# Patient Record
Sex: MALE | Race: BLACK OR AFRICAN AMERICAN | Employment: OTHER | URBAN - METROPOLITAN AREA
[De-identification: names, ages, dates, MRNs, and addresses within clinical notes are randomized per-mention and may not be internally consistent; named-entity substitution may affect disease eponyms.]

---

## 2020-08-05 ENCOUNTER — APPOINTMENT (OUTPATIENT)
Dept: GENERAL RADIOLOGY | Age: 65
End: 2020-08-05
Payer: OTHER GOVERNMENT

## 2020-08-05 ENCOUNTER — HOSPITAL ENCOUNTER (EMERGENCY)
Age: 65
Discharge: HOME | End: 2020-08-05
Payer: OTHER GOVERNMENT

## 2020-08-05 VITALS
WEIGHT: 146 LBS | SYSTOLIC BLOOD PRESSURE: 140 MMHG | DIASTOLIC BLOOD PRESSURE: 107 MMHG | RESPIRATION RATE: 20 BRPM | OXYGEN SATURATION: 95 % | BODY MASS INDEX: 21.62 KG/M2 | HEIGHT: 69 IN | HEART RATE: 95 BPM | TEMPERATURE: 98.1 F

## 2020-08-05 PROCEDURE — 73552 X-RAY EXAM OF FEMUR 2/>: CPT

## 2020-08-05 PROCEDURE — 99283 EMERGENCY DEPT VISIT LOW MDM: CPT

## 2020-08-05 PROCEDURE — 73502 X-RAY EXAM HIP UNI 2-3 VIEWS: CPT

## 2020-08-05 PROCEDURE — 73590 X-RAY EXAM OF LOWER LEG: CPT

## 2020-08-05 PROCEDURE — 6370000000 HC RX 637 (ALT 250 FOR IP): Performed by: NURSE PRACTITIONER

## 2020-08-05 PROCEDURE — 99284 EMERGENCY DEPT VISIT MOD MDM: CPT

## 2020-08-05 RX ORDER — IBUPROFEN 800 MG/1
800 TABLET ORAL EVERY 8 HOURS PRN
Qty: 30 TABLET | Refills: 0 | Status: SHIPPED | OUTPATIENT
Start: 2020-08-05 | End: 2022-07-25

## 2020-08-05 RX ORDER — HYDROCODONE BITARTRATE AND ACETAMINOPHEN 5; 325 MG/1; MG/1
1 TABLET ORAL ONCE
Status: COMPLETED | OUTPATIENT
Start: 2020-08-05 | End: 2020-08-05

## 2020-08-05 RX ADMIN — HYDROCODONE BITARTRATE AND ACETAMINOPHEN 1 TABLET: 5; 325 TABLET ORAL at 17:31

## 2020-08-05 ASSESSMENT — PAIN DESCRIPTION - PAIN TYPE: TYPE: ACUTE PAIN

## 2020-08-05 ASSESSMENT — PAIN DESCRIPTION - ORIENTATION: ORIENTATION: LEFT

## 2020-08-05 ASSESSMENT — PAIN SCALES - GENERAL: PAINLEVEL_OUTOF10: 9

## 2020-08-05 ASSESSMENT — PAIN DESCRIPTION - LOCATION: LOCATION: LEG;HIP
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
## 2020-09-09 ENCOUNTER — APPOINTMENT (OUTPATIENT)
Dept: CT IMAGING | Age: 65
End: 2020-09-09
Payer: OTHER GOVERNMENT

## 2020-09-09 ENCOUNTER — APPOINTMENT (OUTPATIENT)
Dept: GENERAL RADIOLOGY | Age: 65
End: 2020-09-09
Payer: OTHER GOVERNMENT

## 2020-09-09 ENCOUNTER — HOSPITAL ENCOUNTER (EMERGENCY)
Age: 65
Discharge: HOME | End: 2020-09-09
Attending: EMERGENCY MEDICINE
Payer: OTHER GOVERNMENT

## 2020-09-09 VITALS
HEART RATE: 79 BPM | DIASTOLIC BLOOD PRESSURE: 91 MMHG | BODY MASS INDEX: 20.59 KG/M2 | OXYGEN SATURATION: 98 % | TEMPERATURE: 97.5 F | SYSTOLIC BLOOD PRESSURE: 148 MMHG | HEIGHT: 69 IN | WEIGHT: 139 LBS | RESPIRATION RATE: 18 BRPM

## 2020-09-09 LAB
ALBUMIN SERPL-MCNC: 4 G/DL (ref 3.5–5.2)
ALP BLD-CCNC: 69 U/L (ref 40–129)
ALT SERPL-CCNC: 10 U/L (ref 0–40)
ANION GAP SERPL CALCULATED.3IONS-SCNC: 12 MMOL/L (ref 7–16)
APTT: 30 SEC (ref 24.5–35.1)
AST SERPL-CCNC: 12 U/L (ref 0–39)
BACTERIA: NORMAL /HPF
BASOPHILS ABSOLUTE: 0.02 E9/L (ref 0–0.2)
BASOPHILS RELATIVE PERCENT: 0.4 % (ref 0–2)
BILIRUB SERPL-MCNC: 0.4 MG/DL (ref 0–1.2)
BILIRUBIN URINE: NEGATIVE
BLOOD, URINE: NEGATIVE
BUN BLDV-MCNC: 13 MG/DL (ref 8–23)
CALCIUM SERPL-MCNC: 9 MG/DL (ref 8.6–10.2)
CHLORIDE BLD-SCNC: 102 MMOL/L (ref 98–107)
CLARITY: CLEAR
CO2: 24 MMOL/L (ref 22–29)
COLOR: YELLOW
CREAT SERPL-MCNC: 1 MG/DL (ref 0.7–1.2)
EOSINOPHILS ABSOLUTE: 0.12 E9/L (ref 0.05–0.5)
EOSINOPHILS RELATIVE PERCENT: 2.4 % (ref 0–6)
GFR AFRICAN AMERICAN: >60
GFR NON-AFRICAN AMERICAN: >60 ML/MIN/1.73
GLUCOSE BLD-MCNC: 80 MG/DL (ref 74–99)
GLUCOSE URINE: NEGATIVE MG/DL
HCT VFR BLD CALC: 43 % (ref 37–54)
HEMOGLOBIN: 14.1 G/DL (ref 12.5–16.5)
IMMATURE GRANULOCYTES #: 0.02 E9/L
IMMATURE GRANULOCYTES %: 0.4 % (ref 0–5)
INR BLD: 0.9
KETONES, URINE: NEGATIVE MG/DL
LEUKOCYTE ESTERASE, URINE: ABNORMAL
LYMPHOCYTES ABSOLUTE: 1.85 E9/L (ref 1.5–4)
LYMPHOCYTES RELATIVE PERCENT: 36.6 % (ref 20–42)
MAGNESIUM: 2.3 MG/DL (ref 1.6–2.6)
MCH RBC QN AUTO: 27.6 PG (ref 26–35)
MCHC RBC AUTO-ENTMCNC: 32.8 % (ref 32–34.5)
MCV RBC AUTO: 84.1 FL (ref 80–99.9)
MONOCYTES ABSOLUTE: 0.54 E9/L (ref 0.1–0.95)
MONOCYTES RELATIVE PERCENT: 10.7 % (ref 2–12)
NEUTROPHILS ABSOLUTE: 2.5 E9/L (ref 1.8–7.3)
NEUTROPHILS RELATIVE PERCENT: 49.5 % (ref 43–80)
NITRITE, URINE: NEGATIVE
PDW BLD-RTO: 15.5 FL (ref 11.5–15)
PH UA: 6 (ref 5–9)
PLATELET # BLD: 335 E9/L (ref 130–450)
PMV BLD AUTO: 9.1 FL (ref 7–12)
POTASSIUM SERPL-SCNC: 4.2 MMOL/L (ref 3.5–5)
PROTEIN UA: NEGATIVE MG/DL
PROTHROMBIN TIME: 10.2 SEC (ref 9.3–12.4)
RBC # BLD: 5.11 E12/L (ref 3.8–5.8)
RBC UA: NORMAL /HPF (ref 0–2)
SODIUM BLD-SCNC: 138 MMOL/L (ref 132–146)
SPECIFIC GRAVITY UA: 1.01 (ref 1–1.03)
TOTAL PROTEIN: 6.7 G/DL (ref 6.4–8.3)
TROPONIN: <0.01 NG/ML (ref 0–0.03)
UROBILINOGEN, URINE: 0.2 E.U./DL
WBC # BLD: 5.1 E9/L (ref 4.5–11.5)
WBC UA: NORMAL /HPF (ref 0–5)

## 2020-09-09 PROCEDURE — 85610 PROTHROMBIN TIME: CPT

## 2020-09-09 PROCEDURE — 99283 EMERGENCY DEPT VISIT LOW MDM: CPT

## 2020-09-09 PROCEDURE — 83735 ASSAY OF MAGNESIUM: CPT

## 2020-09-09 PROCEDURE — 70450 CT HEAD/BRAIN W/O DYE: CPT

## 2020-09-09 PROCEDURE — 71045 X-RAY EXAM CHEST 1 VIEW: CPT

## 2020-09-09 PROCEDURE — 6360000002 HC RX W HCPCS: Performed by: EMERGENCY MEDICINE

## 2020-09-09 PROCEDURE — 85730 THROMBOPLASTIN TIME PARTIAL: CPT

## 2020-09-09 PROCEDURE — 85025 COMPLETE CBC W/AUTO DIFF WBC: CPT

## 2020-09-09 PROCEDURE — 84484 ASSAY OF TROPONIN QUANT: CPT

## 2020-09-09 PROCEDURE — 81001 URINALYSIS AUTO W/SCOPE: CPT

## 2020-09-09 PROCEDURE — 72125 CT NECK SPINE W/O DYE: CPT

## 2020-09-09 PROCEDURE — 36415 COLL VENOUS BLD VENIPUNCTURE: CPT

## 2020-09-09 PROCEDURE — 80053 COMPREHEN METABOLIC PANEL: CPT

## 2020-09-09 PROCEDURE — 99284 EMERGENCY DEPT VISIT MOD MDM: CPT

## 2020-09-09 PROCEDURE — 96374 THER/PROPH/DIAG INJ IV PUSH: CPT

## 2020-09-09 PROCEDURE — 93005 ELECTROCARDIOGRAM TRACING: CPT | Performed by: EMERGENCY MEDICINE

## 2020-09-09 RX ORDER — DEXAMETHASONE SODIUM PHOSPHATE 10 MG/ML
10 INJECTION, SOLUTION INTRAMUSCULAR; INTRAVENOUS ONCE
Status: COMPLETED | OUTPATIENT
Start: 2020-09-09 | End: 2020-09-09

## 2020-09-09 RX ORDER — NAPROXEN 500 MG/1
500 TABLET ORAL 2 TIMES DAILY PRN
Qty: 30 TABLET | Refills: 0 | Status: SHIPPED | OUTPATIENT
Start: 2020-09-09 | End: 2022-05-10

## 2020-09-09 RX ADMIN — DEXAMETHASONE SODIUM PHOSPHATE 10 MG: 10 INJECTION, SOLUTION INTRAMUSCULAR; INTRAVENOUS at 15:55

## 2020-09-09 NOTE — ED PROVIDER NOTES
HPI:  9/9/20,   Time: 10:20 AM EDT       Wilber Koyanagi is a 76 y.o. male presenting to the ED for fall/vanessa arm numbness, beginning 4 day ago. The complaint has been persistent, moderate in severity, and worsened by nothing. [facility], [facility] for concern for cva. Fell 4 days ago, persistant ha, no thinners. No weakness, no visual changes, no hx same. No incontinence, no saddle anesthesia, no fever    Review of Systems:   Pertinent positives and negatives are stated within HPI, all other systems reviewed and are negative.          --------------------------------------------- PAST HISTORY ---------------------------------------------  Past Medical History:  has a past medical history of Cancer ([facility] [address] Past Surgical History:  has a past surgical history that includes Skin graft and Hemorrhoid surgery. Social History:  reports that he has been smoking. He has never used smokeless tobacco. He reports that he does not drink alcohol or use drugs. Family History: family history is not on file. The patients home medications have been reviewed. Allergies: Patient has no known allergies. ---------------------------------------------------PHYSICAL EXAM--------------------------------------    Constitutional/General: Alert and oriented x3, well appearing, non toxic in NAD  Head: Normocephalic and atraumatic  Eyes: PERRL, EOMI, conjunctive normal, sclera non icteric  Mouth: Oropharynx clear, handling secretions, no trismus, no asymmetry of the posterior oropharynx or uvular edema  Neck: Supple, full ROM, non tender to palpation in the midline, no stridor, no crepitus, no meningeal signs  Respiratory: Lungs clear to auscultation bilaterally, no wheezes, rales, or rhonchi. Not in respiratory distress  Cardiovascular:  Regular rate. Regular rhythm. No murmurs, gallops, or rubs. 2+ distal pulses  Chest: No chest wall tenderness  GI:  Abdomen Soft, Non tender, Non distended. +BS.  No organomegaly, no palpable masses,  No rebound, guarding, or rigidity. Musculoskeletal: Moves all extremities x 4. Warm and well perfused, no clubbing, cyanosis, or edema. Capillary refill <3 seconds  Integument: skin warm and dry. No rashes. Lymphatic: no lymphadenopathy noted  Neurologic: GCS 15, no focal deficits, symmetric strength 5/5 in the upper and lower extremities bilaterally  Psychiatric: Normal Affect    -------------------------------------------------- RESULTS -------------------------------------------------  I have personally reviewed all laboratory and imaging results for this patient. Results are listed below.      LABS:  Results for orders placed or performed during the hospital encounter of 09/09/20   CBC Auto Differential   Result Value Ref Range    WBC 5.1 4.5 - 11.5 E9/L    RBC 5.11 3.80 - 5.80 E12/L    Hemoglobin 14.1 12.5 - 16.5 g/dL    Hematocrit 43.0 37.0 - 54.0 %    MCV 84.1 80.0 - 99.9 fL    MCH 27.6 26.0 - 35.0 pg    MCHC 32.8 32.0 - 34.5 %    RDW 15.5 (H) 11.5 - 15.0 fL    Platelets 679 114 - 950 E9/L    MPV 9.1 7.0 - 12.0 fL    Neutrophils % 49.5 43.0 - 80.0 %    Immature Granulocytes % 0.4 0.0 - 5.0 %    Lymphocytes % 36.6 20.0 - 42.0 %    Monocytes % 10.7 2.0 - 12.0 %    Eosinophils % 2.4 0.0 - 6.0 %    Basophils % 0.4 0.0 - 2.0 %    Neutrophils Absolute 2.50 1.80 - 7.30 E9/L    Immature Granulocytes # 0.02 E9/L    Lymphocytes Absolute 1.85 1.50 - 4.00 E9/L    Monocytes Absolute 0.54 0.10 - 0.95 E9/L    Eosinophils Absolute 0.12 0.05 - 0.50 E9/L    Basophils Absolute 0.02 0.00 - 0.20 E9/L   Troponin   Result Value Ref Range    Troponin <0.01 0.00 - 0.03 ng/mL   Protime-INR   Result Value Ref Range    Protime 10.2 9.3 - 12.4 sec    INR 0.9    APTT   Result Value Ref Range    aPTT 30.0 24.5 - 35.1 sec   Magnesium   Result Value Ref Range    Magnesium 2.3 1.6 - 2.6 mg/dL   COMPREHENSIVE METABOLIC PANEL   Result Value Ref Range    Sodium 138 132 - 146 mmol/L    Potassium 4.2 3.5 - 5.0 mmol/L    Chloride 102 98 - 107 mmol/L    CO2 24 22 - 29 mmol/L    Anion Gap 12 7 - 16 mmol/L    Glucose 80 74 - 99 mg/dL    BUN 13 8 - 23 mg/dL    CREATININE 1.0 0.7 - 1.2 mg/dL    GFR Non-African American >60 >=60 mL/min/1.73    GFR African American >60     Calcium 9.0 8.6 - 10.2 mg/dL    Total Protein 6.7 6.4 - 8.3 g/dL    Alb 4.0 3.5 - 5.2 g/dL    Total Bilirubin 0.4 0.0 - 1.2 mg/dL    Alkaline Phosphatase 69 40 - 129 U/L    ALT 10 0 - 40 U/L    AST 12 0 - 39 U/L   Urinalysis, reflex to microscopic   Result Value Ref Range    Color, UA Yellow Straw/Yellow    Clarity, UA Clear Clear    Glucose, Ur Negative Negative mg/dL    Bilirubin Urine Negative Negative    Ketones, Urine Negative Negative mg/dL    Specific Gravity, UA 1.015 1.005 - 1.030    Blood, Urine Negative Negative    pH, UA 6.0 5.0 - 9.0    Protein, UA Negative Negative mg/dL    Urobilinogen, Urine 0.2 <2.0 E.U./dL    Nitrite, Urine Negative Negative    Leukocyte Esterase, Urine TRACE (A) Negative   Microscopic Urinalysis   Result Value Ref Range    WBC, UA NONE 0 - 5 /HPF    RBC, UA NONE 0 - 2 /HPF    Bacteria, UA NONE SEEN None Seen /HPF   EKG 12 Lead   Result Value Ref Range    Ventricular Rate 56 BPM    Atrial Rate 56 BPM    P-R Interval 140 ms    QRS Duration 88 ms    Q-T Interval 432 ms    QTc Calculation (Bazett) 416 ms    P Axis 60 degrees    R Axis 94 degrees    T Axis 8 degrees       RADIOLOGY:  Interpreted by Radiologist.  CT Head WO Contrast   Final Result      1. CT brain: Extensive white matter density changes suggesting chronic   microvascular ischemic disease. Likely old lacune or infarct on the   right. 2. CT cervical spine: No acute fracture or dislocation. Degenerative   spondylotic changes. C4-5: Large broad-based disc protrusion crossing the midline   asymmetrically extending to the left associated with very severe canal   encroachment. C3-4: Diffuse disc bulging with severe canal encroachment.    Moderate canal encroachment at C5-C6 and C7-T1. See above. CT Cervical Spine WO Contrast   Final Result      1. CT brain: Extensive white matter density changes suggesting chronic   microvascular ischemic disease. Likely old lacune or infarct on the   right. 2. CT cervical spine: No acute fracture or dislocation. Degenerative   spondylotic changes. C4-5: Large broad-based disc protrusion crossing the midline   asymmetrically extending to the left associated with very severe canal   encroachment. C3-4: Diffuse disc bulging with severe canal encroachment. Moderate canal encroachment at C5-C6 and C7-T1. See above. XR CHEST PORTABLE   Final Result   tortuous ectatic aorta   Airspace disease compatible with atelectasis, at the left lung base                         EKG:  This EKG is signed and interpreted by the EP. Time: 1049  Rate: 55  Rhythm: Sinus  Interpretation: sinus bradycardia  Comparison: None      ------------------------- NURSING NOTES AND VITALS REVIEWED ---------------------------   The nursing notes within the ED encounter and vital signs as below have been reviewed by myself. BP (!) 148/91   Pulse 79   Temp 97.5 °F (36.4 °C) (Oral)   Resp 18   Ht 5' 9\" (1.753 m)   Wt 139 lb (63 kg)   SpO2 98%   BMI 20.53 kg/m²   Oxygen Saturation Interpretation: Normal    The patients available past medical records and past encounters were reviewed. ------------------------------ ED COURSE/MEDICAL DECISION MAKING----------------------  Medications   dexamethasone (PF) (DECADRON) injection 10 mg (10 mg Intravenous Given 9/9/20 1555)         ED COURSE:       Medical Decision Making:    Pt neuro intact, results noted, stenosis on ct, that is cause of vanessa paresthesias likely. Sx vanessa, so not cva, feel can dc with pcp fu.   Pt agreeable with poc      This patient's ED course included: a personal history and physicial examination    This patient has remained hemodynamically stable during their ED

## 2020-09-10 LAB
EKG ATRIAL RATE: 56 BPM
EKG P AXIS: 60 DEGREES
EKG P-R INTERVAL: 140 MS
EKG Q-T INTERVAL: 432 MS
EKG QRS DURATION: 88 MS
EKG QTC CALCULATION (BAZETT): 416 MS
EKG R AXIS: 94 DEGREES
EKG T AXIS: 8 DEGREES
EKG VENTRICULAR RATE: 56 BPM

## 2020-09-10 PROCEDURE — 93010 ELECTROCARDIOGRAM REPORT: CPT | Performed by: INTERNAL MEDICINE

## 2021-11-10 LAB
ALBUMIN SERPL-MCNC: 4.1 G/DL (ref 3.5–5.2)
ALP BLD-CCNC: 71 U/L (ref 40–129)
ALT SERPL-CCNC: 10 U/L (ref 0–40)
ANION GAP SERPL CALCULATED.3IONS-SCNC: 11 MMOL/L (ref 7–16)
AST SERPL-CCNC: 14 U/L (ref 0–39)
BACTERIA: NORMAL /HPF
BASOPHILS ABSOLUTE: 0.02 E9/L (ref 0–0.2)
BASOPHILS RELATIVE PERCENT: 0.4 % (ref 0–2)
BILIRUB SERPL-MCNC: 0.6 MG/DL (ref 0–1.2)
BILIRUBIN URINE: NEGATIVE
BLOOD, URINE: NEGATIVE
BUN BLDV-MCNC: 21 MG/DL (ref 6–23)
CALCIUM SERPL-MCNC: 9.3 MG/DL (ref 8.6–10.2)
CHLORIDE BLD-SCNC: 106 MMOL/L (ref 98–107)
CLARITY: CLEAR
CO2: 22 MMOL/L (ref 22–29)
COLOR: YELLOW
CREAT SERPL-MCNC: 1.1 MG/DL (ref 0.7–1.2)
EOSINOPHILS ABSOLUTE: 0.1 E9/L (ref 0.05–0.5)
EOSINOPHILS RELATIVE PERCENT: 2 % (ref 0–6)
GFR AFRICAN AMERICAN: >60
GFR NON-AFRICAN AMERICAN: >60 ML/MIN/1.73
GLUCOSE BLD-MCNC: 94 MG/DL (ref 74–99)
GLUCOSE URINE: NEGATIVE MG/DL
HCT VFR BLD CALC: 42.5 % (ref 37–54)
HEMOGLOBIN: 14.1 G/DL (ref 12.5–16.5)
IMMATURE GRANULOCYTES #: 0.02 E9/L
IMMATURE GRANULOCYTES %: 0.4 % (ref 0–5)
KETONES, URINE: NEGATIVE MG/DL
LACTIC ACID, SEPSIS: 0.7 MMOL/L (ref 0.5–1.9)
LEUKOCYTE ESTERASE, URINE: NEGATIVE
LYMPHOCYTES ABSOLUTE: 1.48 E9/L (ref 1.5–4)
LYMPHOCYTES RELATIVE PERCENT: 29.6 % (ref 20–42)
MCH RBC QN AUTO: 27.5 PG (ref 26–35)
MCHC RBC AUTO-ENTMCNC: 33.2 % (ref 32–34.5)
MCV RBC AUTO: 83 FL (ref 80–99.9)
MONOCYTES ABSOLUTE: 0.49 E9/L (ref 0.1–0.95)
MONOCYTES RELATIVE PERCENT: 9.8 % (ref 2–12)
NEUTROPHILS ABSOLUTE: 2.89 E9/L (ref 1.8–7.3)
NEUTROPHILS RELATIVE PERCENT: 57.8 % (ref 43–80)
NITRITE, URINE: NEGATIVE
PDW BLD-RTO: 16 FL (ref 11.5–15)
PH UA: 7 (ref 5–9)
PLATELET # BLD: 297 E9/L (ref 130–450)
PMV BLD AUTO: 9.1 FL (ref 7–12)
POTASSIUM SERPL-SCNC: 4.6 MMOL/L (ref 3.5–5)
PROTEIN UA: NEGATIVE MG/DL
RBC # BLD: 5.12 E12/L (ref 3.8–5.8)
RBC UA: NORMAL /HPF (ref 0–2)
SODIUM BLD-SCNC: 139 MMOL/L (ref 132–146)
SPECIFIC GRAVITY UA: 1.02 (ref 1–1.03)
TOTAL PROTEIN: 6.7 G/DL (ref 6.4–8.3)
UROBILINOGEN, URINE: 1 E.U./DL
WBC # BLD: 5 E9/L (ref 4.5–11.5)
WBC UA: NORMAL /HPF (ref 0–5)

## 2021-11-10 PROCEDURE — 85025 COMPLETE CBC W/AUTO DIFF WBC: CPT

## 2021-11-10 PROCEDURE — 99283 EMERGENCY DEPT VISIT LOW MDM: CPT

## 2021-11-10 PROCEDURE — 83605 ASSAY OF LACTIC ACID: CPT

## 2021-11-10 PROCEDURE — 84484 ASSAY OF TROPONIN QUANT: CPT

## 2021-11-10 PROCEDURE — 83880 ASSAY OF NATRIURETIC PEPTIDE: CPT

## 2021-11-10 PROCEDURE — 81001 URINALYSIS AUTO W/SCOPE: CPT

## 2021-11-10 PROCEDURE — 36415 COLL VENOUS BLD VENIPUNCTURE: CPT

## 2021-11-10 PROCEDURE — 80053 COMPREHEN METABOLIC PANEL: CPT

## 2021-11-10 ASSESSMENT — PAIN SCALES - GENERAL: PAINLEVEL_OUTOF10: 7

## 2021-11-10 NOTE — ED TRIAGE NOTES
FIRST PROVIDER CONTACT ASSESSMENT NOTE           Department of Emergency Medicine                 First Provider Note            11/10/21  6:09 PM EST    Date of Encounter: No admission date for patient encounter. Patient Name: Jenn Avendaño  : 1955  MRN: [id number]    Chief Complaint: Abdominal Pain (feels like something swollen in lower middle abdomen. \"feels like an alien inside of him\"  making it hard to walk)      History of Present Illness:   Jenn Avendaño is a 78 y.o. male who presents to the ED for llq pain , feels swollen    Focused Physical Exam:  VS:    ED Triage Vitals   BP Temp Temp Source Pulse Resp SpO2 Height Weight   11/10/21 1411 11/10/21 1401 11/10/21 1401 11/10/21 1401 -- 11/10/21 1401 -- 11/10/21 1411   (!) 145/111 98.7 °F (37.1 °C) Oral 83  95 %  130 lb (59 kg)        Physical Ex: Constitutional: Alert and non-toxic. Medical History:  has a past medical history of Cancer ([place] [address] Surgical History:  has a past surgical history that includes Skin graft and Hemorrhoid surgery. Social History:  reports that he has been smoking. He has never used smokeless tobacco. He reports that he does not drink alcohol and does not use drugs. Family History: family history is not on file. Allergies: Patient has no known allergies.      Initial Plan of Care: Initiate Treatment-Testing, Proceed toTreatment Area When Bed Available for ED Attending/MLP to Continue Care      ---END OF FIRST PROVIDER CONTACT ASSESSMENT NOTE---  Electronically signed by REBECCA Yu CNP   DD: 11/10/21

## 2021-11-11 ENCOUNTER — HOSPITAL ENCOUNTER (EMERGENCY)
Age: 66
Discharge: HOME | End: 2021-11-11
Attending: EMERGENCY MEDICINE

## 2021-11-11 ENCOUNTER — APPOINTMENT (OUTPATIENT)
Dept: GENERAL RADIOLOGY | Age: 66
End: 2021-11-11

## 2021-11-11 ENCOUNTER — APPOINTMENT (OUTPATIENT)
Dept: CT IMAGING | Age: 66
End: 2021-11-11

## 2021-11-11 VITALS
TEMPERATURE: 98.1 F | RESPIRATION RATE: 20 BRPM | HEART RATE: 75 BPM | SYSTOLIC BLOOD PRESSURE: 138 MMHG | BODY MASS INDEX: 19.2 KG/M2 | DIASTOLIC BLOOD PRESSURE: 80 MMHG | WEIGHT: 130 LBS | OXYGEN SATURATION: 98 %

## 2021-11-11 DIAGNOSIS — K59.00 CONSTIPATION, UNSPECIFIED CONSTIPATION TYPE: ICD-10-CM

## 2021-11-11 DIAGNOSIS — N40.0 ENLARGED PROSTATE: ICD-10-CM

## 2021-11-11 DIAGNOSIS — R10.9 ABDOMINAL PAIN, UNSPECIFIED ABDOMINAL LOCATION: Primary | ICD-10-CM

## 2021-11-11 LAB
EKG ATRIAL RATE: 61 BPM
EKG P AXIS: 63 DEGREES
EKG P-R INTERVAL: 142 MS
EKG Q-T INTERVAL: 432 MS
EKG QRS DURATION: 76 MS
EKG QTC CALCULATION (BAZETT): 434 MS
EKG R AXIS: 69 DEGREES
EKG T AXIS: 54 DEGREES
EKG VENTRICULAR RATE: 61 BPM
PRO-BNP: 118 PG/ML (ref 0–125)
TROPONIN, HIGH SENSITIVITY: 10 NG/L (ref 0–11)

## 2021-11-11 PROCEDURE — 74177 CT ABD & PELVIS W/CONTRAST: CPT

## 2021-11-11 PROCEDURE — 71045 X-RAY EXAM CHEST 1 VIEW: CPT

## 2021-11-11 PROCEDURE — 6360000004 HC RX CONTRAST MEDICATION: Performed by: RADIOLOGY

## 2021-11-11 PROCEDURE — 2580000003 HC RX 258: Performed by: EMERGENCY MEDICINE

## 2021-11-11 PROCEDURE — 93010 ELECTROCARDIOGRAM REPORT: CPT | Performed by: INTERNAL MEDICINE

## 2021-11-11 PROCEDURE — 93005 ELECTROCARDIOGRAM TRACING: CPT | Performed by: EMERGENCY MEDICINE

## 2021-11-11 PROCEDURE — 6370000000 HC RX 637 (ALT 250 FOR IP): Performed by: EMERGENCY MEDICINE

## 2021-11-11 RX ORDER — SODIUM CHLORIDE 9 MG/ML
INJECTION, SOLUTION INTRAVENOUS CONTINUOUS
Status: DISCONTINUED | OUTPATIENT
Start: 2021-11-11 | End: 2021-11-11 | Stop reason: HOSPADM

## 2021-11-11 RX ORDER — DOCUSATE SODIUM 100 MG/1
100 CAPSULE, LIQUID FILLED ORAL 2 TIMES DAILY PRN
Qty: 10 CAPSULE | Refills: 0 | Status: SHIPPED | OUTPATIENT
Start: 2021-11-11 | End: 2021-11-16

## 2021-11-11 RX ADMIN — MAGNESIUM CITRATE 296 ML: 1.75 LIQUID ORAL at 05:30

## 2021-11-11 RX ADMIN — IOPAMIDOL 90 ML: 755 INJECTION, SOLUTION INTRAVENOUS at 02:45

## 2021-11-11 RX ADMIN — SODIUM CHLORIDE: 9 INJECTION, SOLUTION INTRAVENOUS at 00:00

## 2021-11-11 NOTE — ED PROVIDER NOTES
HPI:  11/11/21, Time: 12:26 AM KATHLEEN Dunham is a 78 y.o. male presenting to the ED for abdominal pain, beginning months ago. The complaint has been persistent, moderate in severity, and worsened by nothing. Patient reporting that abdomen has been feeling swollen for last several months. Patient reporting no chest pain he does report some mild shortness of breath with exertion. Patient reporting no black or tarry stools. Patient reporting no fever chills or productive cough. Patient reporting feelings swelling in his lower abdomen. Patient does report difficulty urinating at times. Patient reporting no black or tarry stools. He reports no headache. ROS:   Pertinent positives and negatives are stated within HPI, all other systems reviewed and are negative.  --------------------------------------------- PAST HISTORY ---------------------------------------------  Past Medical History:  has a past medical history of Cancer ([facility] [address] Past Surgical History:  has a past surgical history that includes Skin graft and Hemorrhoid surgery. Social History:  reports that he has been smoking. He has never used smokeless tobacco. He reports that he does not drink alcohol and does not use drugs. Family History: family history is not on file. The patients home medications have been reviewed. Allergies: Patient has no known allergies. ---------------------------------------------------PHYSICAL EXAM--------------------------------------    Constitutional/General: Alert and oriented x3, well appearing, non toxic in NAD  Head: Normocephalic and atraumatic  Eyes: PERRL, EOMI  Mouth: Oropharynx clear, handling secretions, no trismus  Neck: Supple, full ROM, non tender to palpation in the midline, no stridor, no crepitus, no meningeal signs  Pulmonary: Lungs clear to auscultation bilaterally, no wheezes, rales, or rhonchi. Not in respiratory distress  Cardiovascular:  Regular rate.  Regular rhythm. No murmurs, gallops, or rubs. 2+ distal pulses  Chest: no chest wall tenderness  Abdomen: Soft. Tender left lower abdomen and suprapubic. Non distended. +BS. No rebound, guarding, or rigidity. No pulsatile masses appreciated. Musculoskeletal: Moves all extremities x 4. Warm and well perfused, no clubbing, cyanosis, or edema. Capillary refill <3 seconds  Skin: warm and dry. No rashes. Neurologic: GCS 15, CN 2-12 grossly intact, no focal deficits, symmetric strength 5/5 in the upper and lower extremities bilaterally  Psych: Normal Affect    -------------------------------------------------- RESULTS -------------------------------------------------  I have personally reviewed all laboratory and imaging results for this patient. Results are listed below.      LABS:  Results for orders placed or performed during the hospital encounter of 11/11/21   CBC auto differential   Result Value Ref Range    WBC 5.0 4.5 - 11.5 E9/L    RBC 5.12 3.80 - 5.80 E12/L    Hemoglobin 14.1 12.5 - 16.5 g/dL    Hematocrit 42.5 37.0 - 54.0 %    MCV 83.0 80.0 - 99.9 fL    MCH 27.5 26.0 - 35.0 pg    MCHC 33.2 32.0 - 34.5 %    RDW 16.0 (H) 11.5 - 15.0 fL    Platelets 873 746 - 713 E9/L    MPV 9.1 7.0 - 12.0 fL    Neutrophils % 57.8 43.0 - 80.0 %    Immature Granulocytes % 0.4 0.0 - 5.0 %    Lymphocytes % 29.6 20.0 - 42.0 %    Monocytes % 9.8 2.0 - 12.0 %    Eosinophils % 2.0 0.0 - 6.0 %    Basophils % 0.4 0.0 - 2.0 %    Neutrophils Absolute 2.89 1.80 - 7.30 E9/L    Immature Granulocytes # 0.02 E9/L    Lymphocytes Absolute 1.48 (L) 1.50 - 4.00 E9/L    Monocytes Absolute 0.49 0.10 - 0.95 E9/L    Eosinophils Absolute 0.10 0.05 - 0.50 E9/L    Basophils Absolute 0.02 0.00 - 0.20 E9/L   Comprehensive Metabolic Panel   Result Value Ref Range    Sodium 139 132 - 146 mmol/L    Potassium 4.6 3.5 - 5.0 mmol/L    Chloride 106 98 - 107 mmol/L    CO2 22 22 - 29 mmol/L    Anion Gap 11 7 - 16 mmol/L    Glucose 94 74 - 99 mg/dL    BUN 21 6 - 23 mg/dL CREATININE 1.1 0.7 - 1.2 mg/dL    GFR Non-African American >60 >=60 mL/min/1.73    GFR African American >60     Calcium 9.3 8.6 - 10.2 mg/dL    Total Protein 6.7 6.4 - 8.3 g/dL    Albumin 4.1 3.5 - 5.2 g/dL    Total Bilirubin 0.6 0.0 - 1.2 mg/dL    Alkaline Phosphatase 71 40 - 129 U/L    ALT 10 0 - 40 U/L    AST 14 0 - 39 U/L   Lactate, Sepsis   Result Value Ref Range    Lactic Acid, Sepsis 0.7 0.5 - 1.9 mmol/L   Urinalysis with Microscopic   Result Value Ref Range    Color, UA Yellow Straw/Yellow    Clarity, UA Clear Clear    Glucose, Ur Negative Negative mg/dL    Bilirubin Urine Negative Negative    Ketones, Urine Negative Negative mg/dL    Specific Gravity, UA 1.020 1.005 - 1.030    Blood, Urine Negative Negative    pH, UA 7.0 5.0 - 9.0    Protein, UA Negative Negative mg/dL    Urobilinogen, Urine 1.0 <2.0 E.U./dL    Nitrite, Urine Negative Negative    Leukocyte Esterase, Urine Negative Negative    WBC, UA NONE 0 - 5 /HPF    RBC, UA NONE 0 - 2 /HPF    Bacteria, UA NONE SEEN None Seen /HPF   Troponin   Result Value Ref Range    Troponin, High Sensitivity 10 0 - 11 ng/L   Brain Natriuretic Peptide   Result Value Ref Range    Pro- 0 - 125 pg/mL       RADIOLOGY:  Interpreted by Radiologist.  CT ABDOMEN PELVIS W IV CONTRAST Additional Contrast? None   Final Result   Moderate stool burden. Mild fluid distention of a few small bowel loops which may be incidental or   due to mild enteritis. Prostatomegaly. Evaluation is somewhat limited by lack of intra-abdominal fat and patient   motion but allowing for this, no other acute process identified. Short-term   follow-up recommended if symptoms persist.         XR CHEST PORTABLE   Final Result   Chronic appearing findings. PA and lateral views would be useful for further   assessment, if symptoms persist.               EKG:  This EKG is signed and interpreted by me.     Rate: 61  Rhythm: Sinus  Interpretation: non-specific EKG  Comparison: stable as patient and discussed todays results, in addition to providing specific details for the plan of care and counseling regarding the diagnosis and prognosis. Questions are answered at this time and they are agreeable with the plan.       --------------------------------- IMPRESSION AND DISPOSITION ---------------------------------    IMPRESSION  1. Abdominal pain, unspecified abdominal location    2. Enlarged prostate    3. Constipation, unspecified constipation type        DISPOSITION  Disposition: Discharge home  Patient condition is stable        NOTE: This report was transcribed using voice recognition software.  Every effort was made to ensure accuracy; however, inadvertent computerized transcription errors may be present          Geovani Rod MD  11/11/21 2067       Geovani Rod MD  11/11/21 5780

## 2022-03-24 ENCOUNTER — OFFICE VISIT (OUTPATIENT)
Dept: NEUROLOGY | Age: 67
End: 2022-03-24
Payer: OTHER GOVERNMENT

## 2022-03-24 VITALS
TEMPERATURE: 98.1 F | DIASTOLIC BLOOD PRESSURE: 98 MMHG | HEIGHT: 69 IN | SYSTOLIC BLOOD PRESSURE: 158 MMHG | OXYGEN SATURATION: 96 % | WEIGHT: 136.5 LBS | BODY MASS INDEX: 20.22 KG/M2 | RESPIRATION RATE: 16 BRPM | HEART RATE: 82 BPM

## 2022-03-24 DIAGNOSIS — G95.20 SPINAL CORD COMPRESSION (HCC): Primary | ICD-10-CM

## 2022-03-24 PROCEDURE — 99205 OFFICE O/P NEW HI 60 MIN: CPT | Performed by: CLINICAL NURSE SPECIALIST
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
## 2022-03-29 ENCOUNTER — OFFICE VISIT (OUTPATIENT)
Dept: NEUROSURGERY | Age: 67
End: 2022-03-29
Payer: OTHER GOVERNMENT

## 2022-03-29 VITALS
SYSTOLIC BLOOD PRESSURE: 148 MMHG | TEMPERATURE: 98.3 F | DIASTOLIC BLOOD PRESSURE: 108 MMHG | OXYGEN SATURATION: 98 % | BODY MASS INDEX: 20.14 KG/M2 | WEIGHT: 136 LBS | HEIGHT: 69 IN | HEART RATE: 107 BPM | RESPIRATION RATE: 20 BRPM

## 2022-03-29 DIAGNOSIS — M47.12 OSTEOARTHRITIS OF CERVICAL SPINE WITH MYELOPATHY: Primary | ICD-10-CM

## 2022-03-29 PROCEDURE — 99203 OFFICE O/P NEW LOW 30 MIN: CPT | Performed by: PHYSICIAN ASSISTANT

## 2022-03-29 RX ORDER — TAMSULOSIN HYDROCHLORIDE 0.4 MG/1
CAPSULE ORAL
COMMUNITY
Start: 2021-09-30

## 2022-03-29 RX ORDER — PSEUDOEPHEDRINE HCL 30 MG
TABLET ORAL
COMMUNITY
Start: 2021-11-17

## 2022-03-29 RX ORDER — SILDENAFIL 100 MG/1
TABLET, FILM COATED ORAL
COMMUNITY
Start: 2021-06-25

## 2022-03-29 ASSESSMENT — ENCOUNTER SYMPTOMS
RESPIRATORY NEGATIVE: 1
ALLERGIC/IMMUNOLOGIC NEGATIVE: 1
GASTROINTESTINAL NEGATIVE: 1
EYES NEGATIVE: 1

## 2022-03-29 NOTE — PROGRESS NOTES
Patellar reflexes are 3+ on the right side and 3+ on the left side. Comments: +HOFFMANS    Decreased sensation to LT in both hands in glove dist    Bilateral hand intrinsics 2/5  Left tricep and deltoid 2/5   Psychiatric:         Mood and Affect: Mood normal.         Assessment:      78year old male with progressive neck pain, arm weakness/numbness, gait dysfunction consistent with myelopathy. Plan:      Cervical MRI ordered, await results.         RUBEN Nagy

## 2022-04-20 ENCOUNTER — HOSPITAL ENCOUNTER (OUTPATIENT)
Dept: MRI IMAGING | Age: 67
Discharge: HOME | End: 2022-04-22
Payer: OTHER GOVERNMENT

## 2022-04-20 DIAGNOSIS — G95.20 SPINAL CORD COMPRESSION (HCC): ICD-10-CM

## 2022-04-20 PROCEDURE — 72141 MRI NECK SPINE W/O DYE: CPT

## 2022-04-27 ENCOUNTER — TELEPHONE (OUTPATIENT)
Dept: NEUROSURGERY | Age: 67
End: 2022-04-27

## 2022-05-04 ENCOUNTER — OFFICE VISIT (OUTPATIENT)
Dept: NEUROSURGERY | Age: 67
End: 2022-05-04
Payer: OTHER GOVERNMENT

## 2022-05-04 VITALS
OXYGEN SATURATION: 99 % | BODY MASS INDEX: 19.26 KG/M2 | HEIGHT: 69 IN | TEMPERATURE: 98 F | SYSTOLIC BLOOD PRESSURE: 138 MMHG | WEIGHT: 130 LBS | HEART RATE: 100 BPM | RESPIRATION RATE: 18 BRPM | DIASTOLIC BLOOD PRESSURE: 88 MMHG

## 2022-05-04 DIAGNOSIS — M54.2 NECK PAIN: Primary | ICD-10-CM

## 2022-05-04 PROCEDURE — 99215 OFFICE O/P EST HI 40 MIN: CPT | Performed by: NEUROLOGICAL SURGERY

## 2022-05-04 ASSESSMENT — ENCOUNTER SYMPTOMS
EYES NEGATIVE: 1
RESPIRATORY NEGATIVE: 1
GASTROINTESTINAL NEGATIVE: 1
ALLERGIC/IMMUNOLOGIC NEGATIVE: 1

## 2022-05-04 NOTE — PROGRESS NOTES
Priscila Lucas (:  1955) is a 78 y.o. male,Established patient, here for evaluation of the following chief complaint(s):  Follow-up (review MRI - Pt states he is having bladder incontinence. )         ASSESSMENT/PLAN:  1. Neck pain  78year old male who presents with neck pain and myelopathy with hyperreflexia, gait instability and loss of dexterity. His MRI shows large herniated disk at C3-C4, C4-C5 and C5-C6 with stenosis from C3-C7. Due to his myelopathy, I am recommending a 2 staged procedure consisting of anterior cervical diskectomy and fusdion at C3-C4, C4-C5 and C5-C6 with posterior C3-C7 laminectomy and C3-T1 fusion    No follow-ups on file. Subjective   SUBJECTIVE/OBJECTIVE:  HPI  78year old male who presents with neck pain. Gait instability, loss of dexterity and right arm weakness. This has been going on for over 1 year but has gotten progressively worse over the last 3 months. He also complains of numbness, tingling and weakness in his legs bilaterally. He has tried NSAIDs and physical therapy. The neck pain is described as sharp and achy. It is rated as a 7/10    Review of Systems   Constitutional: Negative. HENT: Negative. Eyes: Negative. Respiratory: Negative. Cardiovascular: Negative. Gastrointestinal: Negative. Endocrine: Negative. Genitourinary: Negative. Musculoskeletal: Positive for arthralgias, neck pain and neck stiffness. Skin: Negative. Allergic/Immunologic: Negative. Neurological: Positive for weakness and numbness. Hematological: Negative. Psychiatric/Behavioral: Negative. Objective   Physical Exam  Vitals reviewed. Constitutional:       General: He is not in acute distress. Appearance: Normal appearance. He is normal weight. He is not ill-appearing, toxic-appearing or diaphoretic. HENT:      Head: Normocephalic and atraumatic. Nose: Nose normal.   Eyes:      General: No scleral icterus.         Right eye: No discharge. Left eye: No discharge. Extraocular Movements: Extraocular movements intact. Conjunctiva/sclera: Conjunctivae normal.      Pupils: Pupils are equal, round, and reactive to light. Abdominal:      General: Abdomen is flat. Musculoskeletal:         General: No swelling, tenderness, deformity or signs of injury. Normal range of motion. Right lower leg: No edema. Left lower leg: No edema. Skin:     General: Skin is warm and dry. Capillary Refill: Capillary refill takes less than 2 seconds. Coloration: Skin is not jaundiced or pale. Findings: No bruising, erythema, lesion or rash. Neurological:      Mental Status: He is alert and oriented to person, place, and time. Mental status is at baseline. GCS: GCS eye subscore is 4. GCS verbal subscore is 5. GCS motor subscore is 6. Cranial Nerves: Cranial nerves are intact. No cranial nerve deficit. Sensory: No sensory deficit. Motor: Weakness present. No tremor, atrophy, abnormal muscle tone, seizure activity or pronator drift. Coordination: Coordination is intact. Romberg sign negative. Coordination normal. Finger-Nose-Finger Test and Heel to [facility] Test normal.      Gait: Gait abnormal.      Deep Tendon Reflexes: Reflexes abnormal. Babinski sign absent on the right side. Babinski sign absent on the left side. Reflex Scores:       Tricep reflexes are 3+ on the right side and 3+ on the left side. Bicep reflexes are 3+ on the right side and 3+ on the left side. Brachioradialis reflexes are 3+ on the right side and 3+ on the left side. Patellar reflexes are 3+ on the right side and 3+ on the left side. Achilles reflexes are 3+ on the right side and 3+ on the left side. Comments: 4/5 in RUE  Positive Elizabeth's sign bilaterally   Psychiatric:         Mood and Affect: Mood normal.         Behavior: Behavior normal.         Thought Content:  Thought content normal.         Judgment: Judgment normal.            On this date 5/4/2022 I have spent 45 minutes reviewing previous notes, test results and face to face with the patient discussing the diagnosis and importance of compliance with the treatment plan as well as documenting on the day of the visit. An electronic signature was used to authenticate this note.     --Soy Lima MD

## 2022-05-06 ENCOUNTER — PREP FOR PROCEDURE (OUTPATIENT)
Dept: NEUROSURGERY | Age: 67
End: 2022-05-06

## 2022-05-06 DIAGNOSIS — Z01.818 PRE-OP TESTING: Primary | ICD-10-CM

## 2022-05-06 RX ORDER — SODIUM CHLORIDE 9 MG/ML
INJECTION, SOLUTION INTRAVENOUS PRN
Status: CANCELLED | OUTPATIENT
Start: 2022-05-06

## 2022-05-06 RX ORDER — SODIUM CHLORIDE 0.9 % (FLUSH) 0.9 %
5-40 SYRINGE (ML) INJECTION EVERY 12 HOURS SCHEDULED
Status: CANCELLED | OUTPATIENT
Start: 2022-05-06

## 2022-05-06 RX ORDER — SODIUM CHLORIDE 0.9 % (FLUSH) 0.9 %
5-40 SYRINGE (ML) INJECTION PRN
Status: CANCELLED | OUTPATIENT
Start: 2022-05-06

## 2022-05-06 RX ORDER — SODIUM CHLORIDE 9 MG/ML
INJECTION, SOLUTION INTRAVENOUS CONTINUOUS
Status: CANCELLED | OUTPATIENT
Start: 2022-05-06

## 2022-05-10 RX ORDER — MULTIVIT-MIN/IRON/FOLIC ACID/K 18-600-40
CAPSULE ORAL
COMMUNITY

## 2022-05-10 RX ORDER — AMLODIPINE BESYLATE 10 MG/1
10 TABLET ORAL DAILY
COMMUNITY

## 2022-05-10 NOTE — PROGRESS NOTES
[address]   PRE-ADMISSION TESTING GENERAL INSTRUCTIONS  PAT Phone Number: [phone]      GENERAL INSTRUCTIONS:    [x] Antibacterial Soap Shower Night before Surgery  [x] [place] ([facility]) wipe instruction sheet and wipes given. [x] Do not wear makeup, lotions, powders, deodorant. [x] Nothing by mouth after midnight, including gum, candy, mints, or water. [x] You may brush your teeth, gargle, but do NOT swallow water. [x] No tobacco products, illegal drugs, or alcohol within 24 hours of your surgery. [x] Jewelry or valuables should not be brought to the hospital. All body and/or tongue piercing's must be removed prior to arriving to hospital. ALL hair pins must be removed. [x] Bring insurance card and photo ID. [x] Bring copy of living will or healthcare power of  papers to be placed in your electronic record. [x] Transfusion Bracelet: Please bring with you to hospital, day of surgery     PARKING INSTRUCTIONS:     [x] ARRIVAL TIME: 5/23/22 @ 1100  · [x] Enter into the [employer]. Two people may accompany you. Masks are required. · [x] Parking Lot \"I\" is where you will park. It is located on the corner of [address] and [address]. The entrance is on [address]. · To enter, press the button and the gate will lift. A free token will be provided to exit the lot. EDUCATION INSTRUCTIONS:           [x] [address] placed in chart. [x] Pre-admission Testing educational folder given  [x] Incentive Spirometry,coughing & deep breathing exercises reviewed. [x] Medication information sheet(s)   [x] Fluoroscopy-Xray used in surgery reviewed with patient. Educational pamphlet placed in chart. [x] Pain: Post-op pain is normal and to be expected. You will be asked to rate your pain from 0-10. [x] Ask your nurse for your pain medication. [x] Spine Navigator to see in PAT.     MEDICATION INSTRUCTIONS:    [x] Bring a complete list of your medications, please write the last time you took the medicine, give this list to the nurse. [x] Take the following medications the morning of surgery with 1-2 ounces of water: Amlodipine  [x] Stop herbal supplements, NSAIDS (Ibuprofen) and vitamins 5 days before your surgery. [x] Follow physician instructions regarding any blood thinners you may be taking. WHAT TO EXPECT:    [x] The day of surgery you will be greeted and checked in by the [employer].  In addition, you will be registered in the [facility] by a Patient Access Representative. Please bring your photo ID and insurance card. A nurse will greet you in accordance to the time you are needed in the pre-op area to prepare you for surgery. Please do not be discouraged if you are not greeted in the order you arrive as there are many variables that are involved in patient preparation. Your patience is greatly appreciated as you wait for your nurse. Please bring in items such as: books, magazines, newspapers, electronics, or any other items  to occupy your time in the waiting area. [x]  Delays may occur with surgery and staff will make a sincere effort to keep you informed of delays. If any delays occur with your procedure, we apologize ahead of time for your inconvenience as we recognize the value of your time.

## 2022-05-16 ENCOUNTER — TELEPHONE (OUTPATIENT)
Dept: NEUROSURGERY | Age: 67
End: 2022-05-16

## 2022-05-16 NOTE — TELEPHONE ENCOUNTER
Patient called stating he had to cancel PAT and surgery on 5/23/22 with Dr Gela Larsen. [facility] told him he had an appointment in [facility] with spine center for a second opinion and if he proceeded he may be responsible for the bill.

## 2022-05-17 ENCOUNTER — HOSPITAL ENCOUNTER (OUTPATIENT)
Dept: PREADMISSION TESTING | Age: 67
Discharge: HOME | End: 2022-05-17

## 2022-05-17 DIAGNOSIS — Z01.812 PRE-OPERATIVE LABORATORY EXAMINATION: Primary | ICD-10-CM

## 2022-06-13 ENCOUNTER — TELEPHONE (OUTPATIENT)
Dept: NEUROSURGERY | Age: 67
End: 2022-06-13

## 2022-06-13 NOTE — TELEPHONE ENCOUNTER
Patient says his surgery cancelled for May 25. He says he had surgical clearance and wants to reschedule after July 4th.   [phone]

## 2022-06-20 ENCOUNTER — PREP FOR PROCEDURE (OUTPATIENT)
Dept: NEUROSURGERY | Age: 67
End: 2022-06-20

## 2022-06-20 DIAGNOSIS — Z01.818 PRE-OP TESTING: Primary | ICD-10-CM

## 2022-06-20 RX ORDER — SODIUM CHLORIDE 0.9 % (FLUSH) 0.9 %
5-40 SYRINGE (ML) INJECTION EVERY 12 HOURS SCHEDULED
Status: CANCELLED | OUTPATIENT
Start: 2022-06-20

## 2022-06-20 RX ORDER — SODIUM CHLORIDE 9 MG/ML
INJECTION, SOLUTION INTRAVENOUS CONTINUOUS
Status: CANCELLED | OUTPATIENT
Start: 2022-06-20

## 2022-06-20 RX ORDER — SODIUM CHLORIDE 9 MG/ML
INJECTION, SOLUTION INTRAVENOUS PRN
Status: CANCELLED | OUTPATIENT
Start: 2022-06-20

## 2022-06-20 RX ORDER — SODIUM CHLORIDE 0.9 % (FLUSH) 0.9 %
5-40 SYRINGE (ML) INJECTION PRN
Status: CANCELLED | OUTPATIENT
Start: 2022-06-20

## 2022-06-22 NOTE — TELEPHONE ENCOUNTER
Pt called to update Deana that he has been wrking on his preop clearance. Has stress test  Scheduled for 7/6.

## 2022-07-06 ENCOUNTER — TELEPHONE (OUTPATIENT)
Dept: NEUROSURGERY | Age: 67
End: 2022-07-06

## 2022-07-06 NOTE — TELEPHONE ENCOUNTER
Patient wants to talk to [facility], He had a stress test for his heart. [phone]  He has July 18 surgery.

## 2022-07-12 NOTE — PROGRESS NOTES
[address] PRE-ADMISSION TESTING GENERAL INSTRUCTIONS- [facility]-phone number:[phone]    GENERAL INSTRUCTIONS  [x] Antibacterial Soap shower Night before  Surgery  [x] Ready Prep CHG wipe instruction sheet and wipes given. [x] Nothing by mouth after midnight, including gum, candy, mints, or water. [x] You may brush your teeth, gargle, but do NOT swallow water. [x]No smoking, chewing tobacco, illegal drugs, marijuana or alcohol within 24 hours of your surgery. [x] Jewelry, valuables or body piercing's should not be brought to the hospital. All body and/or tongue piercing's must be removed prior to arriving to hospital.  ALL hair pins must be removed. [x] Do not wear makeup, lotions, powders, deodorant. Nail polish as directed by the nurse. [x] Arrange transportation with a responsible adult  to and from the hospital. If you do not have a responsible adult  to transport you, you will need to make arrangements with a medical transportation company (i.e. [employer]. A Uber/taxi/bus is not appropriate unless you are accompanied by a responsible adult ). Arrange for someone to be with you for the remainder of the day and for 24 hours after your procedure due to having had anesthesia. Who will be your  for transportation? Josr Reeves    [x] Bring insurance card and photo ID. [x] Transfusion Bracelet: Please bring with you to hospital, day of surgery    [x] Bring copy of living will or healthcare power of  papers to be placed in your electronic record. PARKING INSTRUCTIONS:   [x] Arrival Time: 5 am,    Two people may accompany you. Everyone will need to wear a mask. · [x] Parking lot '\"I\"  is located on [address] (the corner of [facility]). To enter, press the button and the gate will lift. A free token will be provided to exit the lot. One car per patient is allowed to park in this lot.  All other cars are to park on [address] either in the parking garage or the handicap lot. Walk up the front walk to the [facility], the door will be locked an employee will greet you and let you in. EDUCATION INSTRUCTIONS:           [x] [address] placed in chart. [x] Pre-admission Testing educational folder given  [x] Incentive Spirometry,coughing & deep breathing exercises reviewed. [x]Medication information sheet(s)   [x]Fluoroscopy-Xray used in surgery reviewed with patient. Educational pamphlet placed in chart. [x]Pain: Post-op pain is normal and to be expected. You will be asked to rate your pain from 0-10 (a zero is not acceptable-education is needed). Your post-op pain goal is:   [x] Ask your nurse for your pain medication. MEDICATION INSTRUCTIONS:  [x]Bring a complete list of your medications, please write the last time you took the medicine, give this list to the nurse. [x] Take the following medications the morning of surgery with 1-2 ounces of water: amlodipine      [x] Stop herbal supplements and vitamins 5 days before your surgery. Stop ibuprofen (Nsaids) 7 days before your surgery. [x] Follow physician instructions regarding any blood thinners you may be taking. ibuprofen    WHAT TO EXPECT:  [x] The day of surgery you will be greeted and checked in by the [employer]. Please bring your photo ID and insurance card. A nurse will greet you in accordance to the time you are needed in the pre-op area to prepare you for surgery. Please do not be discouraged if you are not greeted in the order you arrive as there are many variables that are involved in patient preparation. Your patience is greatly appreciated as you wait for your nurse. Please bring in items such as: books, magazines, newspapers, electronics, or any other items  to occupy your time in the waiting area.     [x]  Delays may occur with surgery and staff will make a sincere effort to keep you informed of delays. If any delays occur with your procedure, we apologize ahead of time for your inconvenience as we recognize the value of your time.

## 2022-07-12 NOTE — PROGRESS NOTES
Patient scheduled for surgery on 7/18, anterior cervical three to cervical four, cervical four to cervical five, and cervical five to cervical six discectomy and fusion; posterior cervical three to cervical seven laminectomy; posterior cervical three to thoracic one fusion. Patient states he is smoking, states smokes 1-2 cigarettes a day. Spoke with KAILEY MIRANDA informed her of the above. Would like patient to have nicotine level drawn day of PAT.

## 2022-07-13 ENCOUNTER — HOSPITAL ENCOUNTER (OUTPATIENT)
Dept: PREADMISSION TESTING | Age: 67
Discharge: HOME | End: 2022-07-13
Payer: OTHER GOVERNMENT

## 2022-07-13 ENCOUNTER — HOSPITAL ENCOUNTER (OUTPATIENT)
Dept: GENERAL RADIOLOGY | Age: 67
Discharge: HOME | End: 2022-07-15
Payer: OTHER GOVERNMENT

## 2022-07-13 VITALS
DIASTOLIC BLOOD PRESSURE: 77 MMHG | OXYGEN SATURATION: 97 % | WEIGHT: 127 LBS | RESPIRATION RATE: 18 BRPM | SYSTOLIC BLOOD PRESSURE: 115 MMHG | BODY MASS INDEX: 18.75 KG/M2 | TEMPERATURE: 97.8 F | HEART RATE: 79 BPM

## 2022-07-13 DIAGNOSIS — Z01.812 PRE-OPERATIVE LABORATORY EXAMINATION: Primary | ICD-10-CM

## 2022-07-13 DIAGNOSIS — Z01.818 PRE-OP TESTING: ICD-10-CM

## 2022-07-13 LAB
ABO/RH: NORMAL
ANION GAP SERPL CALCULATED.3IONS-SCNC: 11 MMOL/L (ref 7–16)
ANTIBODY SCREEN: NORMAL
BASOPHILS ABSOLUTE: 0.03 E9/L (ref 0–0.2)
BASOPHILS RELATIVE PERCENT: 0.7 % (ref 0–2)
BUN BLDV-MCNC: 20 MG/DL (ref 6–23)
CALCIUM SERPL-MCNC: 9 MG/DL (ref 8.6–10.2)
CHLORIDE BLD-SCNC: 106 MMOL/L (ref 98–107)
CO2: 23 MMOL/L (ref 22–29)
CREAT SERPL-MCNC: 1 MG/DL (ref 0.7–1.2)
EOSINOPHILS ABSOLUTE: 0.19 E9/L (ref 0.05–0.5)
EOSINOPHILS RELATIVE PERCENT: 4.4 % (ref 0–6)
GFR AFRICAN AMERICAN: >60
GFR NON-AFRICAN AMERICAN: >60 ML/MIN/1.73
GLUCOSE BLD-MCNC: 109 MG/DL (ref 74–99)
HCT VFR BLD CALC: 41.4 % (ref 37–54)
HEMOGLOBIN: 13.7 G/DL (ref 12.5–16.5)
IMMATURE GRANULOCYTES #: 0.02 E9/L
IMMATURE GRANULOCYTES %: 0.5 % (ref 0–5)
INR BLD: 1.1
LYMPHOCYTES ABSOLUTE: 1.23 E9/L (ref 1.5–4)
LYMPHOCYTES RELATIVE PERCENT: 28.2 % (ref 20–42)
MCH RBC QN AUTO: 27.3 PG (ref 26–35)
MCHC RBC AUTO-ENTMCNC: 33.1 % (ref 32–34.5)
MCV RBC AUTO: 82.6 FL (ref 80–99.9)
MONOCYTES ABSOLUTE: 0.45 E9/L (ref 0.1–0.95)
MONOCYTES RELATIVE PERCENT: 10.3 % (ref 2–12)
NEUTROPHILS ABSOLUTE: 2.44 E9/L (ref 1.8–7.3)
NEUTROPHILS RELATIVE PERCENT: 55.9 % (ref 43–80)
PDW BLD-RTO: 16.1 FL (ref 11.5–15)
PLATELET # BLD: 300 E9/L (ref 130–450)
PMV BLD AUTO: 9 FL (ref 7–12)
POTASSIUM REFLEX MAGNESIUM: 4 MMOL/L (ref 3.5–5)
PROTHROMBIN TIME: 11.7 SEC (ref 9.3–12.4)
RBC # BLD: 5.01 E12/L (ref 3.8–5.8)
SODIUM BLD-SCNC: 140 MMOL/L (ref 132–146)
WBC # BLD: 4.4 E9/L (ref 4.5–11.5)

## 2022-07-13 PROCEDURE — 86850 RBC ANTIBODY SCREEN: CPT

## 2022-07-13 PROCEDURE — 71046 X-RAY EXAM CHEST 2 VIEWS: CPT

## 2022-07-13 PROCEDURE — 85025 COMPLETE CBC W/AUTO DIFF WBC: CPT

## 2022-07-13 PROCEDURE — 86900 BLOOD TYPING SEROLOGIC ABO: CPT

## 2022-07-13 PROCEDURE — 93005 ELECTROCARDIOGRAM TRACING: CPT

## 2022-07-13 PROCEDURE — 86901 BLOOD TYPING SEROLOGIC RH(D): CPT

## 2022-07-13 PROCEDURE — G0480 DRUG TEST DEF 1-7 CLASSES: HCPCS

## 2022-07-13 PROCEDURE — 80048 BASIC METABOLIC PNL TOTAL CA: CPT

## 2022-07-13 PROCEDURE — 85610 PROTHROMBIN TIME: CPT

## 2022-07-13 PROCEDURE — 87081 CULTURE SCREEN ONLY: CPT

## 2022-07-13 PROCEDURE — 36415 COLL VENOUS BLD VENIPUNCTURE: CPT

## 2022-07-13 RX ORDER — LISINOPRIL 2.5 MG/1
2.5 TABLET ORAL DAILY
COMMUNITY

## 2022-07-13 NOTE — PROGRESS NOTES
Spoke with Sonia Del Valle regarding patient being unable to urinate during PAT visit d/t urgency with incontinence. Order received from Zbigniew Carrasco to obtain urinalysis and urine culture and sensitivity the day of surgery.

## 2022-07-13 NOTE — PROGRESS NOTES
Spoke with Annie Faulkner, Dr. [address] nurse from the [address] [place] [address]  regarding stress test results and medical clearance. Annie Faulkner stated that she would send the results of the stress test as well as the medical clearance once the request was faxed.   I faxed the request to [phone] per her request.

## 2022-07-13 NOTE — PROGRESS NOTES
Saw pt in [facility], reviewed:    Surgical Procedure-reviewed procedure and post-op events:pre-op/PACU, Unit admission, PT/OT evaluation, Discharge [address] Stay expectations-reviewed importance of early mobilization. Instructions of Spine Precautions given-PT to review on evaluation. Surgical Pathway Booklet-reviewed and given  Post-op/Discharge Instructions-reviewed activity allowances/restrictions  Use of Incentive Spirometer-instructed on importance of use post-op and continued use @ home to prevent complications. Instructions on use given  Setting realistic pain goals-reaching goal before discharge. **Reviewed Cervical Fusion Discharge Instructions    Hx smoker, given pamphlet re: Smoking Cessation and Spine Health. Spoke to pt regarding benefits of smoking cessation and achieving better outcomes continuing to not smoke prior to/post-op. Pt states he has \"quit\" smoking, nicotine level drawn today as pt admitted to continued smoking yesterday(?) to [facility] staff. ORTHOTICS:as ordered    Discharge Plans- home with self/family care. Lives w/sister(?) in [facility] home, bath up/down, not on main floor. Receptive to HHC/Rehab if recommended. Verbalized understanding of all instructions and questions answered. Contact number given.     Lizz Mariano RN, Spine Navigator  [phone] Office  [phone] Cell

## 2022-07-14 ENCOUNTER — ANESTHESIA EVENT (OUTPATIENT)
Dept: OPERATING ROOM | Age: 67
DRG: 453 | End: 2022-07-14
Payer: OTHER GOVERNMENT

## 2022-07-14 LAB
EKG ATRIAL RATE: 67 BPM
EKG P AXIS: 66 DEGREES
EKG P-R INTERVAL: 138 MS
EKG Q-T INTERVAL: 412 MS
EKG QRS DURATION: 80 MS
EKG QTC CALCULATION (BAZETT): 435 MS
EKG R AXIS: 47 DEGREES
EKG T AXIS: 65 DEGREES
EKG VENTRICULAR RATE: 67 BPM
MRSA CULTURE ONLY: NORMAL

## 2022-07-15 NOTE — H&P
Maryjane Ellis (:  1955) is a 78 y.o. male,Established patient, here for evaluation of the following chief complaint(s):  Follow-up (review MRI - Pt states he is having bladder incontinence. )        ASSESSMENT/PLAN:  1. Neck pain  78year old male who presents with neck pain and myelopathy with hyperreflexia, gait instability and loss of dexterity. His MRI shows large herniated disk at C3-C4, C4-C5 and C5-C6 with stenosis from C3-C7. Due to his myelopathy, I am recommending a 2 staged procedure consisting of anterior cervical diskectomy and fusdion at C3-C4, C4-C5 and C5-C6 with posterior C3-C7 laminectomy and C3-T1 fusion R/B/A of surgery have been discussed and he wishes to proceed     No follow-ups on file. Subjective   SUBJECTIVE/OBJECTIVE:  HPI  78year old male who presents with neck pain. Gait instability, loss of dexterity and right arm weakness. This has been going on for over 1 year but has gotten progressively worse over the last 3 months. He also complains of numbness, tingling and weakness in his legs bilaterally. He has tried NSAIDs and physical therapy. The neck pain is described as sharp and achy.   It is rated as a 7/10    Past Medical History:   Diagnosis Date    Cancer ([facility] [address] 2013    Prostate     Surg  Social History     Socioeconomic History    Marital status:      Spouse name: Not on file    Number of children: Not on file    Years of education: Not on file    Highest education level: Not on file   Occupational History    Not on file   Tobacco Use    Smoking status: Every Day     Types: Cigarettes    Smokeless tobacco: Never    Tobacco comments:     1-2 cigarettes a day   Vaping Use    Vaping Use: Never used   Substance and Sexual Activity    Alcohol use: No    Drug use: No    Sexual activity: Not on file   Other Topics Concern    Not on file   Social History Narrative    Not on file     Social Determinants of Health     Financial Resource Strain: Not on file Food Insecurity: Not on file   Transportation Needs: Not on file   Physical Activity: Not on file   Stress: Not on file   Social Connections: Not on file   Intimate Partner Violence: Not on file   Housing Stability: Not on file     History reviewed. No pertinent family history. Scheduled Meds:  Continuous Infusions:  PRN Meds:. No Known Allergies       Review of Systems  Constitutional: Negative. HENT: Negative. Eyes: Negative. Respiratory: Negative. Cardiovascular: Negative. Gastrointestinal: Negative. Endocrine: Negative. Genitourinary: Negative. Musculoskeletal: Positive for arthralgias, neck pain and neck stiffness. Skin: Negative. Allergic/Immunologic: Negative. Neurological: Positive for weakness and numbness. Hematological: Negative. Psychiatric/Behavioral: Negative. Objective   Physical Exam  Vitals reviewed. Constitutional:       General: He is not in acute distress. Appearance: Normal appearance. He is normal weight. He is not ill-appearing, toxic-appearing or diaphoretic. HENT:     Head: Normocephalic and atraumatic. Nose: Nose normal.   Eyes:     General: No scleral icterus. Right eye: No discharge. Left eye: No discharge. Extraocular Movements: Extraocular movements intact. Conjunctiva/sclera: Conjunctivae normal.      Pupils: Pupils are equal, round, and reactive to light. Abdominal:      General: Abdomen is flat. Musculoskeletal:         General: No swelling, tenderness, deformity or signs of injury. Normal range of motion. Right lower leg: No edema. Left lower leg: No edema. Skin:     General: Skin is warm and dry. Capillary Refill: Capillary refill takes less than 2 seconds. Coloration: Skin is not jaundiced or pale. Findings: No bruising, erythema, lesion or rash. Neurological:     Mental Status: He is alert and oriented to person, place, and time. Mental status is at baseline. GCS: GCS eye subscore is 4. GCS verbal subscore is 5. GCS motor subscore is 6. Cranial Nerves: Cranial nerves are intact. No cranial nerve deficit. Sensory: No sensory deficit. Motor: Weakness present. No tremor, atrophy, abnormal muscle tone, seizure activity or pronator drift. Coordination: Coordination is intact. Romberg sign negative. Coordination normal. Finger-Nose-Finger Test and Heel to [facility] Test normal.      Gait: Gait abnormal.      Deep Tendon Reflexes: Reflexes abnormal. Babinski sign absent on the right side. Babinski sign absent on the left side. Reflex Scores:       Tricep reflexes are 3+ on the right side and 3+ on the left side. Bicep reflexes are 3+ on the right side and 3+ on the left side. Brachioradialis reflexes are 3+ on the right side and 3+ on the left side. Patellar reflexes are 3+ on the right side and 3+ on the left side. Achilles reflexes are 3+ on the right side and 3+ on the left side. Comments: 4/5 in RUE  Positive Elizabeth's sign bilaterally   Psychiatric:         Mood and Affect: Mood normal.         Behavior: Behavior normal.         Thought Content:  Thought content normal.

## 2022-07-18 ENCOUNTER — ANESTHESIA (OUTPATIENT)
Dept: OPERATING ROOM | Age: 67
DRG: 453 | End: 2022-07-18
Payer: OTHER GOVERNMENT

## 2022-07-18 ENCOUNTER — APPOINTMENT (OUTPATIENT)
Dept: GENERAL RADIOLOGY | Age: 67
DRG: 453 | End: 2022-07-18
Attending: NEUROLOGICAL SURGERY
Payer: OTHER GOVERNMENT

## 2022-07-18 ENCOUNTER — HOSPITAL ENCOUNTER (INPATIENT)
Age: 67
LOS: 7 days | Discharge: NURSING, REHAB OR LONG-TERM CARE | DRG: 453 | End: 2022-07-25
Attending: NEUROLOGICAL SURGERY | Admitting: NEUROLOGICAL SURGERY
Payer: OTHER GOVERNMENT

## 2022-07-18 DIAGNOSIS — Z01.812 PRE-OPERATIVE LABORATORY EXAMINATION: Primary | ICD-10-CM

## 2022-07-18 DIAGNOSIS — M50.20 HERNIATED DISC, CERVICAL: ICD-10-CM

## 2022-07-18 DIAGNOSIS — M48.02 CERVICAL STENOSIS OF SPINE: ICD-10-CM

## 2022-07-18 DIAGNOSIS — Z01.818 PRE-OP TESTING: ICD-10-CM

## 2022-07-18 PROBLEM — G95.9 CERVICAL MYELOPATHY (HCC): Status: ACTIVE | Noted: 2022-07-18

## 2022-07-18 LAB
BILIRUBIN URINE: NEGATIVE
BLOOD, URINE: NEGATIVE
CLARITY: CLEAR
COLOR: YELLOW
GLUCOSE URINE: NEGATIVE MG/DL
KETONES, URINE: NEGATIVE MG/DL
LEUKOCYTE ESTERASE, URINE: NEGATIVE
NITRITE, URINE: NEGATIVE
PH UA: 6 (ref 5–9)
PROTEIN UA: NEGATIVE MG/DL
SPECIFIC GRAVITY UA: 1.02 (ref 1–1.03)
UROBILINOGEN, URINE: 1 E.U./DL

## 2022-07-18 PROCEDURE — 2580000003 HC RX 258: Performed by: NEUROLOGICAL SURGERY

## 2022-07-18 PROCEDURE — 2500000003 HC RX 250 WO HCPCS

## 2022-07-18 PROCEDURE — 2500000003 HC RX 250 WO HCPCS: Performed by: NEUROLOGICAL SURGERY

## 2022-07-18 PROCEDURE — 6360000002 HC RX W HCPCS: Performed by: NEUROLOGICAL SURGERY

## 2022-07-18 PROCEDURE — 22551 ARTHRD ANT NTRBDY CERVICAL: CPT | Performed by: NEUROLOGICAL SURGERY

## 2022-07-18 PROCEDURE — L0174 CERV SR 2PC THOR EXT PRE OTS: HCPCS | Performed by: NEUROLOGICAL SURGERY

## 2022-07-18 PROCEDURE — C1729 CATH, DRAINAGE: HCPCS | Performed by: NEUROLOGICAL SURGERY

## 2022-07-18 PROCEDURE — 0RB30ZZ EXCISION OF CERVICAL VERTEBRAL DISC, OPEN APPROACH: ICD-10-PCS | Performed by: NEUROLOGICAL SURGERY

## 2022-07-18 PROCEDURE — 3700000000 HC ANESTHESIA ATTENDED CARE: Performed by: NEUROLOGICAL SURGERY

## 2022-07-18 PROCEDURE — 22842 INSERT SPINE FIXATION DEVICE: CPT | Performed by: NEUROLOGICAL SURGERY

## 2022-07-18 PROCEDURE — 7100000000 HC PACU RECOVERY - FIRST 15 MIN: Performed by: NEUROLOGICAL SURGERY

## 2022-07-18 PROCEDURE — 6360000002 HC RX W HCPCS

## 2022-07-18 PROCEDURE — A4217 STERILE WATER/SALINE, 500 ML: HCPCS | Performed by: NEUROLOGICAL SURGERY

## 2022-07-18 PROCEDURE — 0CQM0ZZ REPAIR PHARYNX, OPEN APPROACH: ICD-10-PCS | Performed by: OTOLARYNGOLOGY

## 2022-07-18 PROCEDURE — C1713 ANCHOR/SCREW BN/BN,TIS/BN: HCPCS | Performed by: NEUROLOGICAL SURGERY

## 2022-07-18 PROCEDURE — 22614 ARTHRD PST TQ 1NTRSPC EA ADD: CPT | Performed by: NEUROLOGICAL SURGERY

## 2022-07-18 PROCEDURE — 2580000003 HC RX 258

## 2022-07-18 PROCEDURE — 3600000004 HC SURGERY LEVEL 4 BASE: Performed by: NEUROLOGICAL SURGERY

## 2022-07-18 PROCEDURE — 3700000001 HC ADD 15 MINUTES (ANESTHESIA): Performed by: NEUROLOGICAL SURGERY

## 2022-07-18 PROCEDURE — 0RG20A0 FUSION OF 2 OR MORE CERVICAL VERTEBRAL JOINTS WITH INTERBODY FUSION DEVICE, ANTERIOR APPROACH, ANTERIOR COLUMN, OPEN APPROACH: ICD-10-PCS | Performed by: NEUROLOGICAL SURGERY

## 2022-07-18 PROCEDURE — 7100000001 HC PACU RECOVERY - ADDTL 15 MIN: Performed by: NEUROLOGICAL SURGERY

## 2022-07-18 PROCEDURE — 3209999900 FLUORO FOR SURGICAL PROCEDURES

## 2022-07-18 PROCEDURE — 81003 URINALYSIS AUTO W/O SCOPE: CPT

## 2022-07-18 PROCEDURE — 0RG4071 FUSION OF CERVICOTHORACIC VERTEBRAL JOINT WITH AUTOLOGOUS TISSUE SUBSTITUTE, POSTERIOR APPROACH, POSTERIOR COLUMN, OPEN APPROACH: ICD-10-PCS | Performed by: NEUROLOGICAL SURGERY

## 2022-07-18 PROCEDURE — A4216 STERILE WATER/SALINE, 10 ML: HCPCS | Performed by: NEUROLOGICAL SURGERY

## 2022-07-18 PROCEDURE — C1889 IMPLANT/INSERT DEVICE, NOC: HCPCS | Performed by: NEUROLOGICAL SURGERY

## 2022-07-18 PROCEDURE — 2720000010 HC SURG SUPPLY STERILE: Performed by: NEUROLOGICAL SURGERY

## 2022-07-18 PROCEDURE — 2580000003 HC RX 258: Performed by: PHYSICIAN ASSISTANT

## 2022-07-18 PROCEDURE — 3600000014 HC SURGERY LEVEL 4 ADDTL 15MIN: Performed by: NEUROLOGICAL SURGERY

## 2022-07-18 PROCEDURE — C9359 IMPLNT,BON VOID FILLER-PUTTY: HCPCS | Performed by: NEUROLOGICAL SURGERY

## 2022-07-18 PROCEDURE — 2780000010 HC IMPLANT OTHER: Performed by: NEUROLOGICAL SURGERY

## 2022-07-18 PROCEDURE — 88304 TISSUE EXAM BY PATHOLOGIST: CPT

## 2022-07-18 PROCEDURE — 87088 URINE BACTERIA CULTURE: CPT

## 2022-07-18 PROCEDURE — 2709999900 HC NON-CHARGEABLE SUPPLY: Performed by: NEUROLOGICAL SURGERY

## 2022-07-18 PROCEDURE — 6360000002 HC RX W HCPCS: Performed by: PHYSICIAN ASSISTANT

## 2022-07-18 PROCEDURE — 63015 REMOVE SPINE LAMINA >2 CRVCL: CPT | Performed by: NEUROLOGICAL SURGERY

## 2022-07-18 PROCEDURE — 22552 ARTHRD ANT NTRBD CERVICAL EA: CPT | Performed by: NEUROLOGICAL SURGERY

## 2022-07-18 PROCEDURE — 22600 ARTHRD PST TQ 1NTRSPC CRV: CPT | Performed by: NEUROLOGICAL SURGERY

## 2022-07-18 PROCEDURE — 22845 INSERT SPINE FIXATION DEVICE: CPT | Performed by: NEUROLOGICAL SURGERY

## 2022-07-18 PROCEDURE — 6370000000 HC RX 637 (ALT 250 FOR IP): Performed by: NEUROLOGICAL SURGERY

## 2022-07-18 PROCEDURE — 6360000002 HC RX W HCPCS: Performed by: ANESTHESIOLOGY

## 2022-07-18 PROCEDURE — 1200000000 HC SEMI PRIVATE

## 2022-07-18 PROCEDURE — 0RG2071 FUSION OF 2 OR MORE CERVICAL VERTEBRAL JOINTS WITH AUTOLOGOUS TISSUE SUBSTITUTE, POSTERIOR APPROACH, POSTERIOR COLUMN, OPEN APPROACH: ICD-10-PCS | Performed by: NEUROLOGICAL SURGERY

## 2022-07-18 DEVICE — 3.5MM CAPITOL CURVED ROD, 80MM
Type: IMPLANTABLE DEVICE | Site: SPINE CERVICAL | Status: FUNCTIONAL
Brand: CAPITOL

## 2022-07-18 DEVICE — XTEND 4.2MM VARIABLE ANGLE SCREW, SELF-DRILLING, 14MM
Type: IMPLANTABLE DEVICE | Site: SPINE CERVICAL | Status: FUNCTIONAL
Brand: XTEND

## 2022-07-18 DEVICE — XEMPLIFI DBM PLUS, 10CC
Type: IMPLANTABLE DEVICE | Site: SPINE CERVICAL | Status: FUNCTIONAL
Brand: XEMPLIFI

## 2022-07-18 DEVICE — GRAFT BNE SUB W12XH7XL14MM CANC CORT ANT CERV INTBDY FUS: Type: IMPLANTABLE DEVICE | Site: SPINE CERVICAL | Status: FUNCTIONAL

## 2022-07-18 DEVICE — VIP 4.6MM VARIABLE ANGLE SCREW, SELF-DRILLING, 16MM
Type: IMPLANTABLE DEVICE | Site: SPINE CERVICAL | Status: FUNCTIONAL
Brand: VIP

## 2022-07-18 DEVICE — XTEND ANTERIOR CERVICAL PLATE, 3-LEVEL, 51MM
Type: IMPLANTABLE DEVICE | Site: SPINE CERVICAL | Status: FUNCTIONAL
Brand: XTEND

## 2022-07-18 DEVICE — QUARTEX 3.5MM POLYAXIAL SCREW, 14MM
Type: IMPLANTABLE DEVICE | Site: SPINE CERVICAL | Status: FUNCTIONAL
Brand: QUARTEX

## 2022-07-18 DEVICE — QUARTEX 5.5MM POLYAXIAL SCREW, 26MM
Type: IMPLANTABLE DEVICE | Site: SPINE CERVICAL | Status: FUNCTIONAL
Brand: QUARTEX

## 2022-07-18 DEVICE — QUARTEX THREADED LOCKING CAP
Type: IMPLANTABLE DEVICE | Site: SPINE CERVICAL | Status: FUNCTIONAL
Brand: QUARTEX

## 2022-07-18 DEVICE — XTEND 4.2MM VARIABLE ANGLE SCREW, SELF-DRILLING, 16MM
Type: IMPLANTABLE DEVICE | Site: SPINE CERVICAL | Status: FUNCTIONAL
Brand: XTEND

## 2022-07-18 DEVICE — 3.5MM CAPITOL CURVED ROD, 90MM
Type: IMPLANTABLE DEVICE | Site: SPINE CERVICAL | Status: FUNCTIONAL
Brand: CAPITOL

## 2022-07-18 RX ORDER — ONDANSETRON 2 MG/ML
INJECTION INTRAMUSCULAR; INTRAVENOUS PRN
Status: DISCONTINUED | OUTPATIENT
Start: 2022-07-18 | End: 2022-07-18 | Stop reason: SDUPTHER

## 2022-07-18 RX ORDER — SODIUM CHLORIDE 0.9 % (FLUSH) 0.9 %
5-40 SYRINGE (ML) INJECTION PRN
Status: DISCONTINUED | OUTPATIENT
Start: 2022-07-18 | End: 2022-07-25 | Stop reason: HOSPADM

## 2022-07-18 RX ORDER — LISINOPRIL 2.5 MG/1
2.5 TABLET ORAL DAILY
Status: CANCELLED | OUTPATIENT
Start: 2022-07-18

## 2022-07-18 RX ORDER — TAMSULOSIN HYDROCHLORIDE 0.4 MG/1
0.4 CAPSULE ORAL DAILY
Status: CANCELLED | OUTPATIENT
Start: 2022-07-18

## 2022-07-18 RX ORDER — AMLODIPINE BESYLATE 5 MG/1
10 TABLET ORAL DAILY
Status: CANCELLED | OUTPATIENT
Start: 2022-07-18

## 2022-07-18 RX ORDER — SODIUM CHLORIDE 0.9 % (FLUSH) 0.9 %
5-40 SYRINGE (ML) INJECTION PRN
Status: DISCONTINUED | OUTPATIENT
Start: 2022-07-18 | End: 2022-07-18 | Stop reason: HOSPADM

## 2022-07-18 RX ORDER — DIAPER,BRIEF,INFANT-TODD,DISP
EACH MISCELLANEOUS PRN
Status: DISCONTINUED | OUTPATIENT
Start: 2022-07-18 | End: 2022-07-18 | Stop reason: ALTCHOICE

## 2022-07-18 RX ORDER — MORPHINE SULFATE 4 MG/ML
4 INJECTION, SOLUTION INTRAMUSCULAR; INTRAVENOUS
Status: DISCONTINUED | OUTPATIENT
Start: 2022-07-18 | End: 2022-07-25 | Stop reason: HOSPADM

## 2022-07-18 RX ORDER — SODIUM CHLORIDE, SODIUM LACTATE, POTASSIUM CHLORIDE, CALCIUM CHLORIDE 600; 310; 30; 20 MG/100ML; MG/100ML; MG/100ML; MG/100ML
INJECTION, SOLUTION INTRAVENOUS CONTINUOUS PRN
Status: DISCONTINUED | OUTPATIENT
Start: 2022-07-18 | End: 2022-07-18 | Stop reason: SDUPTHER

## 2022-07-18 RX ORDER — SODIUM CHLORIDE 9 MG/ML
INJECTION, SOLUTION INTRAVENOUS CONTINUOUS
Status: DISCONTINUED | OUTPATIENT
Start: 2022-07-18 | End: 2022-07-20

## 2022-07-18 RX ORDER — SODIUM CHLORIDE 0.9 % (FLUSH) 0.9 %
5-40 SYRINGE (ML) INJECTION EVERY 12 HOURS SCHEDULED
Status: DISCONTINUED | OUTPATIENT
Start: 2022-07-18 | End: 2022-07-18 | Stop reason: HOSPADM

## 2022-07-18 RX ORDER — SODIUM CHLORIDE 0.9 % (FLUSH) 0.9 %
5-40 SYRINGE (ML) INJECTION EVERY 12 HOURS SCHEDULED
Status: DISCONTINUED | OUTPATIENT
Start: 2022-07-18 | End: 2022-07-25 | Stop reason: HOSPADM

## 2022-07-18 RX ORDER — PROPOFOL 10 MG/ML
INJECTION, EMULSION INTRAVENOUS PRN
Status: DISCONTINUED | OUTPATIENT
Start: 2022-07-18 | End: 2022-07-18 | Stop reason: SDUPTHER

## 2022-07-18 RX ORDER — FAMOTIDINE 10 MG/ML
INJECTION, SOLUTION INTRAVENOUS
Status: DISPENSED
Start: 2022-07-18 | End: 2022-07-19

## 2022-07-18 RX ORDER — MIDAZOLAM HYDROCHLORIDE 2 MG/2ML
2 INJECTION, SOLUTION INTRAMUSCULAR; INTRAVENOUS
Status: DISCONTINUED | OUTPATIENT
Start: 2022-07-18 | End: 2022-07-18 | Stop reason: HOSPADM

## 2022-07-18 RX ORDER — DIPHENHYDRAMINE HYDROCHLORIDE 50 MG/ML
25 INJECTION INTRAMUSCULAR; INTRAVENOUS EVERY 6 HOURS PRN
Status: DISCONTINUED | OUTPATIENT
Start: 2022-07-18 | End: 2022-07-25 | Stop reason: HOSPADM

## 2022-07-18 RX ORDER — VANCOMYCIN HYDROCHLORIDE 500 MG/10ML
INJECTION, POWDER, LYOPHILIZED, FOR SOLUTION INTRAVENOUS PRN
Status: DISCONTINUED | OUTPATIENT
Start: 2022-07-18 | End: 2022-07-18 | Stop reason: ALTCHOICE

## 2022-07-18 RX ORDER — DIPHENHYDRAMINE HYDROCHLORIDE 50 MG/ML
12.5 INJECTION INTRAMUSCULAR; INTRAVENOUS
Status: DISCONTINUED | OUTPATIENT
Start: 2022-07-18 | End: 2022-07-18 | Stop reason: HOSPADM

## 2022-07-18 RX ORDER — SENNA AND DOCUSATE SODIUM 50; 8.6 MG/1; MG/1
1 TABLET, FILM COATED ORAL 2 TIMES DAILY
Status: CANCELLED | OUTPATIENT
Start: 2022-07-18

## 2022-07-18 RX ORDER — CYCLOBENZAPRINE HCL 10 MG
10 TABLET ORAL 3 TIMES DAILY PRN
Status: CANCELLED | OUTPATIENT
Start: 2022-07-18

## 2022-07-18 RX ORDER — DROPERIDOL 2.5 MG/ML
0.62 INJECTION, SOLUTION INTRAMUSCULAR; INTRAVENOUS
Status: DISCONTINUED | OUTPATIENT
Start: 2022-07-18 | End: 2022-07-18 | Stop reason: HOSPADM

## 2022-07-18 RX ORDER — ONDANSETRON 2 MG/ML
4 INJECTION INTRAMUSCULAR; INTRAVENOUS EVERY 6 HOURS PRN
Status: DISCONTINUED | OUTPATIENT
Start: 2022-07-18 | End: 2022-07-25 | Stop reason: HOSPADM

## 2022-07-18 RX ORDER — TRAMADOL HYDROCHLORIDE 50 MG/1
50 TABLET ORAL
Status: DISCONTINUED | OUTPATIENT
Start: 2022-07-18 | End: 2022-07-18 | Stop reason: HOSPADM

## 2022-07-18 RX ORDER — GLYCOPYRROLATE 0.2 MG/ML
INJECTION INTRAMUSCULAR; INTRAVENOUS PRN
Status: DISCONTINUED | OUTPATIENT
Start: 2022-07-18 | End: 2022-07-18 | Stop reason: SDUPTHER

## 2022-07-18 RX ORDER — IPRATROPIUM BROMIDE AND ALBUTEROL SULFATE 2.5; .5 MG/3ML; MG/3ML
1 SOLUTION RESPIRATORY (INHALATION)
Status: DISCONTINUED | OUTPATIENT
Start: 2022-07-18 | End: 2022-07-18 | Stop reason: HOSPADM

## 2022-07-18 RX ORDER — HYDRALAZINE HYDROCHLORIDE 20 MG/ML
5 INJECTION INTRAMUSCULAR; INTRAVENOUS
Status: DISCONTINUED | OUTPATIENT
Start: 2022-07-18 | End: 2022-07-18 | Stop reason: HOSPADM

## 2022-07-18 RX ORDER — ONDANSETRON 4 MG/1
4 TABLET, ORALLY DISINTEGRATING ORAL EVERY 8 HOURS PRN
Status: DISCONTINUED | OUTPATIENT
Start: 2022-07-18 | End: 2022-07-25 | Stop reason: HOSPADM

## 2022-07-18 RX ORDER — FENTANYL CITRATE 50 UG/ML
INJECTION, SOLUTION INTRAMUSCULAR; INTRAVENOUS PRN
Status: DISCONTINUED | OUTPATIENT
Start: 2022-07-18 | End: 2022-07-18 | Stop reason: SDUPTHER

## 2022-07-18 RX ORDER — MIDAZOLAM HYDROCHLORIDE 1 MG/ML
INJECTION INTRAMUSCULAR; INTRAVENOUS PRN
Status: DISCONTINUED | OUTPATIENT
Start: 2022-07-18 | End: 2022-07-18 | Stop reason: SDUPTHER

## 2022-07-18 RX ORDER — SODIUM CHLORIDE 9 MG/ML
25 INJECTION, SOLUTION INTRAVENOUS PRN
Status: DISCONTINUED | OUTPATIENT
Start: 2022-07-18 | End: 2022-07-18 | Stop reason: HOSPADM

## 2022-07-18 RX ORDER — SODIUM PHOSPHATE, DIBASIC AND SODIUM PHOSPHATE, MONOBASIC 7; 19 G/133ML; G/133ML
1 ENEMA RECTAL DAILY PRN
Status: DISCONTINUED | OUTPATIENT
Start: 2022-07-18 | End: 2022-07-25 | Stop reason: HOSPADM

## 2022-07-18 RX ORDER — DIPHENHYDRAMINE HCL 25 MG
25 TABLET ORAL EVERY 6 HOURS PRN
Status: DISCONTINUED | OUTPATIENT
Start: 2022-07-18 | End: 2022-07-25 | Stop reason: HOSPADM

## 2022-07-18 RX ORDER — FAMOTIDINE 20 MG/1
20 TABLET, FILM COATED ORAL 2 TIMES DAILY
Status: DISCONTINUED | OUTPATIENT
Start: 2022-07-18 | End: 2022-07-25 | Stop reason: HOSPADM

## 2022-07-18 RX ORDER — SODIUM CHLORIDE 9 MG/ML
INJECTION, SOLUTION INTRAVENOUS PRN
Status: DISCONTINUED | OUTPATIENT
Start: 2022-07-18 | End: 2022-07-25 | Stop reason: HOSPADM

## 2022-07-18 RX ORDER — LIDOCAINE HYDROCHLORIDE 20 MG/ML
INJECTION, SOLUTION INTRAVENOUS PRN
Status: DISCONTINUED | OUTPATIENT
Start: 2022-07-18 | End: 2022-07-18 | Stop reason: SDUPTHER

## 2022-07-18 RX ORDER — MORPHINE SULFATE 2 MG/ML
2 INJECTION, SOLUTION INTRAMUSCULAR; INTRAVENOUS
Status: DISCONTINUED | OUTPATIENT
Start: 2022-07-18 | End: 2022-07-25 | Stop reason: HOSPADM

## 2022-07-18 RX ORDER — BUPIVACAINE HYDROCHLORIDE 2.5 MG/ML
INJECTION, SOLUTION EPIDURAL; INFILTRATION; INTRACAUDAL PRN
Status: DISCONTINUED | OUTPATIENT
Start: 2022-07-18 | End: 2022-07-18 | Stop reason: ALTCHOICE

## 2022-07-18 RX ORDER — BISACODYL 10 MG
10 SUPPOSITORY, RECTAL RECTAL DAILY PRN
Status: DISCONTINUED | OUTPATIENT
Start: 2022-07-18 | End: 2022-07-25 | Stop reason: HOSPADM

## 2022-07-18 RX ORDER — ACETAMINOPHEN 325 MG/1
650 TABLET ORAL
Status: DISCONTINUED | OUTPATIENT
Start: 2022-07-18 | End: 2022-07-18 | Stop reason: HOSPADM

## 2022-07-18 RX ORDER — POLYETHYLENE GLYCOL 3350 17 G/17G
17 POWDER, FOR SOLUTION ORAL DAILY
Status: CANCELLED | OUTPATIENT
Start: 2022-07-18

## 2022-07-18 RX ORDER — DEXAMETHASONE SODIUM PHOSPHATE 10 MG/ML
INJECTION INTRAMUSCULAR; INTRAVENOUS PRN
Status: DISCONTINUED | OUTPATIENT
Start: 2022-07-18 | End: 2022-07-18 | Stop reason: SDUPTHER

## 2022-07-18 RX ORDER — ACETAMINOPHEN 325 MG/1
650 TABLET ORAL EVERY 6 HOURS
Status: DISCONTINUED | OUTPATIENT
Start: 2022-07-18 | End: 2022-07-20 | Stop reason: SDUPTHER

## 2022-07-18 RX ORDER — LIDOCAINE HYDROCHLORIDE AND EPINEPHRINE 5; 5 MG/ML; UG/ML
INJECTION, SOLUTION INFILTRATION; PERINEURAL PRN
Status: DISCONTINUED | OUTPATIENT
Start: 2022-07-18 | End: 2022-07-18 | Stop reason: ALTCHOICE

## 2022-07-18 RX ORDER — ONDANSETRON 2 MG/ML
4 INJECTION INTRAMUSCULAR; INTRAVENOUS
Status: DISCONTINUED | OUTPATIENT
Start: 2022-07-18 | End: 2022-07-18 | Stop reason: HOSPADM

## 2022-07-18 RX ORDER — LABETALOL HYDROCHLORIDE 5 MG/ML
5 INJECTION, SOLUTION INTRAVENOUS
Status: DISCONTINUED | OUTPATIENT
Start: 2022-07-18 | End: 2022-07-18 | Stop reason: HOSPADM

## 2022-07-18 RX ORDER — ROCURONIUM BROMIDE 10 MG/ML
INJECTION, SOLUTION INTRAVENOUS PRN
Status: DISCONTINUED | OUTPATIENT
Start: 2022-07-18 | End: 2022-07-18 | Stop reason: SDUPTHER

## 2022-07-18 RX ORDER — OXYCODONE HYDROCHLORIDE 5 MG/1
5 TABLET ORAL EVERY 4 HOURS PRN
Status: CANCELLED | OUTPATIENT
Start: 2022-07-18

## 2022-07-18 RX ORDER — OXYCODONE HYDROCHLORIDE 5 MG/1
10 TABLET ORAL EVERY 4 HOURS PRN
Status: CANCELLED | OUTPATIENT
Start: 2022-07-18

## 2022-07-18 RX ADMIN — ROCURONIUM BROMIDE 50 MG: 10 INJECTION, SOLUTION INTRAVENOUS at 06:44

## 2022-07-18 RX ADMIN — HYDROMORPHONE HYDROCHLORIDE 0.5 MG: 1 INJECTION, SOLUTION INTRAMUSCULAR; INTRAVENOUS; SUBCUTANEOUS at 14:48

## 2022-07-18 RX ADMIN — SODIUM CHLORIDE: 9 INJECTION, SOLUTION INTRAVENOUS at 06:20

## 2022-07-18 RX ADMIN — FAMOTIDINE 20 MG: 10 INJECTION, SOLUTION INTRAVENOUS at 22:17

## 2022-07-18 RX ADMIN — ROCURONIUM BROMIDE 10 MG: 10 INJECTION, SOLUTION INTRAVENOUS at 12:05

## 2022-07-18 RX ADMIN — MIDAZOLAM 2 MG: 1 INJECTION INTRAMUSCULAR; INTRAVENOUS at 06:37

## 2022-07-18 RX ADMIN — SODIUM CHLORIDE: 9 INJECTION, SOLUTION INTRAVENOUS at 09:55

## 2022-07-18 RX ADMIN — CEFAZOLIN 2000 MG: 2 INJECTION, POWDER, FOR SOLUTION INTRAMUSCULAR; INTRAVENOUS at 06:52

## 2022-07-18 RX ADMIN — FENTANYL CITRATE 50 MCG: 50 INJECTION, SOLUTION INTRAMUSCULAR; INTRAVENOUS at 10:42

## 2022-07-18 RX ADMIN — LIDOCAINE HYDROCHLORIDE 100 MG: 20 INJECTION, SOLUTION INTRAVENOUS at 06:44

## 2022-07-18 RX ADMIN — SUGAMMADEX 232 MG: 100 INJECTION, SOLUTION INTRAVENOUS at 13:01

## 2022-07-18 RX ADMIN — AMPICILLIN SODIUM AND SULBACTAM SODIUM 3000 MG: 2; 1 INJECTION, POWDER, FOR SOLUTION INTRAMUSCULAR; INTRAVENOUS at 18:52

## 2022-07-18 RX ADMIN — SODIUM CHLORIDE, POTASSIUM CHLORIDE, SODIUM LACTATE AND CALCIUM CHLORIDE: 600; 310; 30; 20 INJECTION, SOLUTION INTRAVENOUS at 06:49

## 2022-07-18 RX ADMIN — CEFAZOLIN 2000 MG: 2 INJECTION, POWDER, FOR SOLUTION INTRAMUSCULAR; INTRAVENOUS at 10:42

## 2022-07-18 RX ADMIN — FENTANYL CITRATE 50 MCG: 50 INJECTION, SOLUTION INTRAMUSCULAR; INTRAVENOUS at 09:01

## 2022-07-18 RX ADMIN — ROCURONIUM BROMIDE 30 MG: 10 INJECTION, SOLUTION INTRAVENOUS at 07:50

## 2022-07-18 RX ADMIN — FENTANYL CITRATE 50 MCG: 50 INJECTION, SOLUTION INTRAMUSCULAR; INTRAVENOUS at 09:58

## 2022-07-18 RX ADMIN — MORPHINE SULFATE 2 MG: 2 INJECTION, SOLUTION INTRAMUSCULAR; INTRAVENOUS at 16:46

## 2022-07-18 RX ADMIN — PROPOFOL 150 MG: 10 INJECTION, EMULSION INTRAVENOUS at 06:44

## 2022-07-18 RX ADMIN — MORPHINE SULFATE 4 MG: 4 INJECTION, SOLUTION INTRAMUSCULAR; INTRAVENOUS at 23:40

## 2022-07-18 RX ADMIN — HYDROMORPHONE HYDROCHLORIDE 0.5 MG: 1 INJECTION, SOLUTION INTRAMUSCULAR; INTRAVENOUS; SUBCUTANEOUS at 14:53

## 2022-07-18 RX ADMIN — ROCURONIUM BROMIDE 20 MG: 10 INJECTION, SOLUTION INTRAVENOUS at 09:01

## 2022-07-18 RX ADMIN — DEXAMETHASONE SODIUM PHOSPHATE 10 MG: 10 INJECTION INTRAMUSCULAR; INTRAVENOUS at 06:44

## 2022-07-18 RX ADMIN — GLYCOPYRROLATE 0.2 MG: 0.2 INJECTION, SOLUTION INTRAMUSCULAR; INTRAVENOUS at 07:34

## 2022-07-18 RX ADMIN — PHENYLEPHRINE HYDROCHLORIDE 25 MCG/MIN: 10 INJECTION, SOLUTION INTRAMUSCULAR; INTRAVENOUS; SUBCUTANEOUS at 08:30

## 2022-07-18 RX ADMIN — PHENYLEPHRINE HYDROCHLORIDE 100 MCG: 10 INJECTION INTRAVENOUS at 09:08

## 2022-07-18 RX ADMIN — PHENYLEPHRINE HYDROCHLORIDE 100 MCG: 10 INJECTION INTRAVENOUS at 10:52

## 2022-07-18 RX ADMIN — AMPICILLIN SODIUM AND SULBACTAM SODIUM 3000 MG: 2; 1 INJECTION, POWDER, FOR SOLUTION INTRAMUSCULAR; INTRAVENOUS at 22:18

## 2022-07-18 RX ADMIN — Medication 10 ML: at 22:17

## 2022-07-18 RX ADMIN — FENTANYL CITRATE 50 MCG: 50 INJECTION, SOLUTION INTRAMUSCULAR; INTRAVENOUS at 06:44

## 2022-07-18 RX ADMIN — FENTANYL CITRATE 50 MCG: 50 INJECTION, SOLUTION INTRAMUSCULAR; INTRAVENOUS at 09:15

## 2022-07-18 RX ADMIN — FAMOTIDINE 20 MG: 10 INJECTION, SOLUTION INTRAVENOUS at 14:06

## 2022-07-18 RX ADMIN — SODIUM CHLORIDE: 9 INJECTION, SOLUTION INTRAVENOUS at 18:49

## 2022-07-18 RX ADMIN — ONDANSETRON HYDROCHLORIDE 4 MG: 2 SOLUTION INTRAMUSCULAR; INTRAVENOUS at 12:35

## 2022-07-18 RX ADMIN — PHENYLEPHRINE HYDROCHLORIDE 100 MCG: 10 INJECTION INTRAVENOUS at 10:48

## 2022-07-18 ASSESSMENT — PAIN SCALES - GENERAL
PAINLEVEL_OUTOF10: 7
PAINLEVEL_OUTOF10: 7
PAINLEVEL_OUTOF10: 10
PAINLEVEL_OUTOF10: 6
PAINLEVEL_OUTOF10: 0
PAINLEVEL_OUTOF10: 0
PAINLEVEL_OUTOF10: 4
PAINLEVEL_OUTOF10: 0
PAINLEVEL_OUTOF10: 0

## 2022-07-18 ASSESSMENT — PAIN DESCRIPTION - ONSET
ONSET: ON-GOING

## 2022-07-18 ASSESSMENT — PAIN DESCRIPTION - PAIN TYPE
TYPE: SURGICAL PAIN

## 2022-07-18 ASSESSMENT — PAIN DESCRIPTION - LOCATION
LOCATION: ARM;NECK
LOCATION: NECK
LOCATION: NECK;ARM
LOCATION: ARM;NECK
LOCATION: ARM;NECK

## 2022-07-18 ASSESSMENT — ENCOUNTER SYMPTOMS: DYSPNEA ACTIVITY LEVEL: AFTER AMBULATING 1 FLIGHT OF STAIRS

## 2022-07-18 ASSESSMENT — PAIN DESCRIPTION - FREQUENCY
FREQUENCY: CONTINUOUS
FREQUENCY: CONTINUOUS
FREQUENCY: INTERMITTENT

## 2022-07-18 ASSESSMENT — PAIN DESCRIPTION - DESCRIPTORS
DESCRIPTORS: ACHING;BURNING;CRAMPING
DESCRIPTORS: ACHING;SHARP;SORE
DESCRIPTORS: SHARP;DISCOMFORT;SORE

## 2022-07-18 ASSESSMENT — PAIN DESCRIPTION - ORIENTATION
ORIENTATION: RIGHT
ORIENTATION: ANTERIOR;POSTERIOR
ORIENTATION: RIGHT

## 2022-07-18 ASSESSMENT — LIFESTYLE VARIABLES: SMOKING_STATUS: 0

## 2022-07-18 ASSESSMENT — PAIN - FUNCTIONAL ASSESSMENT
PAIN_FUNCTIONAL_ASSESSMENT: PREVENTS OR INTERFERES SOME ACTIVE ACTIVITIES AND ADLS
PAIN_FUNCTIONAL_ASSESSMENT: 0-10

## 2022-07-18 NOTE — CONSULTS
Hospital Medicine  Consult History & Physical        Reason for consult:  medic management    Date of Service: Pt seen/examined in consultation on 7/18/2022    History Of Present Illness:    Mr. Stella Cherry, a 75y.o. year old male  with pmhx of henriated disks C#-C4 C4-C5  And cervic spine stenosis C3 T1 fusion     Presents for elective procedure 07/18/2022  ANTERIOR C3-C4, C4-C5,  AND C5-C6 CERVICAL DISCECTOMY AND FUSION   POSTERIOR C3-C7 LAMINECTOMY, POSTERIOR C3-T1 FUSION   PHARYNGEAL TEAR REPAIR       Past Medical History:        Diagnosis Date    Cancer ([facility] [address] 2013    Prostate       Past Surgical History:        Procedure Laterality Date    HEMORRHOID SURGERY      SKIN GRAFT         Medications Prior to Admission:    Prior to Admission medications    Medication Sig Start Date End Date Taking? Authorizing Provider   lisinopril (PRINIVIL;ZESTRIL) 2.5 MG tablet Take 2.5 mg by mouth daily    Historical Provider, MD   amLODIPine (NORVASC) 10 MG tablet Take 10 mg by mouth daily    Historical Provider, MD   Cholecalciferol (VITAMIN D) 50 MCG (2000 UT) CAPS capsule Take by mouth Takes at noon    Historical Provider, MD   docusate (COLACE, DULCOLAX) 100 MG CAPS TAKE ONE CAPSULE BY MOUTH TWICE A DAY 11/17/21   Historical Provider, MD   sildenafil (VIAGRA) 100 MG tablet TAKE ONE TABLET BY MOUTH AN HOUR_BEFORE SEX. ONE HOUR BEFORE SEXUAL ACTIVITY (NO MORE THAN 1 DOSE PER 24 HOURS) NO NITRATES 6/25/21   Historical Provider, MD   tamsulosin (FLOMAX) 0.4 MG capsule TAKE ONE CAPSULE BY MOUTH AT BEDTIME 9/30/21   Historical Provider, MD   ibuprofen (IBU) 800 MG tablet Take 1 tablet by mouth every 8 hours as needed for Pain Take with food. 8/5/20   REBECCA Delgadillo NP       Allergies:  Patient has no known allergies. Social History:      TOBACCO:   reports that he has been smoking cigarettes. He has never used smokeless tobacco.  ETOH:   reports no history of alcohol use.       Family History: Reviewed in detail and negative for DM, CAD, Cancer, CVA. Positive as follows:    History reviewed. No pertinent family history. REVIEW OF SYSTEMS:     Gen= den fever  Heent= den epistaxis    Den tinnit  Pulmn= den sob  Cv= den cp  Gi= den abd pain  Gu= den hemat  Msklt= r hand weakness  Derm= den pruritus  Endoc= den change in appetite   Infect= den loss of energy  Hemat= den easy bruising or unusual bleediing  PHYSICAL EXAM:  /82   Pulse 73   Temp 97.8 °F (36.6 °C) (Temporal)   Resp 16   Ht 5' 9\" (1.753 m)   Wt 128 lb (58.1 kg)   SpO2 96%   BMI 18.90 kg/m²   General appearance: elderly not diaphoretic  HEENT: no obv swelling of lips/tongue  Neck: c collar  Respiratory:  cta   Cardiovascular: s1s2  rate,  rhythm with normal S1/S2 , with murmurs  Abdomen: no rigidity  Musculoskeletal: R hand atrophy of intra articul mm  Skin: no hives  Neurologic:  awake and alert  Psychiatric: calm   Labs:           ASSESSMENT:  ANTERIOR C3-C4, C4-C5,  AND C5-C6 CERVICAL DISCECTOMY AND FUSION   POSTERIOR C3-C7 LAMINECTOMY, POSTERIOR C3-T1 FUSION   PHARYNGEAL TEAR REPAIR   Appears medically stable  Hfx of prost ca  Hx of htn well conrolled  on lsinop and amlodip at home    PLAN:  Once able to tolerate oral safely   Resume amlodipine and lisinopril  Sound physic services will follow    Diet: Thank you for allowing us to participate in this patient's care.    +++++++++++++++++++++++++++++++++++++++++++++++++  Casey Urena DO  [address]  +++++++++++++++++++++++++++++++++++++++++++++++++  NOTE: This report was transcribed using voice recognition software. Every effort was made to ensure accuracy; however, inadvertent computerized transcription errors may be present.

## 2022-07-18 NOTE — PROGRESS NOTES
Pt arrived to unit with [place] collar on. Spoke with Dr Marcelo Falk regarding this. Orders for custom collar placed. Also spoke with Dr Marcelo Falk regarding the pt status of NPO. Dr Marcelo Falk stated that the pt needs to be NPO for 2 days per ENT doctor. Also questioned if pt should be monitored or if the pt can stay on this unit. Per Dr Marcelo Falk pt can stay on this unit.

## 2022-07-18 NOTE — ANESTHESIA PRE PROCEDURE
Department of Anesthesiology  Preprocedure Note       Name:  María Elena Pacheco   Age:  75 y.o.  :  1955                                          MRN:  [id number]         Date:  2022      Surgeon: Boris Query):  Kelsey Champion MD    Procedure: Procedure(s):  ANTERIOR C3-C4, C4-C5 AND C5-C6 CERVICAL DISCECTOMY AND FUSION, POSTERIOR C3 AND LAMINECTOMY, POSTERIOR C3-T1-NEEDS O-ARM, C-ARM, REGULAR BED WITH HORSESHOE, BRIGITTE TABLE, CELL SAVER, PLATELET GEL, POSTERIOR AND ANTERIOR  INSTRUMENTATION, GLOBUS  CERVICAL LAMINECTOMY FUSION    Medications prior to admission:   Prior to Admission medications    Medication Sig Start Date End Date Taking? Authorizing Provider   lisinopril (PRINIVIL;ZESTRIL) 2.5 MG tablet Take 2.5 mg by mouth daily    Historical Provider, MD   amLODIPine (NORVASC) 10 MG tablet Take 10 mg by mouth daily    Historical Provider, MD   Cholecalciferol (VITAMIN D) 50 MCG ( UT) CAPS capsule Take by mouth Takes at noon    Historical Provider, MD   docusate (COLACE, DULCOLAX) 100 MG CAPS TAKE ONE CAPSULE BY MOUTH TWICE A DAY 21   Historical Provider, MD   sildenafil (VIAGRA) 100 MG tablet TAKE ONE TABLET BY MOUTH AN HOUR_BEFORE SEX. ONE HOUR BEFORE SEXUAL ACTIVITY (NO MORE THAN 1 DOSE PER 24 HOURS) NO NITRATES 21   Historical Provider, MD   tamsulosin (FLOMAX) 0.4 MG capsule TAKE ONE CAPSULE BY MOUTH AT BEDTIME 21   Historical Provider, MD   ibuprofen (IBU) 800 MG tablet Take 1 tablet by mouth every 8 hours as needed for Pain Take with food.  20   REBECCA Miller - NP       Current medications:    Current Facility-Administered Medications   Medication Dose Route Frequency Provider Last Rate Last Admin    sodium chloride flush 0.9 % injection 5-40 mL  5-40 mL IntraVENous 2 times per day Elta Post, PA-C        sodium chloride flush 0.9 % injection 5-40 mL  5-40 mL IntraVENous PRN Elta Post, PA-C        0.9 % sodium chloride infusion   IntraVENous PRN Ede HESS MATT Reeves        0.9 % sodium chloride infusion   IntraVENous Continuous Carina DESHAWN Reeves PA-C        ceFAZolin (ANCEF) 2,000 mg in sterile water 20 mL IV syringe  2,000 mg IntraVENous On Call to [address], [place]           Allergies:  No Known Allergies    Problem List:    Patient Active Problem List   Diagnosis Code    Cervical spinal stenosis M48.02       Past Medical History:        Diagnosis Date    Cancer ([facility] [address] 2013    Prostate       Past Surgical History:        Procedure Laterality Date    HEMORRHOID SURGERY      SKIN GRAFT         Social History:    Social History     Tobacco Use    Smoking status: Every Day     Types: Cigarettes    Smokeless tobacco: Never    Tobacco comments:     1-2 cigarettes a day   Substance Use Topics    Alcohol use: No                                Ready to quit: Not Answered  Counseling given: Not Answered  Tobacco comments: 1-2 cigarettes a day      Vital Signs (Current):   Vitals:    07/12/22 1105 07/18/22 0529   BP:  124/86   Pulse:  72   Resp:  18   Temp:  36.4 °C (97.6 °F)   TempSrc:  Temporal   SpO2:  96%   Weight: 128 lb (58.1 kg) 128 lb (58.1 kg)   Height: 5' 9\" (1.753 m) 5' 9\" (1.753 m)                                              BP Readings from Last 3 Encounters:   07/18/22 124/86   07/13/22 115/77   05/04/22 138/88       NPO Status: Time of last liquid consumption: 1900                        Time of last solid consumption: 1830                        Date of last liquid consumption: 07/17/22                        Date of last solid food consumption: 07/17/22    BMI:   Wt Readings from Last 3 Encounters:   07/18/22 128 lb (58.1 kg)   07/13/22 127 lb (57.6 kg)   05/04/22 130 lb (59 kg)     Body mass index is 18.9 kg/m².     CBC:   Lab Results   Component Value Date/Time    WBC 4.4 07/13/2022 09:45 AM    RBC 5.01 07/13/2022 09:45 AM    HGB 13.7 07/13/2022 09:45 AM    HCT 41.4 07/13/2022 09:45 AM    MCV 82.6 07/13/2022 09:45 AM    RDW 16.1 07/13/2022 09:45 AM     07/13/2022 09:45 AM       CMP:   Lab Results   Component Value Date/Time     07/13/2022 09:45 AM    K 4.0 07/13/2022 09:45 AM     07/13/2022 09:45 AM    CO2 23 07/13/2022 09:45 AM    BUN 20 07/13/2022 09:45 AM    CREATININE 1.0 07/13/2022 09:45 AM    GFRAA >60 07/13/2022 09:45 AM    LABGLOM >60 07/13/2022 09:45 AM    GLUCOSE 109 07/13/2022 09:45 AM    PROT 6.7 11/10/2021 02:47 PM    CALCIUM 9.0 07/13/2022 09:45 AM    BILITOT 0.6 11/10/2021 02:47 PM    ALKPHOS 71 11/10/2021 02:47 PM    AST 14 11/10/2021 02:47 PM    ALT 10 11/10/2021 02:47 PM       POC Tests: No results for input(s): POCGLU, POCNA, POCK, POCCL, POCBUN, POCHEMO, POCHCT in the last 72 hours. Coags:   Lab Results   Component Value Date/Time    PROTIME 11.7 07/13/2022 09:45 AM    INR 1.1 07/13/2022 09:45 AM    APTT 30.0 09/09/2020 01:35 PM       HCG (If Applicable): No results found for: PREGTESTUR, PREGSERUM, HCG, HCGQUANT     ABGs: No results found for: PHART, PO2ART, SMF3NCL, QGO5SFE, BEART, H9GDJRCK     Type & Screen (If Applicable):  No results found for: LABABO, LABRH    Drug/Infectious Status (If Applicable):  No results found for: HIV, HEPCAB    COVID-19 Screening (If Applicable): No results found for: COVID19    Stress test 7/6/22:  Normal myocardial perfusion stress test.  EF 67%    Anesthesia Evaluation  Patient summary reviewed and Nursing notes reviewed no history of anesthetic complications:   Airway: Mallampati: III  TM distance: >3 FB   Neck ROM: limited  Mouth opening: > = 3 FB   Dental:      Comment: Denies loose missing or chipped teeth     Pulmonary:   (+) decreased breath sounds     (-) not a current smoker ( former smoker ; smoked for 30+ years )          Patient did not smoke on day of surgery.                  Cardiovascular:  Exercise tolerance: poor (<4 METS),   (+) hypertension:, DIXON: after ambulating 1 flight of stairs, hyperlipidemia      ECG reviewed  Rhythm: regular  Rate: normal    Stress test reviewed       Beta Blocker:  Not on Beta Blocker         Neuro/Psych:   Negative Neuro/Psych ROS              GI/Hepatic/Renal: Neg GI/Hepatic/Renal ROS       Hepatitis:         Endo/Other:    (+) malignancy/cancer ( prostate ). Abdominal:             Vascular: negative vascular ROS. Other Findings:           Anesthesia Plan      general     ASA 3     (18 ga right arm )  Induction: intravenous. BIS  MIPS: Postoperative opioids intended and Prophylactic antiemetics administered. Anesthetic plan and risks discussed with patient. Use of blood products discussed with patient whom consented to blood products. Plan discussed with CRNA and attending. Leonor Garcia RN   7/18/2022    Chart reviewed, findings discussed with anesthesiologist . Anesthesia plan of care discussed with pt. Kinza Cortes CRNA      --------DOS anesthesiologist addendum----------  Patient seen and evaluated. Plan for GETA discussed with patient. All patient's questions were answered to his satisfaction. Patient consents to and agrees to GETA.   Idris Gonzalez MD  7/18/22

## 2022-07-18 NOTE — LETTER
[address] [place] [address] Dr Department Medicaid  CERTIFICATION OF NECESSITY  FOR NON-EMERGENCY TRANSPORTATION   BY [facility]      Individual Information   1. Name: Aleyda Turcios 2. [address] [place] [address] Dr Medicaid Billing Number:    3. Address: [address] [place] [address] [address] [place] [address]      Transportation Provider Information   4. Provider Name:    5. [address] [place] [address] Dr Medicaid Provider Number:  National Provider Identifier (NPI):      Certification  7. Criteria:  During transport, this individual requires:  [x] Medical treatment or continuous     supervision by an EMT. [] The administration or regulation of oxygen by another person. [] Supervised protective restraint. 8. Period Beginning Date: 07/25/22   9. Length  [x] Not more than 10 day(s)  [] One Year     Additional Information Relevant to Certification   10. Comments or Explanations, If Necessary or Appropriate   CERVICAL FUSION/GAIT INSTABILITY/MALNUTRITION/AMBULATORY DYSFUNCTION/FALLS RISK      Certifying Practitioner Information   11. Name of Practitioner: DR Jose Barcenas   12. [address] [place] [address] Dr Medicaid Provider Number, If Applicable:  [address] Provider Identifier (NPI):      Signature Information   14. Date of Signature: 07/22/2022 15. Name of Person Signing: Electronically signed by Ivet Koch RN on 7/22/2022 at 3:06 PM     16. Signature and Professional Designation: dr Josh nava/Electronically signed by Ivet Koch RN  on 7/22/2022 at 3:06 PM       ODM 31654  Rev. 7/2015                                                                        [facility] Admission Date/Time: 7/18/22 [address] [place] [address] Account: [de-identified]   MRN: [id number]    Patient:  Aleyda Turcios   Contact Serial #: [id number]            ENCOUNTER          Patient Class: I Private Enc?   No Unit RM BD: SEYZ 5S NS 5219/[id number]-A   Hospital Service: MORA   ADM DX: Herniated disc, cervical*   ADM Provider: Luis Gordillo MD   Procedure: [place] ARTHRODESIS ANT INTER*   ATT Provider: Luis Gordillo MD   REF Provider:    PATIENT  Name: Tea Cárdenas : 1955 (67 yrs)   Address: [address] [place] [address] Sex: Male   Quinton Ivey [address] [address]         Marital Status:    Employer: DISABLED         Muslim: [facility]   Primary Care Provider: Maya Robledo DO         Primary Phone: [phone]   EMERGENCY CONTACT   Contact Name Legal Guardian? Relationship to Patient Home Phone Work Phone   1. Divine Ding  2. Thien Donovan    No Other  Brother/Sister [phone]                 GUARANTOR            Guarantor: Cabreradavid Coffmancheynene Zion     : 1955   Address: [address] [place] [address] Sex: Male   [place] [phone]     Relation to Patient: Self       Home Phone: [phone]   Guarantor ID: [id number]       Work Phone:     Guarantor Employer: DISABLED         Status: DISABLED      COVERAGE        PRIMARY INSURANCE   Payor: Moni Cárdenas Plan: Moni Melia   Payor Address: [facility] [id number], [address]       Group Number:   Insurance Type: INDEMNITY   Subscriber Name: Jennyfer Chou : 1955   Subscriber ID: [id number] Pat. Rel. to Sub: Self   SECONDARY INSURANCE   Payor:   Plan:     Payor Address: ,          Group Number:   Insurance Type:     Subscriber Name:   Subscriber :     Subscriber ID:   Pat.  Rel. to Sub:

## 2022-07-18 NOTE — PROGRESS NOTES
Called [employer] regarding custom collar order. Faxed information over to [employer]     1621:Called answering service for ID consult. Dr Brenton Vega added to care team.     0366: PMR consult called to ext [phone]. Message left. 1625: Message sent to Dr Jesenia Kaiser regarding medical consult. Awaiting response.

## 2022-07-18 NOTE — BRIEF OP NOTE
Brief Postoperative Note      Patient: Jenn Avendaño  YOB: 1955  MRN: [id number]    Date of Procedure: 7/18/2022    Pre-Op Diagnosis: C3-C4, C4-C5, C5-C6 HERNIATED DISC  C3-C7 STENOSIS    Post-Op Diagnosis: Same       Procedure(s):  ANTERIOR C3-C4, C4-C5,  AND C5-C6 CERVICAL DISCECTOMY AND FUSION  POSTERIOR C3-C7 LAMINECTOMY, POSTERIOR C3-T1 FUSION  PHARYNGEAL TEAR REPAIR    Surgeon(s):  MD Deirdre Sanz MD    Assistant:  Resident: Mario Beatty DO    Anesthesia: General    Estimated Blood Loss (mL): less than 031     Complications: Unplanned perforation of  pharynx    Specimens:   ID Type Source Tests Collected by Time Destination   A : CERVICAL DISC Tissue Spine SURGICAL PATHOLOGY Devan Arnold MD 7/18/2022 0519        Implants:  Implant Name Type Inv. Item Serial No.  Lot No. LRB No. Used Action   GRAFT BNE SUB [id number] CANC STANISLAV ANT CERV INTBDY FUS - [id number]  GRAFT BNE SUB [id number] CANC STANISLAV ANT CERV INTBDY FUS [id number] [employer]-  N/A 1 Implanted   GRAFT BNE SUB [id number] CANC STANISLAV ANT CERV INTBDY FUS - [id number]  GRAFT BNE SUB [id number] CANC STANISLAV ANT CERV INTBDY FUS [id number] [employer]-  N/A 1 Implanted   GRAFT BNE SUB [id number] CANC STANISLAV ANT CERV INTBDY FUS - [id number]  GRAFT BNE SUB [id number] CANC STANISLAV ANT CERV INTBDY FUS [id number] [employer]-  N/A 1 Implanted   GRAFT BONE SUB 10CC DEMIN BONE MTRX PLUSXEMPLIFI - [id number]  GRAFT BONE SUB 10CC DEMIN BONE MTRX PLUSXEMPLIFI [id number] [employer]-  N/A 1 Implanted   PLATE SPNL G63NZ ANT CERV TI ALLY LEV 3 XTEND - [id number]  PLATE SPNL [id number] ANT CERV TI ALLY LEV 3 XTEND  [employer]-  N/A 1 Implanted   SCREW SPNL L16MM DIA4. 6MM ANT CERV SELF DRL REE ANG W/ SGL - [id number]  SCREW SPNL L16MM DIA4. 6MM ANT CERV SELF DRL REE ANG W/ SGL  [employer]-WD  N/A 1 Implanted   SCREW SPNL L16MM DIA4. 2MM ANT CERV TI ALLY SELF DRL REE ANG - [id number]  SCREW SPNL L16MM DIA4. 2MM ANT CERV TI ALLY SELF DRL REE ANG  [employer]-WD  N/A 5 Implanted   SCREW SPNL L14MM DIA4. 2MM ANT CERV TI ALLY SELF DRL REE ANG - [id number]  SCREW SPNL L14MM DIA4. 2MM ANT CERV TI ALLY SELF DRL REE ANG  [employer]-WD  N/A 2 Implanted   SCREW SPNL L26MM OD5. 5MM POLYAX QUARTEX - [id number]  SCREW SPNL L26MM OD5. 5MM POLYAX QUARTEX  [employer]WD  N/A 2 Implanted   SCREW SPNL L14MM DIA3. 5MM POLYAX QUARTEX - [id number]  SCREW SPNL L14MM DIA3. 5MM POLYAX QUARTEX  [employer]WD  N/A 8 Implanted   CAP SPNL OCCIPITOCERVICOTHORACIC MONE THRD QUARTEX - [id number]  CAP SPNL OCCIPITOCERVICOTHORACIC MONE THRD QUARTEX  [employer]-WD  N/A 10 Implanted   KENAN SPNL L80MM DIA3.5MM CVD CAPITOL - [id number]  KENAN SPNL L80MM DIA3.5MM CVD CAPITOL  [employer]-WD  N/A 1 Implanted   KENAN SPNL L90MM OD3. 5MM CVD CAPITOL - [id number]  KENAN SPNL L90MM OD3. 5MM CVD CAPITOL  [employer]WD  N/A 1 Implanted         Drains:   Closed/Suction Drain Posterior Neck Accordion (Active)       Urinary Catheter Miranda (Active)       Findings: see dictated op note    Electronically signed by Maya Modi MD on 7/18/2022 at 1:35 PM

## 2022-07-18 NOTE — PROGRESS NOTES
Left upper hand grasp and lower ext are weak at 2+, right upper hand grasp is 3-4+ and right lower is 3-4+ strong.

## 2022-07-18 NOTE — ANESTHESIA POSTPROCEDURE EVALUATION
Department of Anesthesiology  Postprocedure Note    Patient: Danica Ricardo  MRN: [id number]  YOB: 1955  Date of evaluation: 7/18/2022      Procedure Summary     Date: 07/18/22 Room / Location: [facility] OR 06 / [facility]    Anesthesia Start: 6382 Anesthesia Stop: 4451    Procedures:       ANTERIOR C3-C4, C4-C5,  AND C5-C6 CERVICAL DISCECTOMY AND FUSION (Spine Cervical)      POSTERIOR C3-C7 LAMINECTOMY, POSTERIOR C3-T1 FUSION (Spine Cervical)      PHARYNGEAL TEAR REPAIR (Throat) Diagnosis:       Herniated disc, cervical      Cervical stenosis of spine      (C3-C4, C4-C5, C5-C6 HERNIATED DISC  C3-C7 STENOSIS)    Surgeons: Rk Cantu MD Responsible Provider: Andriy Deleon DO    Anesthesia Type: general ASA Status: 3          Anesthesia Type: No value filed.     Jorge Phase I: Jorge Score: 9    Jorge Phase II: Jorge Score: 8      Anesthesia Post Evaluation    Patient location during evaluation: PACU  Patient participation: complete - patient participated  Level of consciousness: awake and alert  Airway patency: patent  Nausea & Vomiting: no nausea and no vomiting  Complications: no  Cardiovascular status: blood pressure returned to baseline  Respiratory status: acceptable  Hydration status: euvolemic  Multimodal analgesia pain management approach

## 2022-07-19 LAB
3-OH-COTININE: 37 NG/ML
ANION GAP SERPL CALCULATED.3IONS-SCNC: 8 MMOL/L (ref 7–16)
BUN BLDV-MCNC: 20 MG/DL (ref 6–23)
CALCIUM SERPL-MCNC: 7.8 MG/DL (ref 8.6–10.2)
CHLORIDE BLD-SCNC: 111 MMOL/L (ref 98–107)
CO2: 23 MMOL/L (ref 22–29)
COTININE: 25 NG/ML
CREAT SERPL-MCNC: 1 MG/DL (ref 0.7–1.2)
GFR AFRICAN AMERICAN: >60
GFR NON-AFRICAN AMERICAN: >60 ML/MIN/1.73
GLUCOSE BLD-MCNC: 117 MG/DL (ref 74–99)
HCT VFR BLD CALC: 33.7 % (ref 37–54)
HEMOGLOBIN: 11.1 G/DL (ref 12.5–16.5)
MCH RBC QN AUTO: 27.3 PG (ref 26–35)
MCHC RBC AUTO-ENTMCNC: 32.9 % (ref 32–34.5)
MCV RBC AUTO: 83 FL (ref 80–99.9)
NICOTINE: <2 NG/ML
PDW BLD-RTO: 16.6 FL (ref 11.5–15)
PLATELET # BLD: 237 E9/L (ref 130–450)
PMV BLD AUTO: 9.6 FL (ref 7–12)
POTASSIUM SERPL-SCNC: 4 MMOL/L (ref 3.5–5)
RBC # BLD: 4.06 E12/L (ref 3.8–5.8)
SODIUM BLD-SCNC: 142 MMOL/L (ref 132–146)
WBC # BLD: 12.5 E9/L (ref 4.5–11.5)

## 2022-07-19 PROCEDURE — 97530 THERAPEUTIC ACTIVITIES: CPT

## 2022-07-19 PROCEDURE — 85027 COMPLETE CBC AUTOMATED: CPT

## 2022-07-19 PROCEDURE — 97535 SELF CARE MNGMENT TRAINING: CPT

## 2022-07-19 PROCEDURE — L0172 CERV COL SR FOAM 2PC PRE OTS: HCPCS

## 2022-07-19 PROCEDURE — 2580000003 HC RX 258: Performed by: NEUROLOGICAL SURGERY

## 2022-07-19 PROCEDURE — 6370000000 HC RX 637 (ALT 250 FOR IP): Performed by: NEUROLOGICAL SURGERY

## 2022-07-19 PROCEDURE — 80048 BASIC METABOLIC PNL TOTAL CA: CPT

## 2022-07-19 PROCEDURE — 6360000002 HC RX W HCPCS: Performed by: NEUROLOGICAL SURGERY

## 2022-07-19 PROCEDURE — 2500000003 HC RX 250 WO HCPCS: Performed by: NEUROLOGICAL SURGERY

## 2022-07-19 PROCEDURE — A4216 STERILE WATER/SALINE, 10 ML: HCPCS | Performed by: NEUROLOGICAL SURGERY

## 2022-07-19 PROCEDURE — 97165 OT EVAL LOW COMPLEX 30 MIN: CPT

## 2022-07-19 PROCEDURE — 97161 PT EVAL LOW COMPLEX 20 MIN: CPT

## 2022-07-19 PROCEDURE — 2700000000 HC OXYGEN THERAPY PER DAY

## 2022-07-19 PROCEDURE — 1200000000 HC SEMI PRIVATE

## 2022-07-19 PROCEDURE — 36415 COLL VENOUS BLD VENIPUNCTURE: CPT

## 2022-07-19 RX ADMIN — MORPHINE SULFATE 4 MG: 4 INJECTION, SOLUTION INTRAMUSCULAR; INTRAVENOUS at 11:26

## 2022-07-19 RX ADMIN — MORPHINE SULFATE 4 MG: 4 INJECTION, SOLUTION INTRAMUSCULAR; INTRAVENOUS at 13:49

## 2022-07-19 RX ADMIN — FAMOTIDINE 20 MG: 10 INJECTION, SOLUTION INTRAVENOUS at 08:49

## 2022-07-19 RX ADMIN — ACETAMINOPHEN 650 MG: 325 TABLET ORAL at 21:20

## 2022-07-19 RX ADMIN — FAMOTIDINE 20 MG: 20 TABLET, FILM COATED ORAL at 21:20

## 2022-07-19 RX ADMIN — Medication 10 ML: at 21:20

## 2022-07-19 RX ADMIN — MORPHINE SULFATE 2 MG: 2 INJECTION, SOLUTION INTRAMUSCULAR; INTRAVENOUS at 21:30

## 2022-07-19 RX ADMIN — AMPICILLIN SODIUM AND SULBACTAM SODIUM 3000 MG: 2; 1 INJECTION, POWDER, FOR SOLUTION INTRAMUSCULAR; INTRAVENOUS at 18:08

## 2022-07-19 RX ADMIN — MORPHINE SULFATE 4 MG: 4 INJECTION, SOLUTION INTRAMUSCULAR; INTRAVENOUS at 02:12

## 2022-07-19 RX ADMIN — MORPHINE SULFATE 2 MG: 2 INJECTION, SOLUTION INTRAMUSCULAR; INTRAVENOUS at 18:04

## 2022-07-19 RX ADMIN — MORPHINE SULFATE 4 MG: 4 INJECTION, SOLUTION INTRAMUSCULAR; INTRAVENOUS at 15:56

## 2022-07-19 RX ADMIN — Medication 10 ML: at 08:52

## 2022-07-19 RX ADMIN — MORPHINE SULFATE 4 MG: 4 INJECTION, SOLUTION INTRAMUSCULAR; INTRAVENOUS at 08:48

## 2022-07-19 RX ADMIN — AMPICILLIN SODIUM AND SULBACTAM SODIUM 3000 MG: 2; 1 INJECTION, POWDER, FOR SOLUTION INTRAMUSCULAR; INTRAVENOUS at 05:00

## 2022-07-19 RX ADMIN — AMPICILLIN SODIUM AND SULBACTAM SODIUM 3000 MG: 2; 1 INJECTION, POWDER, FOR SOLUTION INTRAMUSCULAR; INTRAVENOUS at 11:28

## 2022-07-19 RX ADMIN — AMPICILLIN SODIUM AND SULBACTAM SODIUM 3000 MG: 2; 1 INJECTION, POWDER, FOR SOLUTION INTRAMUSCULAR; INTRAVENOUS at 21:19

## 2022-07-19 RX ADMIN — MORPHINE SULFATE 2 MG: 2 INJECTION, SOLUTION INTRAMUSCULAR; INTRAVENOUS at 23:26

## 2022-07-19 RX ADMIN — MORPHINE SULFATE 4 MG: 4 INJECTION, SOLUTION INTRAMUSCULAR; INTRAVENOUS at 06:26

## 2022-07-19 ASSESSMENT — PAIN DESCRIPTION - DESCRIPTORS
DESCRIPTORS: ACHING;SORE;THROBBING
DESCRIPTORS: ACHING;SHARP;SPASM
DESCRIPTORS: ACHING;SHARP;SPASM
DESCRIPTORS: ACHING;SORE;THROBBING
DESCRIPTORS: ACHING;SHARP;SPASM
DESCRIPTORS: ACHING;SHARP;SPASM
DESCRIPTORS: ACHING;SHARP;SORE

## 2022-07-19 ASSESSMENT — PAIN SCALES - GENERAL
PAINLEVEL_OUTOF10: 5
PAINLEVEL_OUTOF10: 10
PAINLEVEL_OUTOF10: 5
PAINLEVEL_OUTOF10: 10
PAINLEVEL_OUTOF10: 6
PAINLEVEL_OUTOF10: 10
PAINLEVEL_OUTOF10: 10

## 2022-07-19 ASSESSMENT — PAIN DESCRIPTION - LOCATION
LOCATION: NECK

## 2022-07-19 ASSESSMENT — PAIN - FUNCTIONAL ASSESSMENT
PAIN_FUNCTIONAL_ASSESSMENT: PREVENTS OR INTERFERES SOME ACTIVE ACTIVITIES AND ADLS
PAIN_FUNCTIONAL_ASSESSMENT: ACTIVITIES ARE NOT PREVENTED

## 2022-07-19 ASSESSMENT — PAIN DESCRIPTION - ORIENTATION
ORIENTATION: MID
ORIENTATION: ANTERIOR;POSTERIOR
ORIENTATION: LOWER;MID
ORIENTATION: ANTERIOR;POSTERIOR

## 2022-07-19 ASSESSMENT — PAIN DESCRIPTION - PAIN TYPE: TYPE: SURGICAL PAIN

## 2022-07-19 NOTE — PROGRESS NOTES
Hospitalist Progress Note      SYNOPSIS: Patient admitted on 2022 for back pain. Mr. Jesus Avitia, a 75y.o. 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 No perioperative complications. SUBJECTIVE:    Patient seen and examined in his room. Denies any chest pain, nausea, vomiting. Records reviewed. Stable overnight. No other overnight issues reported. Temp (24hrs), Av.1 °F (36.7 °C), Min:97.1 °F (36.2 °C), Max:99.3 °F (37.4 °C)    DIET: Diet NPO Exceptions are: Sips of Water with Meds  CODE: Prior    Intake/Output Summary (Last 24 hours) at 2022 0903  Last data filed at 2022 1905  Gross per 24 hour   Intake 2675 ml   Output 850 ml   Net 1825 ml       OBJECTIVE:    BP (!) 152/95   Pulse 74   Temp 97.5 °F (36.4 °C) (Temporal)   Resp 16   Ht 5' 9\" (1.753 m)   Wt 128 lb (58.1 kg)   SpO2 100%   BMI 18.90 kg/m²     General appearance: No apparent distress, appears stated age and cooperative. HEENT:  Conjunctivae/corneas clear. Neck: Supple. No jugular venous distention. Respiratory: Clear to auscultation bilaterally, normal respiratory effort  Cardiovascular: Regular rate rhythm, normal S1-S2  Abdomen: Soft, nontender, nondistended  Musculoskeletal: No clubbing, cyanosis, no bilateral lower extremity edema. Brisk capillary refill.    Skin:  No rashes  on visible skin  Neurologic: awake, alert and following commands     ASSESSMENT & PLAN:    ASSESSMENT:  ANTERIOR C3-C4, C4-C5,  AND C5-C6 CERVICAL DISCECTOMY AND FUSION   POSTERIOR C3-C7 LAMINECTOMY, POSTERIOR C3-T1 FUSION   PHARYNGEAL TEAR REPAIR   Appears medically stable  Hfx of prost ca  Hx of htn well conrolled  on lsinop and amlodip at home     PLAN:  Once able to tolerate oral safely   Resume amlodipine and lisinopril      DISPOSITION: Possible discharge in the next 24 to 48 hours as per neurosurgery. Medications:  REVIEWED DAILY    Infusion Medications    sodium chloride      sodium chloride 0  (07/18/22 1235)    sodium chloride      sodium chloride 50 mL/hr at 07/18/22 1849     Scheduled Medications    sodium chloride flush  5-40 mL IntraVENous 2 times per day    acetaminophen  650 mg Oral Q6H    famotidine  20 mg Oral BID    Or    famotidine (PEPCID) injection  20 mg IntraVENous BID    ampicillin-sulbactam  3,000 mg IntraVENous Q6H     PRN Meds: sodium chloride, sodium chloride flush, sodium chloride, ondansetron **OR** ondansetron, morphine **OR** morphine, diphenhydrAMINE **OR** diphenhydrAMINE, bisacodyl, fleet    Labs:     Recent Labs     07/19/22  0539   WBC 12.5*   HGB 11.1*   HCT 33.7*          Recent Labs     07/19/22  0539      K 4.0   *   CO2 23   BUN 20   CREATININE 1.0   CALCIUM 7.8*       No results for input(s): PROT, ALB, ALKPHOS, ALT, AST, BILITOT, AMYLASE, LIPASE in the last 72 hours. No results for input(s): INR in the last 72 hours. No results for input(s): Ann Dross in the last 72 hours. Chronic labs:    Lab Results   Component Value Date    INR 1.1 07/13/2022       Radiology: REVIEWED DAILY    +++++++++++++++++++++++++++++++++++++++++++++++++  Skye Boateng MD  [facility] Physician - [address] [place] [address]  +++++++++++++++++++++++++++++++++++++++++++++++++  NOTE: This report was transcribed using voice recognition software. Every effort was made to ensure accuracy; however, inadvertent computerized transcription errors may be present.

## 2022-07-19 NOTE — CONSULTS
[address] [place] [address] [facility]    Consultation Note     Admit Date: 7/18/2022  5:24 AM    Reason for Consult: Prophylaxis for pharyngeal tear    Attending Physician:  Kinga Recio MD     Chief Complaint: Difficulty walking and loss of dexterity    HISTORY OF PRESENT ILLNESS:   The patient is a 75 y.o. man not known to the Infectious Diseases service. The patient is admitted for elective surgery because of neck pain with myelopathy hyperreflexia and gait instability and loss of dexterity. MRI showed a herniated disc C3-4 was 6 with stenosis from C3-C7. Patient went to surgery on 7/18/2022 and he had an anterior C3-C6 cervical discectomy and fusion with a posterior laminectomy from C3-7. Intraoperatively was noted there was a pharyngeal tear which was repaired. ID was asked for prophylactic antibiotics    Past Medical History:        Diagnosis Date    Cancer ([address] 2013    Prostate     Past Surgical History:        Procedure Laterality Date    HEMORRHOID SURGERY      SKIN GRAFT       Current Medications:   Scheduled Meds:   sodium chloride flush  5-40 mL IntraVENous 2 times per day    acetaminophen  650 mg Oral Q6H    famotidine  20 mg Oral BID    Or    famotidine (PEPCID) injection  20 mg IntraVENous BID    ampicillin-sulbactam  3,000 mg IntraVENous Q6H    famotidine         Continuous Infusions:   sodium chloride      sodium chloride 0  (07/18/22 1235)    sodium chloride      sodium chloride 50 mL/hr at 07/18/22 1849     PRN Meds:sodium chloride, sodium chloride flush, sodium chloride, ondansetron **OR** ondansetron, morphine **OR** morphine, diphenhydrAMINE **OR** diphenhydrAMINE, bisacodyl, fleet    Allergies:  Patient has no known allergies.     Social History:   Social History     Socioeconomic History    Marital status:      Spouse name: None    Number of children: None    Years of education: None    Highest education level: None   Tobacco Use    Smoking status: Every Day Types: Cigarettes    Smokeless tobacco: Never    Tobacco comments:     1-2 cigarettes a day   Vaping Use    Vaping Use: Never used   Substance and Sexual Activity    Alcohol use: No    Drug use: No         Family History:   History reviewed. No pertinent family history. . Otherwise non-pertinent to the chief complaint. REVIEW OF SYSTEMS:    CONSTITUTIONAL:  No chills, fevers or night sweats. No loss of weight. EYES:  No double vision or drainage from eyes, ears or throat. HEENT:  No neck stiffness. No dysphagia. No drainage from eyes, ears or throat  RESPIRATORY:  No cough, productive sputum or hemoptysis. CARDIOVASCULAR:  No chest pain, palpitations, orthopnea or dyspnea on exertion. GASTROINTESTINAL:  No nausea, vomiting, diarrhea or constipation or hematochezia   GENITOURINARY:  No frequency burning dysuria or hematuria. INTEGUMENT/BREAST:  No rash or breast masses. HEMATOLOGIC/LYMPHATIC:  No lymphadenopathy or blood dyscrasics. ALLERGIC/IMMUNOLOGIC:  No anaphylaxis. ENDOCRINE:  No polyuria or polydipsia or temperature intolerance. MUSCULOSKELETAL:  No myalgia or arthralgia. Full ROM. NEUROLOGICAL: See history of present illness  BEHAVIOR/PSYCH:  No psychosis. PHYSICAL EXAM:    Vitals:    /82   Pulse 73   Temp 97.8 °F (36.6 °C) (Temporal)   Resp 16   Ht 5' 9\" (1.753 m)   Wt 128 lb (58.1 kg)   SpO2 96%   BMI 18.90 kg/m²   Constitutional: The patient is awake, alert, and oriented. Skin: Warm and dry. No rashes were noted. No jaundice. HEENT: Eyes show round, and reactive pupils. Moist mucous membranes, no ulcerations, no thrush. Neck: Postop soft, high fever  No lymphadenopathy. Chest: No use of accessory muscles to breathe. Symmetrical expansion. Auscultation reveals no wheezing, crackles, or rhonchi. Cardiovascular: S1 and S2 are rhythmic and regular. No murmurs appreciated. Abdomen: Positive bowel sounds to auscultation. Benign to palpation. No masses felt.  No hepatosplenomegaly. Genitourinary: Male  Extremities: No clubbing, no cyanosis, no edema. Musculoskeletal: Equal and symmetrical.  Neurological: Weakness upper and lower extremity 2 out of 5 strength  Lines: peripheral      CBC+dif:  No results for input(s): WBC, HGB, HCT, MCV, PLT, NEUTROABS in the last 72 hours.   No results found for: CRP  No results found for: CRPHS  No results found for: SEDRATE  Lab Results   Component Value Date    ALT 10 11/10/2021    AST 14 11/10/2021    ALKPHOS 71 11/10/2021    BILITOT 0.6 11/10/2021     Lab Results   Component Value Date/Time     07/13/2022 09:45 AM    K 4.0 07/13/2022 09:45 AM     07/13/2022 09:45 AM    CO2 23 07/13/2022 09:45 AM    BUN 20 07/13/2022 09:45 AM    CREATININE 1.0 07/13/2022 09:45 AM    GFRAA >60 07/13/2022 09:45 AM    LABGLOM >60 07/13/2022 09:45 AM    GLUCOSE 109 07/13/2022 09:45 AM    PROT 6.7 11/10/2021 02:47 PM    LABALBU 4.1 11/10/2021 02:47 PM    CALCIUM 9.0 07/13/2022 09:45 AM    BILITOT 0.6 11/10/2021 02:47 PM    ALKPHOS 71 11/10/2021 02:47 PM    AST 14 11/10/2021 02:47 PM    ALT 10 11/10/2021 02:47 PM       Lab Results   Component Value Date/Time    PROTIME 11.7 07/13/2022 09:45 AM    INR 1.1 07/13/2022 09:45 AM       No results found for: TSH    Lab Results   Component Value Date/Time    COLORU Yellow 07/18/2022 06:35 AM    PHUR 6.0 07/18/2022 06:35 AM    WBCUA NONE 11/10/2021 02:47 PM    RBCUA NONE 11/10/2021 02:47 PM    BACTERIA NONE SEEN 11/10/2021 02:47 PM    CLARITYU Clear 07/18/2022 06:35 AM    SPECGRAV 1.025 07/18/2022 06:35 AM    LEUKOCYTESUR Negative 07/18/2022 06:35 AM    UROBILINOGEN 1.0 07/18/2022 06:35 AM    BILIRUBINUR Negative 07/18/2022 06:35 AM    BLOODU Negative 07/18/2022 06:35 AM    GLUCOSEU Negative 07/18/2022 06:35 AM       No results found for: UKX2JAP, BEART, L3GQPCFP, PHART, THGBART, ORW6QTS, PO2ART, LOE7ZDB  Radiology:  [facility] FOR SURGICAL PROCEDURES   Final Result   Intraprocedural fluoroscopic spot images

## 2022-07-19 NOTE — OP NOTE
[address]                  [address] [facility], [address] [place] [address]                                OPERATIVE REPORT    PATIENT NAME: Cindy Hollingsworth               :        1955  MED REC NO:   [id number]                            ROOM:       5219  ACCOUNT NO:   [de-identified]                           ADMIT DATE: 2022  PROVIDER:     Yajaira Collins MD    DATE OF PROCEDURE:  2022    PREOPERATIVE DIAGNOSIS:  Cervical stenosis from C3 to C7. POSTOPERATIVE DIAGNOSIS:  Cervical stenosis from C3 to C7. OPERATIVE PROCEDURE:  Two-stage operation with stage I consisting of  anterior cervical diskectomy and fusion at C3-C4, C4-C5, C5-C6 with use  of Globus FORGE structured machine allograft and Globus XTEND plate and  16 mm screws. Stage II of the operation consisting of bilateral  segmental arthrodesis and fusion from C3 to T1 with use of bilateral C3,  C4, C5, and C6 lateral mass screws and bilateral T1 pedicle screws with  use of locally harvested autograft plus allograft in the form of  XEMPLIFI putty for posterolateral fusion from C3 to T1 with #2 of stage  II consisting of bilateral C3, C4, C5, C6, and C7 laminectomy and #3 of  stage II consisting of use of O-arm assisted navigation with placement  of pedicle screws. ANESTHESIA:  Generalized endotracheal anesthesia. SURGEON:  Yajaira Collins MD    ASSISTANT 1:  _____    COMPLICATIONS:  Pharyngeal perforation. SPECIMEN:  Disk. ESTIMATED BLOOD LOSS:  100 mL. OPERATIVE INDICATIONS:  The patient is a 71-year-old gentleman who  presented to the office with cervical myelopathy with difficulty  ambulating, loss of balance and weakness in his arms and legs.   He had  an MRI that showed that he had severe stenosis from C3 to C7; and after  risks, benefits, and alternatives were discussed with the patient and  his family, it was determined that he would undergo the above-listed  procedure. DESCRIPTION OF OPERATIVE PROCEDURE:  The patient was brought into the  operating room. A time-out was performed where he was identified by his  name, medical record number, and the operative procedure which he was  about to undergo. Next, induction of generalized endotracheal  anesthesia was then commenced. Upon completion of induction of  generalized endotracheal anesthesia, he received preoperative  antibiotics. Miranda catheter was placed. He was then placed in the  supine position on the operating table with his head in a horseshoe and  a shoulder roll in between his shoulder blades. Next, the anterior  aspect of his neck was prepped and draped in the usual sterile fashion. After this was done, #10-blade was used to make a skin incision. Monopolar cautery was used to dissect through subcutaneous tissue. I  then undermined the skin incision both superiorly, inferiorly, medially  and laterally. I placed self-retaining Weitlaner retractor into the  wound. Next, I opened up the platysma sharply in longitudinal fashion  and carried the dissection through the superficial, middle and deep  layers of the anterior cervical fascia staying lateral to trachea and  esophagus and medial to carotid sheath. During the dissection, as I  placed the self-retaining  retractor into the wound, felt a  pop with egress of mucus. It became evident that there had been a  perforation of the pharynx. At that point in time, intraoperative  consultation was obtained with Dr. Jessica Mohamud from ENT and he advised that  we should proceed with initial surgical plan and that he would come in  and repair the defect once I was done with my point of the operation. After this was done, I then proceeded to then identify the prevertebral  space. I used a peanut dissector to dissect the prevertebral space. I  then subsequently elevated the longus colli bilaterally.   I placed a  self-retaining [place] retractor into the wound. I placed a spinal  needle into first identifiable disk space. This was the C3-C4 disk  space. I then subsequently proceeded to then place a [place] post into  the C3 vertebral body and other one to C4 vertebral body. I distracted  across the C3-C4 disk space, performed an annulotomy with a #11-blade. I removed disk material with pituitary rongeurs and curettes. I  prepared both the superior and inferior endplates and after this was  done, I used a small straight curette to then prepare the endplates even  further. I subsequently used #2 Kerrison punch to take down the  posterior longitudinal ligament going from the right uncovertebral joint  to the left uncovertebral joint. After this was done, I placed a #7  Globus rasp followed by #7 Globus trial and ultimately #7 Globus piece  of FORGE structured machine allograft into the C3-C4 disk space. I  removed the Unice Muir post that was in C3 vertebral body. I placed into  the C5 vertebral body. I distracted across the C4-C5 disk space,  performed an annulotomy with a #11-blade removed. I removed disk  material with pituitary rongeurs and curettes. I prepared both the  superior and inferior endplates and after this was done, I took down the  posterior longitudinal ligament going from the right uncovertebral joint  to the left uncovertebral joint. After this was done, I placed #7  Globus rasp followed by #7 Globus trial and ultimately #7 Globus piece  of FORGE structured machine allograft into the C4-C5 disk space. Next,  I removed the Unice Muir post that was in the C4 vertebral body. I placed  into the C6 vertebral body. I distracted across the C5-C6 disk space,  performed an annulotomy with a #11-blade. I removed disk material with  pituitary rongeurs and curettes.   I prepared both the superior and  inferior endplates and after this was done, I took down the posterior  longitudinal ligament going from the right uncovertebral joint to the  left uncovertebral joint. I then placed #7 Globus rasp followed by #7  Globus trial and ultimately #7 Globus piece of FORGE structured machine  allograft into the C5-C6 disk space. I removed the [place] post.  I then  fixed the Globus XTEND plate along the anterior aspect of the spine with  two superior screws going to the C3 vertebral body and two inferior  screws going into the C6 vertebral body. I used intraoperative  fluoroscopy to inspect the construct, it appeared sound. I then  subsequently proceeded to engage the locking mechanisms on the plate. After this was done, I irrigated the wound copiously with  antibiotic-impregnated saline. I then proceeded to then pack the wound  using two _____. The wound was then closed loosely with 3-0 nylon  suture. The patient was then subsequently flipped into prone position  on a Nick table. All pressure points were padded. His hair was  clipped. His posterior cervicothoracic region was prepped and draped in  the usual sterile fashion and this commenced stage II of the two-stage  operation. Once this was done, I then proceeded to use a #10-blade to  make a skin incision. Monopolar cautery was used to dissect through  subcutaneous tissue. I placed self-retaining Weitlaner retractor into  the wound. Next, I opened up the cervicothoracic fascia sharply with  monopolar cautery and exposed the spinous process at C3, C4, C5, C6, C7,  and T1. I then proceeded to perform a subperiosteal dissection to  expose the bilateral lamina and lateral masses at C3, C4, C5, and C6;  bilateral lamina and transverse processes at C7 and T1. I then attached  the O-arm reference frame to the T1 spinous process. Using O-arm  assisted navigation, I placed bilateral T1 pedicle screws. After this  was done, I performed another O-arm spin that showed that screws were  within the pedicles. I then removed the O-arm from the field.   I  subsequently used a Emily technique to cannulate the lateral masses  bilaterally at C3, C4, C5, and C6. I placed 3.5 x 14 mm Globus Quartex  screws. After this was done, I then proceeded to then replace  self-retaining angled cerebellar retractors into the wound. I then  proceeded to use a [place] rongeur to bite up the spinous processes at  C3, C4, C5, C6, and C7. After this was done, I used a high-speed christina  to then drill troughs at the laminar facet lines bilaterally at C3, C4,  C5, C6, and C7. I then used #2 Kerrison punch to then detach the lamina  from the facets. After this was done, I then placed rods into the screw  heads. I locked the rods now using locking caps in a  torque/counter-torque mechanism. I used high-speed christina to decorticate  the lateral masses bilaterally at C3, C4, C5, C6 and transverse  processes at C7 and T1. I irrigated the wound copiously with  antibiotic-impregnated saline. After this was done, I then laid out my  bone grafting material in lateral gutters which consisted of locally  harvested autograft plus allograft in the form of XEMPLIFI putty. I  then subsequently placed a Hemovac drain into the wound and the wound  was then subsequently closed in layers using 0 Vicryl for the fascia,  2-0 Vicryl for the subcutaneous layer, and staples for the skin. A dry  sterile dressing was placed over this. The patient was then flipped in  supine position on to the operating table. After this was done, Dr. Jose A Rivers came in and repaired the pharyngeal perforation primarily with  Vicryl suture. He will dictate a separate operative report for his part  of the operation. After Dr. Jose A Rivers was done, I then proceeded to  close the anterior cervical wound in layers using 2-0 Vicryl for the  platysma, 3-0 Vicryl for the subcutaneous layer, and 4-0 Monocryl in  subcuticular fashion for the skin with Dermabond. A dry sterile  dressing was placed over the incision after the Dermabond in place.   The  counts were correct. I was present for entire case. The complication  was pharyngeal perforation.         Shekhar Dallas MD    D: 07/18/2022 22:42:25       T: 07/18/2022 22:46:42     [email or web address]  Job#: [id number]     Doc#: [id number]    CC:

## 2022-07-19 NOTE — PROGRESS NOTES
Physical Therapy  Physical Therapy Initial Assessment     Name: Maurice Resendez  : 1955  MRN: [id number]      Date of Service: 2022    Evaluating PT:  Gene Gómez, PT, DPT [id number]    Room #:  4079/3351-U  Diagnosis:  Herniated disc, cervical [M50.20]  Cervical stenosis of spine [M48.02]  Cervical spinal stenosis [M48.02]  Cervical stenosis of spinal canal [M48.02]  Cervical myelopathy ([address] [G95.9]  PMHx/PSHx:  Prostate CA  Procedure/Surgery:  ANTERIOR C3-C4, C4-C5,  AND C5-C6 CERVICAL DISCECTOMY AND FUSION; POSTERIOR C3-C7 LAMINECTOMY, POSTERIOR C3-T1 FUSION; PHARYNGEAL TEAR REPAIR (2022)  Precautions:  Falls, spinal precautions, cervical collar, hemovac, O2, stevens catheter  Equipment Needs:  TBD  Equipment Owned:  SPC, wheelchair    SUBJECTIVE:    Pt lives with god-sister in a 3 story home with 4 stair(s) to enter and 0 rail(s). Bed is on 1st floor and bath is on 2nd floor. There is a full flight of stairs with 1 rail to 2nd floor. Pt ambulated short distances with SPC PTA; used wheelchair for long distance mobility. OBJECTIVE:   Initial Evaluation  Date: 2022 Treatment Short Term/ Long Term   Goals   AM-PAC 6 Clicks 75/76     Was pt agreeable to Eval/treatment? Yes     Does pt have pain? /10 head, neck     Bed Mobility  Rolling: Bob  Supine to sit: ModA  Sit to supine: ModA  Scooting: ModA (seated)  Rolling: Independent  Supine to sit:  Independent  Sit to supine: Independent  Scooting: Independent   Transfers Sit to stand: [address]  Stand to sit: ModA  Stand pivot: NT  Sit to stand: Heather  Stand to sit: Heather  Stand pivot: Heather with AAD   Ambulation    5 feet with [employer] ModA  75 feet with AAD Heather   Stair negotiation: ascended and descended  NT  12 step(s) with 1 rail(s) Heather   Wheelchair mobility NT  150 feet Independent   ROM BUE:  See OT note  BLE:  WFL     Strength BUE:  See OT note  RLE:  Grossly 5/5  LLE:  Grossly 3+/5     Balance Sitting EOB:  Bob  Dynamic Standing:  ModA with [employer]  Sitting EOB:  Independent  Dynamic Standing:  Heather with AAD     Pt is A & O x 4  Sensation:  Pt reports B hand, B foot numbness that has been ongoing  Edema:  Unremarkable    Vitals:   HR 86, SpO2 97% (3 L O2) with activity. Patient education  Pt educated on role of PT, spinal precautions, safety during functional mobility, use of call light for assistance. Patient response to education:   Pt verbalized understanding Pt demonstrated skill Pt requires further education in this area   Yes Yes Yes     ASSESSMENT:    Conditions Requiring Skilled Therapeutic Intervention:    [x]Decreased strength     []Decreased ROM  [x]Decreased functional mobility  [x]Decreased balance   [x]Decreased endurance   [x]Decreased posture  [x]Decreased sensation  []Decreased coordination   []Decreased vision  [x]Decreased safety awareness   [x]Increased pain       Comments:  Session cleared by nursing. Patient in semi-Peter's position with cervical collar donned upon arrival; agreeable to PT evaluation with OT collaboration. Required increased time, assistance of trunk and BLE to perform bed mobility via log roll technique. Tolerated sitting EOB for extended period of time while interdisciplinary care was provided. Required increased time, verbal cues related to positioning/sequencing to ensure safety during functional transfers. Static standing performed for ~1 minute using [employer] for BUE support. Exhibited flexed forward standing posture, requiring verbal cues to address. Performed side steps with decreased speed and unsteadiness. Required manipulation of assistive device during activity. Patient left in semi-Peter's position with cervical collar donned, call light in reach. Patient would benefit from continued skilled PT services to address functional deficits and prevent deconditioning.     Treatment:  Patient practiced and was instructed in the following treatment:    Bed mobility - verbal cues to facilitate proper positioning and sequencing regarding log roll technique; physical assistance provided as needed during activity  Static/dynamic sitting - performed to promote upright tolerance and balance maintenance  Functional transfers - verbal cues to facilitate proper positioning and sequencing, particularly related to hand/foot placement; physical assistance provided as needed during activity  Static standing - performed to promote activity tolerance and balance maintenance  Ambulation - verbal cues to facilitate proper positioning and sequencing; physical assistance provided as needed during activity    Pt's/ family goals   1. None stated    Prognosis is good for reaching above PT goals. Patient and or family understand(s) diagnosis, prognosis, and plan of care. Yes    PHYSICAL THERAPY PLAN OF CARE:    PT POC is established based on physician order and patient diagnosis     Referring provider/PT Order:    07/18/22 1545  PT eval and treat  Start:  07/18/22 1545,   End:  07/18/22 1545,   ONE TIME,   Standing Count:  1 Occurrences,   R         Jaime Mireles MD     Diagnosis:  Herniated disc, cervical [M50.20]  Cervical stenosis of spine [M48.02]  Cervical spinal stenosis [M48.02]  Cervical stenosis of spinal canal [M48.02]  Cervical myelopathy (H[facility]) [G95.9]  Specific instructions for next treatment:  Continue to facilitate safe performance of functional mobility; progress as appropriate.     Current Treatment Recommendations:     [x] Strengthening to improve independence with functional mobility   [] ROM to improve independence with functional mobility   [x] Balance Training to improve static/dynamic balance and to reduce fall risk  [x] Endurance Training to improve activity tolerance during functional mobility   [x] Transfer Training to improve safety and independence with all functional transfers   [x] Gait Training to improve gait mechanics, endurance and assess need for appropriate assistive device  [x] Stair Training in preparation for safe discharge home and/or into the community   [x] Positioning to prevent skin breakdown and contractures  [x] Safety and Education Training   [x] Patient/Caregiver Education   [] HEP  [] Other     PT long term treatment goals are located in above grid    Frequency of treatments: 2-5x/week x 1-2 weeks. Time in  0955  Time out  1035    Total Treatment Time  25 minutes     Evaluation Time includes thorough review of current medical information, gathering information on past medical history/social history and prior level of function, completion of standardized testing/informal observation of tasks, assessment of data and education on plan of care and goals.     CPT codes:  [x] Low Complexity PT evaluation 63808  [] Moderate Complexity PT evaluation 41799  [] High Complexity PT evaluation 96299  [] PT Re-evaluation 32341  [] Gait training 19052 0 minutes  [] Manual therapy 52443 0 minutes  [x] Therapeutic activities 75551 25 minutes  [] Therapeutic exercises 84729 0 minutes  [] Neuromuscular reeducation 59030 0 minutes     Filemon Hwang, PT, DPT  [id number]

## 2022-07-19 NOTE — CARE COORDINATION
7/19/22 Transition of Care: patient is inpatient POD 1 of Cervical fusion/anterior and posterior with pharyngeal tear. He is NPO. He has a cervical collar intact. He is currently on iv unasyn q6, iv pepcid bid, iv flds  50hr and iv ms for pain. He is scheduled for an esophagram in the am. He continues with a drain. He is alert and oriented. He is a  and follows at the [facility] on [address] with Dr Renato Whitney. He resided with a friend in two story home. He uses a cane at home. [facility]. PT/OT are pending. ARU to follow patient for possible rehab. Will follow.  Electronically signed by Calista Halsted, RN CM on 7/19/2022 at 3:10 PM

## 2022-07-19 NOTE — PROGRESS NOTES
[address]      Date:2022                                                Patient Name: Jesika Vázquez  MRN: [id number]  : 1955  Room: [address] [place] [address]     Evaluating OT:Tammie Claudio OTR/L   License #  [id number]       Referring Provider: Edgar Lamas MD     Specific Provider Orders/Date: OT evaluation & treatment        Diagnosis: C3-C4, C4-C5, C5-C6 HERNIATED DISC  C3-C7 STENOSIS     Pertinent Medical History:  has a past medical history of Cancer ([address] Surgery: 22: ANTERIOR C3-C4, C4-C5,  AND C5-C6 CERVICAL DISCECTOMY AND FUSION  POSTERIOR C3-C7 LAMINECTOMY, POSTERIOR C3-T1 FUSION  PHARYNGEAL TEAR REPAIR    Past Surgical History:  has a past surgical history that includes Skin graft; Hemorrhoid surgery; cervical fusion (N/A, 2022); cervical fusion (N/A, 2022); and Throat surgery (N/A, 2022). Precautions:  Fall Risk, custom collar, NPO, suction, cervical precautions      Assessment of current deficits     [x] Functional mobility            [x]ADLs           [x] Strength                  [x]Cognition    [x] Functional transfers          [x] IADLs         [x] Safety Awareness   [x]Endurance    [x] Fine Coordination                         [x] Balance      [] Vision/perception   [x]Sensation      []Gross Motor Coordination             [] ROM           [] Delirium                   [] Motor Control      OT PLAN OF CARE   OT POC based on physician orders, patient diagnosis and results of clinical assessment     Frequency/Duration: 2-4 days/wk for 2 weeks PRN   Specific OT Treatment Interventions to include:    Instruction/training on adapted ADL techniques and AE recommendations to increase functional independence within precautions  Training on energy conservation strategies, correct breathing pattern and techniques to improve independence/tolerance for self-care routine  Functional transfer/mobility training/DME recommendations for increased independence, safety, and fall prevention  Patient/Family education to increase follow through with safety techniques and functional independence  Recommendation of environmental modifications for increased safety with functional transfers/mobility and ADLs  Splinting/positioning for increased function, prevention of contractures, and improve skin integrity  Therapeutic exercise to improve motor endurance, ROM, and functional strength for ADLs/functional transfers  Therapeutic activities to facilitate/challenge dynamic balance, stand tolerance for increased safety and independence with ADLs  Therapeutic activities to facilitate gross/fine motor skills for increased independence with ADLs  Positioning to improve skin integrity, interaction with environment and functional independence     Recommended Adaptive Equipment:  TBD in Rehab     Home Living: Pt lives with god-sister in a 3 story with 4 steps to enter with no HR. Bed on 1st, Bath on 2nd level.   Bathroom setup: tub/shower, low toilet   Equipment owned: spc, w/c     Prior Level of Function: Pt. States recently had assist with ADLs & with IADLs; ambulated spc short distances, w/c longer distances  Driving: relies on family  Occupation: retired, tree cutting     Pain Level: 7/10 neck pain  Cognition: A&O: 4/4; Follows 2 step directions              Memory:  F+              Sequencing:  F              Problem solving:  F              Judgement/safety:  F                Functional Assessment:  AM-PAC Daily Activity Raw Score: 9/24    Initial Eval Status  Date: 7-19-22 Treatment Status  Date: STGs = LTGs  Time frame: 10-14 days   Feeding Min A with suction tube to mouth using R hand   Set up   Grooming Max A with oral care   Stand by Assist    UB Dressing Dep   Stand by Assist    LB Dressing Dep   Minimal Assist    Bathing Dep   Minimal Assist Toileting Dep   Minimal Assist    Bed Mobility  Logroll: Min A  Supine to sit: Mod A  Sit to supine: Mod A   Supine to sit: Supervision   Sit to supine: Supervision    Functional Transfers Mod A with sit <> stand, use of ww   Stand by Assist    Functional Mobility Mod A with few side steps towards HOB with ww   Stand by Assist    Balance Sitting:     Static:  Min A/SBA    Dynamic:Min A  Standing: Mod A       Activity Tolerance P+/F-   F+   Visual/  Perceptual Glasses: yes          Vitals spO2 & HR   WFL      Hand Dominance R    AROM (PROM) Strength Additional Info:    RUE  R shld. to 70  Elbow WFL  Wrist 50%  Hand WFL Grossly 3+/5 F+  and Fair FMC/dexterity noted during ADL tasks      LUE L shld. Minimal d/t chronic RC injury  L elbow 30-90  L wrist minimal  L hand, functional , only 25% ext. Grossly 2+/3- F-  and Poor FMC/dexterity noted during ADL tasks. Noted L swan neck deformities         [facility]   Sensation:  No c/o numbness or tingling B hands  Tone: WFL   Edema: none noted B UE     Comments: Upon arrival patient supine in bed, agreeable to OT, cleared by Nursing. Therapist facilitated bed mobility/ ADLs/ functional sitting balance ax./ functional transfer training with focus on safety, technique & precautions. Pt. Instructed RE: safe transfers/mobility, ADLs, role of OT, treatment plan, recs. , prec. At end of session, patient returned to bed in optimal position, all needs met, RN notified, with call light and phone within reach, all lines and tubes intact. Overall patient demonstrated decreased strength, balance, independence & safety during completion of ADL/functional transfer/mobility tasks. Pt would benefit from continued skilled OT to increase safety and independence with completion of ADL/IADL tasks for functional independence and quality of life.      Treatment: OT treatment provided this date includes:   Instruction/training on safety and adapted techniques for completion of ADLs: to increase [place] in self care   Instruction/training on safe functional mobility/transfer techniques: with focus on safety, technique & precautions   Instruction/training on energy conservation/work simplification for completion of ADLs: techniques to increase [place] with self care ADLs & iADLs, work simplification to improve endurance   Proper Positioning/Alignment: for optimal healing, skin integrity to prevent breakdown, decrease edema  Skilled monitoring of vitals: to include BP, spO2 & HR during session  Sitting/standing Balance/Tolerance- to increased balance & activity tolerance during ADLs as well as facilitate proper posture and/or positioning. Therapeutic exercise- Instruction on B UE ROM exercises to improve strength/function for increased [place] with ADLs & iADLs     Rehab Potential: Good for established goals     Patient / Family Goal: to return home soon       Patient and/or family were instructed on functional diagnosis, prognosis/goals and OT plan of care. Demonstrated G understanding. Eval Complexity: Low     Time In: 9:55  Time Out: 10:35  Total Treatment Time: 25    Min Units   OT Eval Low 29603  x     OT Eval Medium 94626       OT Eval High 20268       OT Re-Eval [id number]       Therapeutic Ex 69036       Therapeutic Activities 81982  10  1   ADL/Self Care 87709  15  1   Orthotic Management 87807       Manual 09420       Neuro Re-Ed 05806       Non-Billable Time          Evaluation Time additionally includes thorough review of current medical information, gathering information on past medical history/social history and prior level of function, interpretation of standardized testing/informal observation of tasks, assessment of data and development of plan of care and goals. Tammie Claudio, OTR/L   License #  [id number]

## 2022-07-19 NOTE — PROGRESS NOTES
Consult received. Dr. Marcela Dallas to evaluate patient's appropriateness for ARU. PT and OT evaluations are pending.     Martell Agustin RN, Pre-screener for Acute Rehab  P: [phone]

## 2022-07-19 NOTE — PROGRESS NOTES
11.1 (L) 07/19/2022    HCT 33.7 (L) 07/19/2022    MCV 83.0 07/19/2022     07/19/2022     Lab Results   Component Value Date    NEUTROABS 2.44 07/13/2022    NEUTROABS 2.89 11/10/2021    NEUTROABS 2.50 09/09/2020     No results found for: CRPHS  Lab Results   Component Value Date    ALT 10 11/10/2021    AST 14 11/10/2021    ALKPHOS 71 11/10/2021    BILITOT 0.6 11/10/2021     Lab Results   Component Value Date/Time     07/19/2022 05:39 AM    K 4.0 07/19/2022 05:39 AM    K 4.0 07/13/2022 09:45 AM     07/19/2022 05:39 AM    CO2 23 07/19/2022 05:39 AM    BUN 20 07/19/2022 05:39 AM    CREATININE 1.0 07/19/2022 05:39 AM    CREATININE 1.0 07/13/2022 09:45 AM    CREATININE 1.1 11/10/2021 02:47 PM    GFRAA >60 07/19/2022 05:39 AM    LABGLOM >60 07/19/2022 05:39 AM    GLUCOSE 117 07/19/2022 05:39 AM    PROT 6.7 11/10/2021 02:47 PM    LABALBU 4.1 11/10/2021 02:47 PM    CALCIUM 7.8 07/19/2022 05:39 AM    BILITOT 0.6 11/10/2021 02:47 PM    ALKPHOS 71 11/10/2021 02:47 PM    AST 14 11/10/2021 02:47 PM    ALT 10 11/10/2021 02:47 PM     No results found for: CRP  No results found for: [address]  Radiology:  Reviewed    Microbiology:   No results found for: BC, ORG  No results found for: Volkayla Gin, ORG  No results found for: WNDABS  No results found for: RESPSMEAR  No results found for: MPNEUMO, CLAMYDCU, LABLEGI, AFBCX, FUNGSM, LABFUNG  No results found for: CULTRESP  No results found for: CXCATHTIP  No results found for: BFCS  No results found for: CXSURG  Urine Culture, Routine   Date Value Ref Range Status   07/18/2022 Growth not present, incubation continues  Preliminary     MRSA Culture Only   Date Value Ref Range Status   07/13/2022 Methicillin resistant Staph aureus not isolated  Final   Urine cx no growth     Assessment:  Status post cervical anterior discectomy and fusion and posterior fusion C3-T1 and laminectomy from C3-C7  Incidental for pharyngeal tear     Plan:    Cont Unasyn plan for  at least 10 days of antibiotics  Check cultures  Monitor labs  Will follow with you    Electronically signed by REBECCA Tamayo CNP on 7/19/2022 at 4:17 PM         Pt seen and examined. Above discussed agree with advanced practice nurse. Labs, cultures, and radiographs reviewed. Face to Face encounter occurred. Changes made as necessary.      Tavia Lomax MD

## 2022-07-19 NOTE — PROGRESS NOTES
Department of Neurosurgery  Progress Note    CHIEF COMPLAINT: s/p anterior/posterior cervical fusion    SUBJECTIVE:  c/o sore throat    REVIEW OF SYSTEMS :  Constitutional: Negative for chills and fever. Neurological: Negative for dizziness, tremors and speech change.      OBJECTIVE:   VITALS:  BP (!) 152/95   Pulse 74   Temp 97.5 °F (36.4 °C) (Temporal)   Resp 16   Ht 5' 9\" (1.753 m)   Wt 128 lb (58.1 kg)   SpO2 100%   BMI 18.90 kg/m²   PHYSICAL:  CONSTITUTIONAL:  awake, alert, cooperative, no apparent distress, and appears stated age    DATA:  CBC:   Lab Results   Component Value Date/Time    WBC 12.5 07/19/2022 05:39 AM    RBC 4.06 07/19/2022 05:39 AM    HGB 11.1 07/19/2022 05:39 AM    HCT 33.7 07/19/2022 05:39 AM    MCV 83.0 07/19/2022 05:39 AM    MCH 27.3 07/19/2022 05:39 AM    MCHC 32.9 07/19/2022 05:39 AM    RDW 16.6 07/19/2022 05:39 AM     07/19/2022 05:39 AM    MPV 9.6 07/19/2022 05:39 AM     BMP:    Lab Results   Component Value Date/Time     07/19/2022 05:39 AM    K 4.0 07/19/2022 05:39 AM    K 4.0 07/13/2022 09:45 AM     07/19/2022 05:39 AM    CO2 23 07/19/2022 05:39 AM    BUN 20 07/19/2022 05:39 AM    LABALBU 4.1 11/10/2021 02:47 PM    CREATININE 1.0 07/19/2022 05:39 AM    CALCIUM 7.8 07/19/2022 05:39 AM    GFRAA >60 07/19/2022 05:39 AM    LABGLOM >60 07/19/2022 05:39 AM    GLUCOSE 117 07/19/2022 05:39 AM     PT/INR:    Lab Results   Component Value Date/Time    PROTIME 11.7 07/13/2022 09:45 AM    INR 1.1 07/13/2022 09:45 AM     PTT:    Lab Results   Component Value Date/Time    APTT 30.0 09/09/2020 01:35 PM   [APTT}    Current Inpatient Medications  Current Facility-Administered Medications: 0.9 % sodium chloride infusion, , IntraVENous, PRN  0.9 % sodium chloride infusion, , IntraVENous, Continuous  sodium chloride flush 0.9 % injection 5-40 mL, 5-40 mL, IntraVENous, 2 times per day  sodium chloride flush 0.9 % injection 5-40 mL, 5-40 mL, IntraVENous, PRN  0.9 % sodium chloride infusion, , IntraVENous, PRN  acetaminophen (TYLENOL) tablet 650 mg, 650 mg, Oral, Q6H  ondansetron (ZOFRAN-ODT) disintegrating tablet 4 mg, 4 mg, Oral, Q8H PRN **OR** ondansetron (ZOFRAN) injection 4 mg, 4 mg, IntraVENous, Q6H PRN  0.9 % sodium chloride infusion, , IntraVENous, Continuous  morphine (PF) injection 2 mg, 2 mg, IntraVENous, Q2H PRN **OR** morphine sulfate (PF) injection 4 mg, 4 mg, IntraVENous, Q2H PRN  diphenhydrAMINE (BENADRYL) tablet 25 mg, 25 mg, Oral, Q6H PRN **OR** diphenhydrAMINE (BENADRYL) injection 25 mg, 25 mg, IntraVENous, Q6H PRN  bisacodyl (DULCOLAX) suppository 10 mg, 10 mg, Rectal, Daily PRN  fleet rectal enema 1 enema, 1 enema, Rectal, Daily PRN  famotidine (PEPCID) tablet 20 mg, 20 mg, Oral, BID **OR** famotidine (PEPCID) 20 mg in sodium chloride (PF) 10 mL injection, 20 mg, IntraVENous, BID  ampicillin-sulbactam (UNASYN) 3000 mg in 100 mL NS IVPB minibag, 3,000 mg, IntraVENous, Q6H    ASSESSMENT:   S/p C3-6 ACDF and C3-T1 PCF on 7/18 - stable  Pharyngeal perforation    PLAN:  PT/OT  WBAT with cervical collar  Pain control  NPO  ENT following  AB per ID      Electronically signed by RUBEN Dumont on 7/19/2022 at 8:33 AM s/p anterior

## 2022-07-19 NOTE — PROGRESS NOTES
Messaged Dr Angelica Ruano to see about replacing Calcium of 7.8 today. 1459: No need to replace per Dr Angelica Ruano.

## 2022-07-20 ENCOUNTER — APPOINTMENT (OUTPATIENT)
Dept: GENERAL RADIOLOGY | Age: 67
DRG: 453 | End: 2022-07-20
Attending: NEUROLOGICAL SURGERY
Payer: OTHER GOVERNMENT

## 2022-07-20 LAB
ALBUMIN SERPL-MCNC: 3.2 G/DL (ref 3.5–5.2)
ALP BLD-CCNC: 53 U/L (ref 40–129)
ALT SERPL-CCNC: 20 U/L (ref 0–40)
ANION GAP SERPL CALCULATED.3IONS-SCNC: 9 MMOL/L (ref 7–16)
AST SERPL-CCNC: 41 U/L (ref 0–39)
BASOPHILS ABSOLUTE: 0.02 E9/L (ref 0–0.2)
BASOPHILS RELATIVE PERCENT: 0.2 % (ref 0–2)
BILIRUB SERPL-MCNC: 1.7 MG/DL (ref 0–1.2)
BUN BLDV-MCNC: 15 MG/DL (ref 6–23)
CALCIUM SERPL-MCNC: 8.1 MG/DL (ref 8.6–10.2)
CHLORIDE BLD-SCNC: 109 MMOL/L (ref 98–107)
CO2: 25 MMOL/L (ref 22–29)
CREAT SERPL-MCNC: 0.8 MG/DL (ref 0.7–1.2)
EOSINOPHILS ABSOLUTE: 0 E9/L (ref 0.05–0.5)
EOSINOPHILS RELATIVE PERCENT: 0 % (ref 0–6)
GFR AFRICAN AMERICAN: >60
GFR NON-AFRICAN AMERICAN: >60 ML/MIN/1.73
GLUCOSE BLD-MCNC: 106 MG/DL (ref 74–99)
HCT VFR BLD CALC: 31.2 % (ref 37–54)
HEMOGLOBIN: 10.4 G/DL (ref 12.5–16.5)
IMMATURE GRANULOCYTES #: 0.04 E9/L
IMMATURE GRANULOCYTES %: 0.3 % (ref 0–5)
LYMPHOCYTES ABSOLUTE: 0.64 E9/L (ref 1.5–4)
LYMPHOCYTES RELATIVE PERCENT: 5 % (ref 20–42)
MCH RBC QN AUTO: 28 PG (ref 26–35)
MCHC RBC AUTO-ENTMCNC: 33.3 % (ref 32–34.5)
MCV RBC AUTO: 84.1 FL (ref 80–99.9)
METER GLUCOSE: 109 MG/DL (ref 74–99)
MONOCYTES ABSOLUTE: 1.08 E9/L (ref 0.1–0.95)
MONOCYTES RELATIVE PERCENT: 8.5 % (ref 2–12)
NEUTROPHILS ABSOLUTE: 10.91 E9/L (ref 1.8–7.3)
NEUTROPHILS RELATIVE PERCENT: 86 % (ref 43–80)
PDW BLD-RTO: 16.3 FL (ref 11.5–15)
PLATELET # BLD: 194 E9/L (ref 130–450)
PMV BLD AUTO: 10 FL (ref 7–12)
POTASSIUM SERPL-SCNC: 3.8 MMOL/L (ref 3.5–5)
RBC # BLD: 3.71 E12/L (ref 3.8–5.8)
SODIUM BLD-SCNC: 143 MMOL/L (ref 132–146)
TOTAL PROTEIN: 5.8 G/DL (ref 6.4–8.3)
URINE CULTURE, ROUTINE: NORMAL
WBC # BLD: 12.7 E9/L (ref 4.5–11.5)

## 2022-07-20 PROCEDURE — 1200000000 HC SEMI PRIVATE

## 2022-07-20 PROCEDURE — 6360000004 HC RX CONTRAST MEDICATION: Performed by: RADIOLOGY

## 2022-07-20 PROCEDURE — 80053 COMPREHEN METABOLIC PANEL: CPT

## 2022-07-20 PROCEDURE — 36415 COLL VENOUS BLD VENIPUNCTURE: CPT

## 2022-07-20 PROCEDURE — 2580000003 HC RX 258: Performed by: NEUROLOGICAL SURGERY

## 2022-07-20 PROCEDURE — 85025 COMPLETE CBC W/AUTO DIFF WBC: CPT

## 2022-07-20 PROCEDURE — 2580000003 HC RX 258: Performed by: PHYSICIAN ASSISTANT

## 2022-07-20 PROCEDURE — 97530 THERAPEUTIC ACTIVITIES: CPT

## 2022-07-20 PROCEDURE — 6370000000 HC RX 637 (ALT 250 FOR IP): Performed by: NEUROLOGICAL SURGERY

## 2022-07-20 PROCEDURE — A4216 STERILE WATER/SALINE, 10 ML: HCPCS | Performed by: NEUROLOGICAL SURGERY

## 2022-07-20 PROCEDURE — 2500000003 HC RX 250 WO HCPCS: Performed by: NEUROLOGICAL SURGERY

## 2022-07-20 PROCEDURE — 6360000002 HC RX W HCPCS: Performed by: NEUROLOGICAL SURGERY

## 2022-07-20 PROCEDURE — 97535 SELF CARE MNGMENT TRAINING: CPT

## 2022-07-20 PROCEDURE — 82962 GLUCOSE BLOOD TEST: CPT

## 2022-07-20 PROCEDURE — 74220 X-RAY XM ESOPHAGUS 1CNTRST: CPT

## 2022-07-20 RX ORDER — ACETAMINOPHEN 160 MG/5ML
650 SOLUTION ORAL EVERY 6 HOURS
Status: DISCONTINUED | OUTPATIENT
Start: 2022-07-20 | End: 2022-07-21

## 2022-07-20 RX ORDER — DEXTROSE MONOHYDRATE 100 MG/ML
INJECTION, SOLUTION INTRAVENOUS CONTINUOUS PRN
Status: DISCONTINUED | OUTPATIENT
Start: 2022-07-20 | End: 2022-07-25 | Stop reason: HOSPADM

## 2022-07-20 RX ORDER — SODIUM CHLORIDE 9 MG/ML
INJECTION, SOLUTION INTRAVENOUS CONTINUOUS
Status: DISCONTINUED | OUTPATIENT
Start: 2022-07-20 | End: 2022-07-23

## 2022-07-20 RX ADMIN — MORPHINE SULFATE 2 MG: 2 INJECTION, SOLUTION INTRAMUSCULAR; INTRAVENOUS at 22:45

## 2022-07-20 RX ADMIN — SODIUM CHLORIDE: 9 INJECTION, SOLUTION INTRAVENOUS at 02:08

## 2022-07-20 RX ADMIN — SODIUM CHLORIDE: 9 INJECTION, SOLUTION INTRAVENOUS at 15:42

## 2022-07-20 RX ADMIN — DIATRIZOATE MEGLUMINE AND DIATRIZOATE SODIUM 120 ML: 660; 100 LIQUID ORAL; RECTAL at 12:02

## 2022-07-20 RX ADMIN — FAMOTIDINE 20 MG: 10 INJECTION, SOLUTION INTRAVENOUS at 22:45

## 2022-07-20 RX ADMIN — Medication 10 ML: at 09:26

## 2022-07-20 RX ADMIN — ACETAMINOPHEN 650 MG: 325 TABLET ORAL at 04:30

## 2022-07-20 RX ADMIN — FAMOTIDINE 20 MG: 10 INJECTION, SOLUTION INTRAVENOUS at 09:26

## 2022-07-20 RX ADMIN — SODIUM CHLORIDE: 9 INJECTION, SOLUTION INTRAVENOUS at 22:44

## 2022-07-20 RX ADMIN — MORPHINE SULFATE 2 MG: 2 INJECTION, SOLUTION INTRAMUSCULAR; INTRAVENOUS at 09:26

## 2022-07-20 RX ADMIN — MORPHINE SULFATE 2 MG: 2 INJECTION, SOLUTION INTRAMUSCULAR; INTRAVENOUS at 04:30

## 2022-07-20 RX ADMIN — MORPHINE SULFATE 2 MG: 2 INJECTION, SOLUTION INTRAMUSCULAR; INTRAVENOUS at 02:06

## 2022-07-20 RX ADMIN — MORPHINE SULFATE 4 MG: 4 INJECTION, SOLUTION INTRAMUSCULAR; INTRAVENOUS at 13:00

## 2022-07-20 RX ADMIN — MORPHINE SULFATE 4 MG: 4 INJECTION, SOLUTION INTRAMUSCULAR; INTRAVENOUS at 18:16

## 2022-07-20 RX ADMIN — AMPICILLIN SODIUM AND SULBACTAM SODIUM 3000 MG: 2; 1 INJECTION, POWDER, FOR SOLUTION INTRAMUSCULAR; INTRAVENOUS at 17:35

## 2022-07-20 RX ADMIN — AMPICILLIN SODIUM AND SULBACTAM SODIUM 3000 MG: 2; 1 INJECTION, POWDER, FOR SOLUTION INTRAMUSCULAR; INTRAVENOUS at 04:32

## 2022-07-20 RX ADMIN — AMPICILLIN SODIUM AND SULBACTAM SODIUM 3000 MG: 2; 1 INJECTION, POWDER, FOR SOLUTION INTRAMUSCULAR; INTRAVENOUS at 22:43

## 2022-07-20 RX ADMIN — AMPICILLIN SODIUM AND SULBACTAM SODIUM 3000 MG: 2; 1 INJECTION, POWDER, FOR SOLUTION INTRAMUSCULAR; INTRAVENOUS at 14:15

## 2022-07-20 ASSESSMENT — PAIN SCALES - GENERAL
PAINLEVEL_OUTOF10: 7
PAINLEVEL_OUTOF10: 6
PAINLEVEL_OUTOF10: 6
PAINLEVEL_OUTOF10: 7
PAINLEVEL_OUTOF10: 5
PAINLEVEL_OUTOF10: 6
PAINLEVEL_OUTOF10: 7
PAINLEVEL_OUTOF10: 10
PAINLEVEL_OUTOF10: 9

## 2022-07-20 ASSESSMENT — PAIN - FUNCTIONAL ASSESSMENT
PAIN_FUNCTIONAL_ASSESSMENT: PREVENTS OR INTERFERES SOME ACTIVE ACTIVITIES AND ADLS
PAIN_FUNCTIONAL_ASSESSMENT: ACTIVITIES ARE NOT PREVENTED
PAIN_FUNCTIONAL_ASSESSMENT: ACTIVITIES ARE NOT PREVENTED
PAIN_FUNCTIONAL_ASSESSMENT: PREVENTS OR INTERFERES SOME ACTIVE ACTIVITIES AND ADLS

## 2022-07-20 ASSESSMENT — PAIN DESCRIPTION - DESCRIPTORS
DESCRIPTORS: BURNING;ACHING;SHARP;STABBING
DESCRIPTORS: ACHING;TENDER;SORE
DESCRIPTORS: ACHING;SORE;SPASM
DESCRIPTORS: THROBBING;SORE;DISCOMFORT
DESCRIPTORS: ACHING;SORE;THROBBING

## 2022-07-20 ASSESSMENT — PAIN DESCRIPTION - ORIENTATION
ORIENTATION: ANTERIOR;POSTERIOR;LEFT
ORIENTATION: LOWER
ORIENTATION: ANTERIOR;POSTERIOR
ORIENTATION: LOWER
ORIENTATION: RIGHT;LEFT;ANTERIOR;POSTERIOR
ORIENTATION: LOWER

## 2022-07-20 ASSESSMENT — PAIN DESCRIPTION - FREQUENCY
FREQUENCY: CONTINUOUS

## 2022-07-20 ASSESSMENT — PAIN DESCRIPTION - PAIN TYPE
TYPE: SURGICAL PAIN

## 2022-07-20 ASSESSMENT — PAIN DESCRIPTION - ONSET
ONSET: ON-GOING

## 2022-07-20 ASSESSMENT — PAIN DESCRIPTION - LOCATION
LOCATION: NECK
LOCATION: NECK
LOCATION: NECK;ARM
LOCATION: NECK

## 2022-07-20 NOTE — PROGRESS NOTES
Physical Therapy  Physical Therapy Treatment     Name: Xochitl Daily  : 1955  MRN: [id number]      Date of Service: 2022    Evaluating PT:  Filemon Hwang, PT, DPT [id number]    Room #:  7601/6759-E  Diagnosis:  Herniated disc, cervical [M50.20]  Cervical stenosis of spine [M48.02]  Cervical spinal stenosis [M48.02]  Cervical stenosis of spinal canal [M48.02]  Cervical myelopathy ([address] [G95.9]  PMHx/PSHx:  Prostate CA  Procedure/Surgery:  ANTERIOR C3-C4, C4-C5,  AND C5-C6 CERVICAL DISCECTOMY AND FUSION; POSTERIOR C3-C7 LAMINECTOMY, POSTERIOR C3-T1 FUSION; PHARYNGEAL TEAR REPAIR (2022)  Precautions:  Falls, spinal precautions, cervical collar, hemovac, , Equipment Needs:  FWW  Equipment Owned:  SPC, wheelchair    SUBJECTIVE:    Pt lives with god-sister in a 3 story home with 4 stair(s) to enter and 0 rail(s). Bed is on 1st floor and bath is on 2nd floor. There is a full flight of stairs with 1 rail to 2nd floor. Pt ambulated short distances with SPC PTA; used wheelchair for long distance mobility. OBJECTIVE:   Initial Evaluation  Date: 2022 Treatment  2022 Short Term/ Long Term   Goals   AM-PAC 6 Clicks 50/52 61/66    Was pt agreeable to Eval/treatment? Yes Yes    Does pt have pain? 7/10 head, neck /10 neck, LUE    Bed Mobility  Rolling: Bob  Supine to sit: ModA  Sit to supine: ModA  Scooting: ModA (seated) Rolling: Bob  Supine to sit: Bob  Sit to supine: NT  Scooting: Bob (seated) Rolling: Independent  Supine to sit: Independent  Sit to supine: Independent  Scooting: Independent   Transfers Sit to stand: ModA  Stand to sit: ModA  Stand pivot: NT Sit to stand: Bob (bed);  ModA (chair)  Stand to sit: ModA (chair)  Stand pivot: ModA with [employer] Sit to stand: Heather  Stand to sit: Heather  Stand pivot: Heather with AAD   Ambulation    5 feet with [employer] ModA 3', 8' with [employer] + chair follow ModA 75 feet with AAD Heather   Stair negotiation: ascended and descended  NT NT 12 step(s) with 1 rail(s) Heather Wheelchair mobility NT  feet Independent   ROM BUE:  See OT note  BLE:  WFL     Strength BUE:  See OT note  RLE:  Grossly 5/5  LLE:  Grossly 3+/5     Balance Sitting EOB:  Bob  Dynamic Standing:  ModA with [facility] [place] Sitting EOB:  Bob  Dynamic Standing:  ModA with [facility] [place] Sitting EOB:  Independent  Dynamic Standing:  Heather with AAD     Pt is A & O x 4  Sensation:  No reports of numbness/tingling to extremities  Edema:  Unremarkable    Vitals:   HR 77, SpO2 94% at rest.  HR 82-90, SpO2 92-93% with activity. Therapeutic Exercises:  - Seated BLE LAQ (1 x 5)  - Seated RLE ankle plantarflexion/dorsiflexion (1 x 10)  - Seated LLE ankle plantarflexion/dorsiflexion AAROM (1 x 10)    Patient education  Pt educated on spinal precautions, safety during functional mobility, use of call light for assistance. Patient response to education:   Pt verbalized understanding Pt demonstrated skill Pt requires further education in this area   Yes Yes Yes     ASSESSMENT:    Comments:  Patient in semi-Peter's position with cervical collar donned upon arrival; agreeable to PT session with OT collaboration. Required increased time to perform bed mobility via log roll technique. Patient sat EOB for extended period of time. Required increased time, verbal cues related to positioning/sequencing to ensure safety during functional transfers. Exhibited flexed forward standing posture, requiring verbal cues to correct. Upon initial stand, static standing performed for ~1 minute using [facility] [place] for BUE support while hygiene was performed as patient noted to be incontinent of urine. Ambulated from bed to bedside chair. Following seated rest break, performed ambulation bout of greater distance. Ambulated with decreased speed and unsteadiness. Activity limited by fatigue. Reported dizziness during activity that resolved. Vitals monitored and noted. Patient left sitting in bedside chair with cervical collar donned, call light in reach.  Instructed not to get up on own and to use call light for assistance; verbalized understanding. Treatment:  Patient practiced and was instructed in the following treatment:    Bed mobility - verbal cues to facilitate proper positioning and sequencing regarding log roll technique; physical assistance provided as needed during activity  Static sitting - performed to promote upright tolerance and balance maintenance  Functional transfers - verbal cues to facilitate proper positioning and sequencing, particularly related to hand/foot placement; physical assistance provided as needed during activity  Static standing - performed to promote activity tolerance and balance maintenance  Ambulation - verbal cues to facilitate proper positioning; physical assistance provided as needed during activity  Therapeutic exercises - performed to maintain/improve strength and endurance    PLAN:    Patient is making good progress towards established goals. Will continue with current POC.       Time in  1445  Time out  1510    Total Treatment Time  25 minutes     CPT codes:  [] Gait training 96888 0 minutes  [] Manual therapy 05641 0 minutes  [x] Therapeutic activities 60876 25 minutes  [] Therapeutic exercises 19529 0 minutes  [] Neuromuscular reeducation 68306 0 minutes    Adrienne Ramos PT, DPT  [id number]

## 2022-07-20 NOTE — PROGRESS NOTES
Hospitalist Progress Note      SYNOPSIS: Patient admitted on 2022 for back pain. Mr. Jarred Evans, a 75y.o. 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 No perioperative complications. SUBJECTIVE:    Patient seen and examined in his room. Denies any chest pain, nausea, vomiting. Records reviewed. Stable overnight. No other overnight issues reported. Temp (24hrs), Av.1 °F (37.3 °C), Min:98.4 °F (36.9 °C), Max:99.7 °F (37.6 °C)    DIET: Diet NPO Exceptions are: Sips of Water with Meds  CODE: Prior    Intake/Output Summary (Last 24 hours) at 2022 0824  Last data filed at 2022 0444  Gross per 24 hour   Intake --   Output 1860 ml   Net -1860 ml       OBJECTIVE:    BP (!) 153/85   Pulse 77   Temp 99.5 °F (37.5 °C) (Oral)   Resp 16   Ht 5' 9\" (1.753 m)   Wt 128 lb (58.1 kg)   SpO2 100%   BMI 18.90 kg/m²     General appearance: No apparent distress, appears stated age and cooperative. HEENT:  Conjunctivae/corneas clear. Neck: Supple. No jugular venous distention. Respiratory: Clear to auscultation bilaterally, normal respiratory effort  Cardiovascular: Regular rate rhythm, normal S1-S2  Abdomen: Soft, nontender, nondistended  Musculoskeletal: No clubbing, cyanosis, no bilateral lower extremity edema. Brisk capillary refill.    Skin:  No rashes  on visible skin  Neurologic: awake, alert and following commands     ASSESSMENT & PLAN:    ASSESSMENT:  ANTERIOR C3-C4, C4-C5,  AND C5-C6 CERVICAL DISCECTOMY AND FUSION   POSTERIOR C3-C7 LAMINECTOMY, POSTERIOR C3-T1 FUSION   PHARYNGEAL TEAR REPAIR   Appears medically stable  Hfx of prost ca  Hx of htn well conrolled  on lsinop and amlodip at home     PLAN:  Once able to tolerate oral safely   Resume amlodipine and lisinopril      DISPOSITION:   Medically stable for discharge on 7/20. IV fluids discontinued on the morning of 7/20. Medications:  REVIEWED DAILY    Infusion Medications    sodium chloride      sodium chloride 0  (07/18/22 1235)    sodium chloride      sodium chloride 50 mL/hr at 07/20/22 0208     Scheduled Medications    sodium chloride flush  5-40 mL IntraVENous 2 times per day    acetaminophen  650 mg Oral Q6H    famotidine  20 mg Oral BID    Or    famotidine (PEPCID) injection  20 mg IntraVENous BID    ampicillin-sulbactam  3,000 mg IntraVENous Q6H     PRN Meds: sodium chloride, sodium chloride flush, sodium chloride, ondansetron **OR** ondansetron, morphine **OR** morphine, diphenhydrAMINE **OR** diphenhydrAMINE, bisacodyl, fleet    Labs:     Recent Labs     07/19/22  0539 07/20/22  0617   WBC 12.5* 12.7*   HGB 11.1* 10.4*   HCT 33.7* 31.2*    194       Recent Labs     07/19/22  0539 07/20/22  0617    143   K 4.0 3.8   * 109*   CO2 23 25   BUN 20 15   CREATININE 1.0 0.8   CALCIUM 7.8* 8.1*       Recent Labs     07/20/22  0617   PROT 5.8*   ALKPHOS 53   ALT 20   AST 41*   BILITOT 1.7*       No results for input(s): INR in the last 72 hours. No results for input(s): Nura Dominique in the last 72 hours. Chronic labs:    Lab Results   Component Value Date    INR 1.1 07/13/2022       Radiology: REVIEWED DAILY    +++++++++++++++++++++++++++++++++++++++++++++++++  Julian Vega MD  [facility] Emergency Department Physician - [address] [place] [address]  +++++++++++++++++++++++++++++++++++++++++++++++++  NOTE: This report was transcribed using voice recognition software. Every effort was made to ensure accuracy; however, inadvertent computerized transcription errors may be present.

## 2022-07-20 NOTE — PROGRESS NOTES
HCT 31.2 (L) 07/20/2022    MCV 84.1 07/20/2022     07/20/2022     Lab Results   Component Value Date    NEUTROABS 10.91 (H) 07/20/2022    NEUTROABS 2.44 07/13/2022    NEUTROABS 2.89 11/10/2021     No results found for: CRPHS  Lab Results   Component Value Date    ALT 20 07/20/2022    AST 41 (H) 07/20/2022    ALKPHOS 53 07/20/2022    BILITOT 1.7 (H) 07/20/2022     Lab Results   Component Value Date/Time     07/20/2022 06:17 AM    K 3.8 07/20/2022 06:17 AM    K 4.0 07/13/2022 09:45 AM     07/20/2022 06:17 AM    CO2 25 07/20/2022 06:17 AM    BUN 15 07/20/2022 06:17 AM    CREATININE 0.8 07/20/2022 06:17 AM    CREATININE 1.0 07/19/2022 05:39 AM    CREATININE 1.0 07/13/2022 09:45 AM    GFRAA >60 07/20/2022 06:17 AM    LABGLOM >60 07/20/2022 06:17 AM    GLUCOSE 106 07/20/2022 06:17 AM    PROT 5.8 07/20/2022 06:17 AM    LABALBU 3.2 07/20/2022 06:17 AM    CALCIUM 8.1 07/20/2022 06:17 AM    BILITOT 1.7 07/20/2022 06:17 AM    ALKPHOS 53 07/20/2022 06:17 AM    AST 41 07/20/2022 06:17 AM    ALT 20 07/20/2022 06:17 AM     No results found for: CRP  No results found for: [address]  Radiology:      Microbiology:   No results found for: BC, ORG  No results found for: Donold Mooring, ORG  No results found for: WNDABS  No results found for: RESPSMEAR  No results found for: Pita Cook, LABLEGI, AFBCX, FUNGSM, LABFUNG  No results found for: CULTRESP  No results found for: CXCATHTIP  No results found for: BFCS  No results found for: CXSURG  Urine Culture, Routine   Date Value Ref Range Status   07/18/2022 Growth not present  Final     MRSA Culture Only   Date Value Ref Range Status   07/13/2022 Methicillin resistant Staph aureus not isolated  Final       ASSESSMENT:  Status post cervical fusion with a pharyngeal/esophageal tear    PLAN:  Continue Unasyn day 2/10 days  Check final cultures  Monitor labs    Whit Helms MD  3:13 PM  7/20/2022

## 2022-07-20 NOTE — PROGRESS NOTES
OTOLARYNGOLOGY  DAILY PROGRESS NOTE  2022    Subjective:     No acute events overnight. Objective:     BP (!) 143/82   Pulse 89   Temp 98.8 °F (37.1 °C) (Temporal)   Resp 16   Ht 5' 9\" (1.753 m)   Wt 128 lb (58.1 kg)   SpO2 100%   BMI 18.90 kg/m²   PULSE OXIMETRY RANGE: SpO2  Av.6 %  Min: 99 %  Max: 100 %  I/O last 3 completed shifts: In: 2675 [I.V.:2675]  Out: 0177 [Urine:1150; Drains:60; Blood:400]    GENERAL:  Awake, alert, cooperative, no apparent distress  HENT: C-collar in place. Incision site covered with steri strips  EYES: No sclera icterus, pupils equal  LUNGS:  No increased work of breathing, no stridor  CARDIOVASCULAR:  RR      Assessment/Plan:     75 y.o. male s/p ACDF with Dr. Ana Luisa Rowell, intraoperative repair of pharyngeal tear by Dr. Desmond Verma on     Pt doing well, notes raspy voice but otherwise stable. Plan for swallow study/esophagram today. Will continue to follow.     Electronically signed by Rosanna Morales DO on 2022 at 6:54 AM

## 2022-07-20 NOTE — PROGRESS NOTES
Occupational Therapy  OT BEDSIDE TREATMENT NOTE   [address] [facility] [address] [facility]  [address] [place] [address]        Date:2022  Patient Name: Joshua Guardado  MRN: [id number]  : 1955  Room: [address] [place] [address]     Per OT Eval:    Evaluating OT:Tammie Claudio, OTR/L  License #  [id number]       Referring Provider: Tiburcio Morales MD     Specific Provider Orders/Date: OT evaluation & treatment       Diagnosis: C3-C4, C4-C5, C5-C6 HERNIATED DISC  C3-C7 STENOSIS     Pertinent Medical History:  has a past medical history of Cancer ([facility] [address] Surgery: 22: ANTERIOR C3-C4, C4-C5,  AND C5-C6 CERVICAL DISCECTOMY AND FUSION  POSTERIOR C3-C7 LAMINECTOMY, POSTERIOR C3-T1 FUSION  PHARYNGEAL TEAR REPAIR    Past Surgical History:  has a past surgical history that includes Skin graft; Hemorrhoid surgery; cervical fusion (N/A, 2022); cervical fusion (N/A, 2022); and Throat surgery (N/A, 2022). Precautions:  Fall Risk, custom collar, NPO, suction, cervical precautions      Assessment of current deficits     [x] Functional mobility            [x]ADLs           [x] Strength                  [x]Cognition   [x] Functional transfers          [x] IADLs         [x] Safety Awareness   [x]Endurance   [x] Fine Coordination                         [x] Balance      [] Vision/perception   [x]Sensation      []Gross Motor Coordination             [] ROM           [] Delirium                   [] Motor Control     OT PLAN OF CARE   OT POC based on physician orders, patient diagnosis and results of clinical assessment     Frequency/Duration: 2-4 days/wk for 2 weeks PRN  Specific OT Treatment Interventions to include:    Instruction/training on adapted ADL techniques and AE recommendations to increase functional independence within precautions  Training on energy conservation strategies, correct breathing pattern and techniques to improve independence/tolerance for self-care routine  Functional transfer/mobility training/DME recommendations for increased independence, safety, and fall prevention  Patient/Family education to increase follow through with safety techniques and functional independence  Recommendation of environmental modifications for increased safety with functional transfers/mobility and ADLs  Splinting/positioning for increased function, prevention of contractures, and improve skin integrity  Therapeutic exercise to improve motor endurance, ROM, and functional strength for ADLs/functional transfers  Therapeutic activities to facilitate/challenge dynamic balance, stand tolerance for increased safety and independence with ADLs  Therapeutic activities to facilitate gross/fine motor skills for increased independence with ADLs  Positioning to improve skin integrity, interaction with environment and functional independence     Recommended Adaptive Equipment:  TBD in Rehab     Home Living: Pt lives with god-sister in a 3 story with 4 steps to enter with no HR. Bed on 1st, Bath on 2nd level.   Bathroom setup: tub/shower, low toilet   Equipment owned: spc, w/c     Prior Level of Function: Pt. States recently had assist with ADLs & with IADLs; ambulated spc short distances, w/c longer distances  Driving: relies on family  Occupation: retired, tree cutting     Pain Level: Pt complained of 8/10 neck pain this session  Cognition: A&O: 4/4; Follows 2 step directions              Memory:  F+              Sequencing:  F              Problem solving:  F              Judgement/safety:  F                Functional Assessment:  AM-PAC Daily Activity Raw Score: 12/24    Initial Eval Status  Date: 7-19-22 Treatment Status  Date: 7/20/22 STGs = LTGs  Time frame: 10-14 days   Feeding Min A with suction tube to mouth using R hand  NPO Set up   Grooming Max A with oral care Ginny  Pt washed face, applied deodorant, and cleaned mouth with oral swab, spitting into basin while seated upright in chair Stand by Assist     [facility] EOB  Stand by Assist    LB Dressing Dep  Dependent  [facility] socks Minimal Assist    Bathing Dep  maxA  Simulated Task    Pt able to wash of UB, assistance to wash of LB, buttocks and lavon area Minimal Assist    Toileting Dep  Dependent  Pt attempting to use of urinal upon arrival, with incontinence of urine on bed sheets Minimal Assist    Bed Mobility  Logroll: Min A  Supine to sit: Mod A  Sit to supine: Mod A  Ginny  Supine<>EOB Supine to sit: Supervision   Sit to supine: Supervision    Functional Transfers Mod A with sit <> stand, use of ww  modA  Sit to Stand  Stand to Sit    Stand Pivot Transfer EOB<>Chair with w.w. Stand by Assist    Functional Mobility Mod A with few side steps towards HOB with ww  modA  Pt ambulated short household distance in room with w.w. and chair follow  Stand by Assist    Balance Sitting:    Static:  Min A/SBA    Dynamic:Min A  Standing: Mod A  Sitting EOB:  CGA    Standing:  modA     Activity Tolerance P+/F- Fair-  F+   Visual/  Perceptual Glasses: yes          Vitals spO2 & HR   WFL      Hand Dominance R    AROM (PROM) Strength Additional Info:   RUE  R shld. to 70  Elbow WFL  Wrist 50%  Hand WFL Grossly 3+/5 F+  and Fair FMC/dexterity noted during ADL tasks      LUE L shld. Minimal d/t chronic RC injury  L elbow 30-90  L wrist minimal  L hand, functional , only 25% ext. Grossly 2+/3- F-  and Poor FMC/dexterity noted during ADL tasks. Noted L swan neck deformities         Hearing: WFL  Sensation:  No c/o numbness or tingling B hands  Tone: WFL  Edema: none noted    Education:  Pt was educated on role of OT, goals to be reached, importance of OOB activity, precautions to follow, safety and hand placement with transfers, safety and walker management with functional mobility, and techniques to assist with UB dressing task.      Comments: Upon arrival pt supine in bed, agreeable to therapy, nursing okaying pt to be seen this session. At end of session, pt seated upright in chair, all lines and tubes intact, call light within reach. Pt has made fair progress towards set goals.    Continue with current plan of care focusing on increasing of independency with transfers and ADL tasks      Treatment Time In: 2:45pm           Treatment Time Out: 3:10pm                Treatment Charges: Mins Units   Ther Ex  73939     Manual Therapy 52940     Thera Activities 76835 17 1   ADL/Home Mgt 56900 8 1   Neuro Re-ed 97079     Group Therapy      Orthotic manage/training  16700     Non-Billable Time     Total Timed Treatment 25 2        [place] OS[place]ORNE/L [id number]

## 2022-07-20 NOTE — CARE COORDINATION
7/20/22 Update CM note: POD 2 of Cervical fusions with pharyngeal tear. He remains NPO. He is scheduled for an esophagram/swallow eval today. He remains on iv unasyn q6 and iv pepcid bid. PT 11/OT 9. He is being followed by ARU. Drain remains intact. Collar remains on. He is able to be up as tolerated. He is hoping for ARU to help with his rehab strengthening. Will continue to follow for further plan.  Electronically signed by Anamaria Mccann RN CM on 7/20/2022 at 12:47 PM

## 2022-07-20 NOTE — PRE-CERTIFICATION NOTE
Message left with the [place] transfer center to check eligibility for our acute rehab: is he service connected, if so what percentage and will he need to transfer to the [place] for rehab or can he stay here.

## 2022-07-20 NOTE — PROGRESS NOTES
Department of Neurosurgery  Progress Note    CHIEF COMPLAINT: s/p anterior/posterior cervical fusion    SUBJECTIVE:  c/o sore throat and op site pain    REVIEW OF SYSTEMS :  Constitutional: Negative for chills and fever. Neurological: Negative for dizziness, tremors and speech change.      OBJECTIVE:   VITALS:  BP (!) 143/82   Pulse 89   Temp 98.8 °F (37.1 °C) (Temporal)   Resp 16   Ht 5' 9\" (1.753 m)   Wt 128 lb (58.1 kg)   SpO2 100%   BMI 18.90 kg/m²   PHYSICAL:  CONSTITUTIONAL:  awake, alert, cooperative, no apparent distress, and appears stated age    DATA:  CBC:   Lab Results   Component Value Date/Time    WBC 12.5 07/19/2022 05:39 AM    RBC 4.06 07/19/2022 05:39 AM    HGB 11.1 07/19/2022 05:39 AM    HCT 33.7 07/19/2022 05:39 AM    MCV 83.0 07/19/2022 05:39 AM    MCH 27.3 07/19/2022 05:39 AM    MCHC 32.9 07/19/2022 05:39 AM    RDW 16.6 07/19/2022 05:39 AM     07/19/2022 05:39 AM    MPV 9.6 07/19/2022 05:39 AM     BMP:    Lab Results   Component Value Date/Time     07/19/2022 05:39 AM    K 4.0 07/19/2022 05:39 AM    K 4.0 07/13/2022 09:45 AM     07/19/2022 05:39 AM    CO2 23 07/19/2022 05:39 AM    BUN 20 07/19/2022 05:39 AM    LABALBU 4.1 11/10/2021 02:47 PM    CREATININE 1.0 07/19/2022 05:39 AM    CALCIUM 7.8 07/19/2022 05:39 AM    GFRAA >60 07/19/2022 05:39 AM    LABGLOM >60 07/19/2022 05:39 AM    GLUCOSE 117 07/19/2022 05:39 AM     PT/INR:    Lab Results   Component Value Date/Time    PROTIME 11.7 07/13/2022 09:45 AM    INR 1.1 07/13/2022 09:45 AM     PTT:    Lab Results   Component Value Date/Time    APTT 30.0 09/09/2020 01:35 PM   [APTT}    Current Inpatient Medications  Current Facility-Administered Medications: 0.9 % sodium chloride infusion, , IntraVENous, PRN  0.9 % sodium chloride infusion, , IntraVENous, Continuous  sodium chloride flush 0.9 % injection 5-40 mL, 5-40 mL, IntraVENous, 2 times per day  sodium chloride flush 0.9 % injection 5-40 mL, 5-40 mL, IntraVENous, PRN  0.9 % sodium chloride infusion, , IntraVENous, PRN  acetaminophen (TYLENOL) tablet 650 mg, 650 mg, Oral, Q6H  ondansetron (ZOFRAN-ODT) disintegrating tablet 4 mg, 4 mg, Oral, Q8H PRN **OR** ondansetron (ZOFRAN) injection 4 mg, 4 mg, IntraVENous, Q6H PRN  0.9 % sodium chloride infusion, , IntraVENous, Continuous  morphine (PF) injection 2 mg, 2 mg, IntraVENous, Q2H PRN **OR** morphine sulfate (PF) injection 4 mg, 4 mg, IntraVENous, Q2H PRN  diphenhydrAMINE (BENADRYL) tablet 25 mg, 25 mg, Oral, Q6H PRN **OR** diphenhydrAMINE (BENADRYL) injection 25 mg, 25 mg, IntraVENous, Q6H PRN  bisacodyl (DULCOLAX) suppository 10 mg, 10 mg, Rectal, Daily PRN  fleet rectal enema 1 enema, 1 enema, Rectal, Daily PRN  famotidine (PEPCID) tablet 20 mg, 20 mg, Oral, BID **OR** famotidine (PEPCID) 20 mg in sodium chloride (PF) 10 mL injection, 20 mg, IntraVENous, BID  ampicillin-sulbactam (UNASYN) 3000 mg in 100 mL NS IVPB minibag, 3,000 mg, IntraVENous, Q6H    ASSESSMENT:   S/p C3-6 ACDF and C3-T1 PCF on 7/18 - stable  Pharyngeal perforation    PLAN:  PT/OT  WBAT with cervical collar  Pain control  NPO  ENT following  AB per ID      Electronically signed by RUBEN Abbasi on 7/20/2022 at 7:32 AM s/p anterior

## 2022-07-20 NOTE — PROGRESS NOTES
Patient is for an esophagram today. PMR continues to follow patient's progress.     Alma Delia Gan RN, Pre-screener for Acute Rehab  P: [phone]

## 2022-07-20 NOTE — PROGRESS NOTES
[facility], [address] regarding adding something to the [facility] for his excretions and swelling. During the Esophogram he aspirated some of the contrast.   Delayed oral phase swallowing mechanism and followed by prompt aspiration of   Gastrografin contrast.  The current exam is nondiagnostic for evaluation of   the pharynx. The pharynx and esophagus were not opacified with contrast.     He is unable to take anything PO so that took out the Guaifenesin, he will check into Decadron and will add fluids back on for him as we have to keep him NPO.

## 2022-07-20 NOTE — PLAN OF CARE
Problem: Discharge Planning  Goal: Discharge to home or other facility with appropriate resources  7/20/2022 1944 by Krishan Cazares RN  Outcome: Progressing  7/20/2022 1933 by Krishan Cazares RN  Outcome: Progressing     Problem: Pain  Goal: Verbalizes/displays adequate comfort level or baseline comfort level  Outcome: Completed     Problem: Safety - Adult  Goal: Free from fall injury  Outcome: Completed     Problem: Skin/Tissue Integrity  Goal: Absence of new skin breakdown  Description: 1. Monitor for areas of redness and/or skin breakdown  2. Assess vascular access sites hourly  3. Every 4-6 hours minimum:  Change oxygen saturation probe site  4. Every 4-6 hours:  If on nasal continuous positive airway pressure, respiratory therapy assess nares and determine need for appliance change or resting period.   Outcome: Completed     Problem: ABCDS Injury Assessment  Goal: Absence of physical injury  Outcome: Completed

## 2022-07-21 ENCOUNTER — APPOINTMENT (OUTPATIENT)
Dept: GENERAL RADIOLOGY | Age: 67
DRG: 453 | End: 2022-07-21
Attending: NEUROLOGICAL SURGERY
Payer: OTHER GOVERNMENT

## 2022-07-21 LAB
ALBUMIN SERPL-MCNC: 3.3 G/DL (ref 3.5–5.2)
ALP BLD-CCNC: 56 U/L (ref 40–129)
ALT SERPL-CCNC: 23 U/L (ref 0–40)
ANION GAP SERPL CALCULATED.3IONS-SCNC: 12 MMOL/L (ref 7–16)
AST SERPL-CCNC: 34 U/L (ref 0–39)
BASOPHILS ABSOLUTE: 0.01 E9/L (ref 0–0.2)
BASOPHILS RELATIVE PERCENT: 0.1 % (ref 0–2)
BILIRUB SERPL-MCNC: 2.2 MG/DL (ref 0–1.2)
BUN BLDV-MCNC: 12 MG/DL (ref 6–23)
CALCIUM SERPL-MCNC: 8.4 MG/DL (ref 8.6–10.2)
CHLORIDE BLD-SCNC: 105 MMOL/L (ref 98–107)
CO2: 23 MMOL/L (ref 22–29)
CREAT SERPL-MCNC: 0.6 MG/DL (ref 0.7–1.2)
EOSINOPHILS ABSOLUTE: 0.03 E9/L (ref 0.05–0.5)
EOSINOPHILS RELATIVE PERCENT: 0.2 % (ref 0–6)
GFR AFRICAN AMERICAN: >60
GFR NON-AFRICAN AMERICAN: >60 ML/MIN/1.73
GLUCOSE BLD-MCNC: 98 MG/DL (ref 74–99)
HCT VFR BLD CALC: 33.9 % (ref 37–54)
HEMOGLOBIN: 11.4 G/DL (ref 12.5–16.5)
IMMATURE GRANULOCYTES #: 0.06 E9/L
IMMATURE GRANULOCYTES %: 0.5 % (ref 0–5)
LYMPHOCYTES ABSOLUTE: 0.89 E9/L (ref 1.5–4)
LYMPHOCYTES RELATIVE PERCENT: 7 % (ref 20–42)
MCH RBC QN AUTO: 27.5 PG (ref 26–35)
MCHC RBC AUTO-ENTMCNC: 33.6 % (ref 32–34.5)
MCV RBC AUTO: 81.9 FL (ref 80–99.9)
METER GLUCOSE: 90 MG/DL (ref 74–99)
METER GLUCOSE: 90 MG/DL (ref 74–99)
METER GLUCOSE: 95 MG/DL (ref 74–99)
METER GLUCOSE: 97 MG/DL (ref 74–99)
MONOCYTES ABSOLUTE: 1 E9/L (ref 0.1–0.95)
MONOCYTES RELATIVE PERCENT: 7.9 % (ref 2–12)
NEUTROPHILS ABSOLUTE: 10.74 E9/L (ref 1.8–7.3)
NEUTROPHILS RELATIVE PERCENT: 84.3 % (ref 43–80)
PDW BLD-RTO: 15.7 FL (ref 11.5–15)
PLATELET # BLD: 218 E9/L (ref 130–450)
PMV BLD AUTO: 9.6 FL (ref 7–12)
POTASSIUM SERPL-SCNC: 3.6 MMOL/L (ref 3.5–5)
RBC # BLD: 4.14 E12/L (ref 3.8–5.8)
SODIUM BLD-SCNC: 140 MMOL/L (ref 132–146)
TOTAL PROTEIN: 6.7 G/DL (ref 6.4–8.3)
WBC # BLD: 12.7 E9/L (ref 4.5–11.5)

## 2022-07-21 PROCEDURE — 2500000003 HC RX 250 WO HCPCS: Performed by: NEUROLOGICAL SURGERY

## 2022-07-21 PROCEDURE — 97535 SELF CARE MNGMENT TRAINING: CPT

## 2022-07-21 PROCEDURE — 6360000002 HC RX W HCPCS: Performed by: NEUROLOGICAL SURGERY

## 2022-07-21 PROCEDURE — 92611 MOTION FLUOROSCOPY/SWALLOW: CPT

## 2022-07-21 PROCEDURE — 1200000000 HC SEMI PRIVATE

## 2022-07-21 PROCEDURE — A4216 STERILE WATER/SALINE, 10 ML: HCPCS | Performed by: NEUROLOGICAL SURGERY

## 2022-07-21 PROCEDURE — 80053 COMPREHEN METABOLIC PANEL: CPT

## 2022-07-21 PROCEDURE — 92526 ORAL FUNCTION THERAPY: CPT

## 2022-07-21 PROCEDURE — 97530 THERAPEUTIC ACTIVITIES: CPT

## 2022-07-21 PROCEDURE — 82962 GLUCOSE BLOOD TEST: CPT

## 2022-07-21 PROCEDURE — 36415 COLL VENOUS BLD VENIPUNCTURE: CPT

## 2022-07-21 PROCEDURE — 71045 X-RAY EXAM CHEST 1 VIEW: CPT

## 2022-07-21 PROCEDURE — 2580000003 HC RX 258: Performed by: NEUROLOGICAL SURGERY

## 2022-07-21 PROCEDURE — 85025 COMPLETE CBC W/AUTO DIFF WBC: CPT

## 2022-07-21 PROCEDURE — 74230 X-RAY XM SWLNG FUNCJ C+: CPT

## 2022-07-21 RX ADMIN — FAMOTIDINE 20 MG: 10 INJECTION, SOLUTION INTRAVENOUS at 09:00

## 2022-07-21 RX ADMIN — MORPHINE SULFATE 4 MG: 4 INJECTION, SOLUTION INTRAMUSCULAR; INTRAVENOUS at 12:07

## 2022-07-21 RX ADMIN — AMPICILLIN SODIUM AND SULBACTAM SODIUM 3000 MG: 2; 1 INJECTION, POWDER, FOR SOLUTION INTRAMUSCULAR; INTRAVENOUS at 04:47

## 2022-07-21 RX ADMIN — AMPICILLIN SODIUM AND SULBACTAM SODIUM 3000 MG: 2; 1 INJECTION, POWDER, FOR SOLUTION INTRAMUSCULAR; INTRAVENOUS at 09:46

## 2022-07-21 RX ADMIN — AMPICILLIN SODIUM AND SULBACTAM SODIUM 3000 MG: 2; 1 INJECTION, POWDER, FOR SOLUTION INTRAMUSCULAR; INTRAVENOUS at 16:17

## 2022-07-21 RX ADMIN — Medication 10 ML: at 09:00

## 2022-07-21 RX ADMIN — AMPICILLIN SODIUM AND SULBACTAM SODIUM 3000 MG: 2; 1 INJECTION, POWDER, FOR SOLUTION INTRAMUSCULAR; INTRAVENOUS at 20:55

## 2022-07-21 RX ADMIN — MORPHINE SULFATE 2 MG: 2 INJECTION, SOLUTION INTRAMUSCULAR; INTRAVENOUS at 20:53

## 2022-07-21 RX ADMIN — FAMOTIDINE 20 MG: 10 INJECTION, SOLUTION INTRAVENOUS at 20:53

## 2022-07-21 ASSESSMENT — ENCOUNTER SYMPTOMS
VOMITING: 0
CONSTIPATION: 0
DIARRHEA: 0
FACIAL SWELLING: 0
TROUBLE SWALLOWING: 0
COLOR CHANGE: 0
ABDOMINAL PAIN: 0
NAUSEA: 0
ABDOMINAL DISTENTION: 0
SHORTNESS OF BREATH: 0
COUGH: 0

## 2022-07-21 ASSESSMENT — PAIN DESCRIPTION - FREQUENCY: FREQUENCY: CONTINUOUS

## 2022-07-21 ASSESSMENT — PAIN DESCRIPTION - LOCATION
LOCATION: NECK
LOCATION: NECK;ARM

## 2022-07-21 ASSESSMENT — PAIN DESCRIPTION - ORIENTATION: ORIENTATION: LOWER

## 2022-07-21 ASSESSMENT — PAIN DESCRIPTION - DESCRIPTORS
DESCRIPTORS: ACHING;TENDER;SORE
DESCRIPTORS: DISCOMFORT;ACHING;SORE

## 2022-07-21 ASSESSMENT — PAIN - FUNCTIONAL ASSESSMENT: PAIN_FUNCTIONAL_ASSESSMENT: ACTIVITIES ARE NOT PREVENTED

## 2022-07-21 ASSESSMENT — PAIN DESCRIPTION - ONSET: ONSET: ON-GOING

## 2022-07-21 ASSESSMENT — PAIN SCALES - GENERAL
PAINLEVEL_OUTOF10: 8
PAINLEVEL_OUTOF10: 6

## 2022-07-21 ASSESSMENT — PAIN DESCRIPTION - PAIN TYPE
TYPE: SURGICAL PAIN
TYPE: SURGICAL PAIN

## 2022-07-21 NOTE — CARE COORDINATION
7/21/22 Update CM Note: Spoke with Sergio Nathan, Care Coordinator for Neuro/Neurospinal at the [facility] who states patients ARU admission would be covered under the approval/SCOC which approved the procedure and any postop complications. He states it is good until November 16th 2022. He states a new ARU authorization in not needed. His return call number is [phone] ext [phone]. The above information was given to [facility] for [facility], for follow up.  Electronically signed by Vincent Alexis RN CM on 7/21/2022 at 10:38 AM

## 2022-07-21 NOTE — PROGRESS NOTES
Hospitalist Progress Note      SYNOPSIS: Patient admitted on 2022 for back pain. Mr. Yeyo Cordova, a 75y.o. 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 Possible pharyngeal/esophageal tear and patient was started on Unasyn. SUBJECTIVE:    Patient seen and examined in his room. Denies any chest pain, nausea, vomiting. Records reviewed. Stable overnight. No other overnight issues reported. Temp (24hrs), Av.2 °F (36.8 °C), Min:97.6 °F (36.4 °C), Max:99.1 °F (37.3 °C)    DIET: Diet NPO Exceptions are: Sips of Water with Meds  CODE: Prior    Intake/Output Summary (Last 24 hours) at 2022 0905  Last data filed at 2022 0617  Gross per 24 hour   Intake 0 ml   Output 1525 ml   Net -1525 ml       OBJECTIVE:    BP (!) 145/88   Pulse 74   Temp 97.6 °F (36.4 °C) (Temporal)   Resp 18   Ht 5' 9\" (1.753 m)   Wt 128 lb (58.1 kg)   SpO2 99%   BMI 18.90 kg/m²     General appearance: No apparent distress, appears stated age and cooperative. HEENT:  Conjunctivae/corneas clear. Neck: Supple. No jugular venous distention. Respiratory: Clear to auscultation bilaterally, normal respiratory effort  Cardiovascular: Regular rate rhythm, normal S1-S2  Abdomen: Soft, nontender, nondistended  Musculoskeletal: No clubbing, cyanosis, no bilateral lower extremity edema. Brisk capillary refill.    Skin:  No rashes  on visible skin  Neurologic: awake, alert and following commands     ASSESSMENT & PLAN:    ASSESSMENT:  ANTERIOR C3-C4, C4-C5,  AND C5-C6 CERVICAL DISCECTOMY AND FUSION   POSTERIOR C3-C7 LAMINECTOMY, POSTERIOR C3-T1 FUSION   PHARYNGEAL TEAR REPAIR   Appears medically stable  Hfx of prost ca  Hx of htn well conrolled  on lsinop and amlodip at home  Pharyngeal/esophageal tear     PLAN:  Once able to tolerate oral safely   Resume amlodipine and lisinopril  Continue Unasyn as per ID, started on 7/18      DISPOSITION:   Medically stable for discharge on 7/20. IV fluids discontinued on the morning of 7/20. Medications:  REVIEWED DAILY    Infusion Medications    sodium chloride 100 mL/hr at 07/20/22 3424    dextrose      sodium chloride      sodium chloride       Scheduled Medications    acetaminophen  650 mg Oral Q6H    sodium chloride flush  5-40 mL IntraVENous 2 times per day    famotidine  20 mg Oral BID    Or    famotidine (PEPCID) injection  20 mg IntraVENous BID    ampicillin-sulbactam  3,000 mg IntraVENous Q6H     PRN Meds: glucose, dextrose bolus **OR** dextrose bolus, glucagon (rDNA), dextrose, sodium chloride, sodium chloride flush, sodium chloride, ondansetron **OR** ondansetron, morphine **OR** morphine, diphenhydrAMINE **OR** diphenhydrAMINE, bisacodyl, fleet    Labs:     Recent Labs     07/19/22  0539 07/20/22  0617 07/21/22  0716   WBC 12.5* 12.7* 12.7*   HGB 11.1* 10.4* 11.4*   HCT 33.7* 31.2* 33.9*    194 218       Recent Labs     07/19/22  0539 07/20/22  0617 07/21/22  0716    143 140   K 4.0 3.8 3.6   * 109* 105   CO2 23 25 23   BUN 20 15 12   CREATININE 1.0 0.8 0.6*   CALCIUM 7.8* 8.1* 8.4*       Recent Labs     07/20/22  0617 07/21/22  0716   PROT 5.8* 6.7   ALKPHOS 53 56   ALT 20 23   AST 41* 34   BILITOT 1.7* 2.2*       No results for input(s): INR in the last 72 hours. No results for input(s): Maryanne Fuchs in the last 72 hours. Chronic labs:    Lab Results   Component Value Date    INR 1.1 07/13/2022       Radiology: REVIEWED DAILY    +++++++++++++++++++++++++++++++++++++++++++++++++  Betty Lincoln MD  [facility]ician - [address] [place], [place]  +++++++++++++++++++++++++++++++++++++++++++++++++  NOTE: This report was transcribed using voice recognition software.  Every effort was made to ensure accuracy; however, inadvertent computerized transcription errors may be present.

## 2022-07-21 NOTE — PROGRESS NOTES
Department of Neurosurgery  Progress Note    CHIEF COMPLAINT: s/p anterior/posterior cervical fusion    SUBJECTIVE:  continued sore throat, raspy voice, and op site pain. Plan for video swallow today     REVIEW OF SYSTEMS :  Constitutional: Negative for chills and fever. Neurological: Negative for dizziness, tremors and speech change.      OBJECTIVE:   VITALS:  BP (!) 156/89   Pulse 80   Temp 98 °F (36.7 °C) (Temporal)   Resp 18   Ht 5' 9\" (1.753 m)   Wt 128 lb (58.1 kg)   SpO2 100%   BMI 18.90 kg/m²   PHYSICAL:  CONSTITUTIONAL:  awake, alert, cooperative, no apparent distress, and appears stated age    DATA:  CBC:   Lab Results   Component Value Date/Time    WBC 12.7 07/21/2022 07:16 AM    RBC 4.14 07/21/2022 07:16 AM    HGB 11.4 07/21/2022 07:16 AM    HCT 33.9 07/21/2022 07:16 AM    MCV 81.9 07/21/2022 07:16 AM    MCH 27.5 07/21/2022 07:16 AM    MCHC 33.6 07/21/2022 07:16 AM    RDW 15.7 07/21/2022 07:16 AM     07/21/2022 07:16 AM    MPV 9.6 07/21/2022 07:16 AM     BMP:    Lab Results   Component Value Date/Time     07/21/2022 07:16 AM    K 3.6 07/21/2022 07:16 AM    K 4.0 07/13/2022 09:45 AM     07/21/2022 07:16 AM    CO2 23 07/21/2022 07:16 AM    BUN 12 07/21/2022 07:16 AM    LABALBU 3.3 07/21/2022 07:16 AM    CREATININE 0.6 07/21/2022 07:16 AM    CALCIUM 8.4 07/21/2022 07:16 AM    GFRAA >60 07/21/2022 07:16 AM    LABGLOM >60 07/21/2022 07:16 AM    GLUCOSE 98 07/21/2022 07:16 AM     PT/INR:    Lab Results   Component Value Date/Time    PROTIME 11.7 07/13/2022 09:45 AM    INR 1.1 07/13/2022 09:45 AM     PTT:    Lab Results   Component Value Date/Time    APTT 30.0 09/09/2020 01:35 PM   [APTT}    Current Inpatient Medications  Current Facility-Administered Medications: 0.9 % sodium chloride infusion, , IntraVENous, Continuous  glucose chewable tablet 16 g, 4 tablet, Oral, PRN  dextrose bolus 10% 125 mL, 125 mL, IntraVENous, PRN **OR** dextrose bolus 10% 250 mL, 250 mL, IntraVENous, PRN  glucagon (rDNA) injection 1 mg, 1 mg, SubCUTAneous, PRN  dextrose 10 % infusion, , IntraVENous, Continuous PRN  0.9 % sodium chloride infusion, , IntraVENous, PRN  sodium chloride flush 0.9 % injection 5-40 mL, 5-40 mL, IntraVENous, 2 times per day  sodium chloride flush 0.9 % injection 5-40 mL, 5-40 mL, IntraVENous, PRN  0.9 % sodium chloride infusion, , IntraVENous, PRN  ondansetron (ZOFRAN-ODT) disintegrating tablet 4 mg, 4 mg, Oral, Q8H PRN **OR** ondansetron (ZOFRAN) injection 4 mg, 4 mg, IntraVENous, Q6H PRN  morphine (PF) injection 2 mg, 2 mg, IntraVENous, Q2H PRN **OR** morphine sulfate (PF) injection 4 mg, 4 mg, IntraVENous, Q2H PRN  diphenhydrAMINE (BENADRYL) tablet 25 mg, 25 mg, Oral, Q6H PRN **OR** diphenhydrAMINE (BENADRYL) injection 25 mg, 25 mg, IntraVENous, Q6H PRN  bisacodyl (DULCOLAX) suppository 10 mg, 10 mg, Rectal, Daily PRN  fleet rectal enema 1 enema, 1 enema, Rectal, Daily PRN  famotidine (PEPCID) tablet 20 mg, 20 mg, Oral, BID **OR** famotidine (PEPCID) 20 mg in sodium chloride (PF) 10 mL injection, 20 mg, IntraVENous, BID  ampicillin-sulbactam (UNASYN) 3000 mg in 100 mL NS IVPB minibag, 3,000 mg, IntraVENous, Q6H    ASSESSMENT:   S/p C3-6 ACDF and C3-T1 PCF on 7/18 - stable  Pharyngeal perforation    PLAN:  PT/OT  WBAT with cervical collar  Pain control  NPO  ENT following  AB per ID  Speech following. Plan for video swallow today.  If fail, will need PEG      Electronically signed by Rachel Patel PA-C on 7/21/2022 at 12:29 PM s/p anterior

## 2022-07-21 NOTE — PROGRESS NOTES
Upon doing shift change, this RN told nightshift RN that the pt had a general surgery consult for PEG tube placement. Pt stated he's \"fine all I want is food\". Educated pt that he failed his swallow and is not to eat or drink anything by mouth. Pt stated \"I ate fruit earlier and I'm fine\". This RN asked how the pt got fruit. Pt replied \"don't worry about it\". Note now placed on pt's door asking visitors to see nurses station before entering room. Also called telesitter office to get a camera.

## 2022-07-21 NOTE — PROGRESS NOTES
Occupational Therapy  OT BEDSIDE TREATMENT NOTE   [facility] [address] [address], [address] [place] [address] [address]        Date:2022  Patient Name: Aleyda Turicos  MRN: [id number]  : 1955  Room: [address] [place] [address]     Per OT Eval:    Evaluating OT:Tammie Claudio, OTR/L  License #  [id number]       Referring Provider: Luis Gordillo MD     Specific Provider Orders/Date: OT evaluation & treatment       Diagnosis: C3-C4, C4-C5, C5-C6 HERNIATED DISC  C3-C7 STENOSIS     Pertinent Medical History:  has a past medical history of Cancer ([facility] [address] Surgery: 22: ANTERIOR C3-C4, C4-C5,  AND C5-C6 CERVICAL DISCECTOMY AND FUSION  POSTERIOR C3-C7 LAMINECTOMY, POSTERIOR C3-T1 FUSION  PHARYNGEAL TEAR REPAIR    Past Surgical History:  has a past surgical history that includes Skin graft; Hemorrhoid surgery; cervical fusion (N/A, 2022); cervical fusion (N/A, 2022); and Throat surgery (N/A, 2022). Precautions:  Fall Risk, custom collar, NPO, suction, cervical precautions      Assessment of current deficits     [x] Functional mobility            [x]ADLs           [x] Strength                  [x]Cognition   [x] Functional transfers          [x] IADLs         [x] Safety Awareness   [x]Endurance   [x] Fine Coordination                         [x] Balance      [] Vision/perception   [x]Sensation      []Gross Motor Coordination             [] ROM           [] Delirium                   [] Motor Control     OT PLAN OF CARE   OT POC based on physician orders, patient diagnosis and results of clinical assessment     Frequency/Duration: 2-4 days/wk for 2 weeks PRN  Specific OT Treatment Interventions to include:    Instruction/training on adapted ADL techniques and AE recommendations to increase functional independence within precautions  Training on energy conservation strategies, correct breathing pattern and techniques to improve independence/tolerance for self-care routine  Functional transfer/mobility training/DME recommendations for increased independence, safety, and fall prevention  Patient/Family education to increase follow through with safety techniques and functional independence  Recommendation of environmental modifications for increased safety with functional transfers/mobility and ADLs  Splinting/positioning for increased function, prevention of contractures, and improve skin integrity  Therapeutic exercise to improve motor endurance, ROM, and functional strength for ADLs/functional transfers  Therapeutic activities to facilitate/challenge dynamic balance, stand tolerance for increased safety and independence with ADLs  Therapeutic activities to facilitate gross/fine motor skills for increased independence with ADLs  Positioning to improve skin integrity, interaction with environment and functional independence     Recommended Adaptive Equipment:  TBD in Rehab     Home Living: Pt lives with god-sister in a 3 story with 4 steps to enter with no HR. Bed on 1st, Bath on 2nd level.   Bathroom setup: tub/shower, low toilet   Equipment owned: spc, w/c     Prior Level of Function: Pt. States recently had assist with ADLs & with IADLs; ambulated spc short distances, w/c longer distances  Driving: relies on family  Occupation: retired, tree cutting     Pain Level: Pt complained of neck pain this session  Cognition: A&O: 4/4; Follows 2 step directions              Memory:  F+              Sequencing:  F              Problem solving:  F              Judgement/safety:  F                Functional Assessment:  AM-PAC Daily Activity Raw Score: 12/24    Initial Eval Status  Date: 7-19-22 Treatment Status  Date: 7/20/22 STGs = LTGs  Time frame: 10-14 days   Feeding Min A with suction tube to mouth using R hand  NPO Set up   Grooming Max A with oral care Ginny  Pt washed face, applied deodorant, assistance to remove neck brace and wash neck Stand by Assist    UB Dressing Dep modA  [facility] gown seated EOB  Stand by Assist    LB Dressing Dep  Dependent  [facility] socks, attempting cross over technique, pt unable Minimal Assist    Bathing Dep  maxA  Simulated Task    Pt able to wash of UB, assistance to wash of LB, buttocks and lavon area Minimal Assist    Toileting Dep  Dependent  Pt attempting to use of urinal upon arrival, with incontinence of urine on bed sheets Minimal Assist    Bed Mobility  Logroll: Min A  Supine to sit: Mod A  Sit to supine: Mod A modA  Supine<>EOB Supine to sit: Supervision   Sit to supine: Supervision    Functional Transfers Mod A with sit <> stand, use of ww  modA  Sit to Stand  Stand to Sit     Stand by Assist    Functional Mobility Mod A with few side steps towards HOB with ww  modA  Pt ambulated short household distance in room with w.w, slow pace Stand by Assist    Balance Sitting:    Static:  Min A/SBA    Dynamic:Min A  Standing: Mod A  Sitting EOB:  CGA    Standing:  modA     Activity Tolerance P+/F- Fair-  F+   Visual/  Perceptual Glasses: yes          Vitals spO2 & HR   WFL      Hand Dominance R    AROM (PROM) Strength Additional Info:   RUE  R shld. to 70  Elbow WFL  Wrist 50%  Hand WFL Grossly 3+/5 F+  and Fair FMC/dexterity noted during ADL tasks      LUE L shld. Minimal d/t chronic RC injury  L elbow 30-90  L wrist minimal  L hand, functional , only 25% ext. Grossly 2+/3- F-  and Poor FMC/dexterity noted during ADL tasks. Noted L swan neck deformities         Hearing: WFL  Sensation:  No c/o numbness or tingling B hands  Tone: WFL  Edema: none noted    Education:  Pt was educated on role of OT, goals to be reached, importance of OOB activity, precautions to follow, safety and hand placement with transfers, safety and walker management with functional mobility, and techniques to assist with UB/LB dressing task.      Comments: Upon arrival pt supine in bed, agreeable to therapy with encouragement, nursing okaying pt to be seen this session. At end of session, pt seated upright in chair, all lines and tubes intact, call light within reach. Pt has made fair progress towards set goals.    Continue with current plan of care focusing on increasing of independency with transfers and ADL tasks      Treatment Time In: 10:30am           Treatment Time Out: 11:00am               Treatment Charges: Mins Units   Ther Ex  26065     Manual Therapy 26485     Thera Activities 91585 20 1   ADL/Home Mgt 59127 10 1   Neuro Re-ed 32645     Group Therapy      Orthotic manage/training  20825     Non-Billable Time     Total Timed Treatment 30 2        [place]/L [id number]

## 2022-07-21 NOTE — PROGRESS NOTES
Physical Therapy  Physical Therapy Treatment     Name: Abdelrahman Cerda  : 1955  MRN: [id number]      Date of Service: 2022    Evaluating PT:  Ayala Pacheco, PT, DPT [id number]    Room #:  6774/0095-V  Diagnosis:  Herniated disc, cervical [M50.20]  Cervical stenosis of spine [M48.02]  Cervical spinal stenosis [M48.02]  Cervical stenosis of spinal canal [M48.02]  Cervical myelopathy ([address] [G95.9]  PMHx/PSHx:  Prostate CA  Procedure/Surgery:  ANTERIOR C3-C4, C4-C5,  AND C5-C6 CERVICAL DISCECTOMY AND FUSION; POSTERIOR C3-C7 LAMINECTOMY, POSTERIOR C3-T1 FUSION; PHARYNGEAL TEAR REPAIR (2022)  Precautions:  Falls, spinal precautions, cervical collar, hemovac, , Equipment Needs:  FWW  Equipment Owned:  SPC, wheelchair    SUBJECTIVE:    Pt lives with god-sister in a 3 story home with 4 stair(s) to enter and 0 rail(s). Bed is on 1st floor and bath is on 2nd floor. There is a full flight of stairs with 1 rail to 2nd floor. Pt ambulated short distances with SPC PTA; used wheelchair for long distance mobility. OBJECTIVE:   Initial Evaluation  Date: 2022 Treatment  2022 Short Term/ Long Term   Goals   AM-PAC 6 Clicks 50/37 54/58    Was pt agreeable to Eval/treatment? Yes Yes    Does pt have pain? 7/10 head, neck Cervical pain     Bed Mobility  Rolling: Bob  Supine to sit: ModA  Sit to supine: ModA  Scooting: ModA (seated) Rolling: Bob  Supine to sit: mod   Sit to supine: NT  Scooting: Bob (seated) Rolling: Independent  Supine to sit:  Independent  Sit to supine: Independent  Scooting: Independent   Transfers Sit to stand: ModA  Stand to sit: ModA  Stand pivot: NT Sit to stand: Bob   Stand to sit: ModA (chair)  Stand pivot: ModA with [employer] Sit to stand: Heather  Stand to sit: Heather  Stand pivot: Heather with AAD   Ambulation    5 feet with [employer] ModA 20  with [employer] + chair follow min/ModA 75 feet with AAD Heather   Stair negotiation: ascended and descended  NT NT 12 step(s) with 1 rail(s) Heather   Wheelchair

## 2022-07-21 NOTE — PROGRESS NOTES
[address] [place] [address] [facility]  Progress Note    Chief complaint: Numbness in the upper extremities and difficulty ambulating      SUBJECTIVE:  Patient is tolerating medications. No reported adverse drug reactions. No nausea, vomiting, diarrhea. Status post cervical fusion  Afebrile alert  Wanting something to drink. For swallow eval  Review of systems:  As stated above in the chief complaint, otherwise negative. Medications:  Scheduled Meds:   acetaminophen  650 mg Oral Q6H    sodium chloride flush  5-40 mL IntraVENous 2 times per day    famotidine  20 mg Oral BID    Or    famotidine (PEPCID) injection  20 mg IntraVENous BID    ampicillin-sulbactam  3,000 mg IntraVENous Q6H     Continuous Infusions:   sodium chloride 100 mL/hr at 22 8694    dextrose      sodium chloride      sodium chloride       PRN Meds:glucose, dextrose bolus **OR** dextrose bolus, glucagon (rDNA), dextrose, sodium chloride, sodium chloride flush, sodium chloride, ondansetron **OR** ondansetron, morphine **OR** morphine, diphenhydrAMINE **OR** diphenhydrAMINE, bisacodyl, fleet    OBJECTIVE:  BP (!) 145/88   Pulse 74   Temp 97.6 °F (36.4 °C) (Temporal)   Resp 18   Ht 5' 9\" (1.753 m)   Wt 128 lb (58.1 kg)   SpO2 99%   BMI 18.90 kg/m²   Temp  Av.2 °F (36.8 °C)  Min: 97.6 °F (36.4 °C)  Max: 99.1 °F (37.3 °C)  Constitutional: The patient is awake, alert, and oriented. Skin: Warm and dry. No rashes were noted. HEENT: Round and reactive pupils. Moist mucous membranes. No ulcerations or thrush. Neck: Soft,  Chest: No use of accessory muscles to breathe. Symmetrical expansion. No wheezing, crackles or rhonchi. Cardiovascular: S1 and S2 are rhythmic and regular. No murmurs appreciated. Abdomen: Positive bowel sounds to auscultation. Benign to palpation. No masses felt. No hepatosplenomegaly. Genitourinary: Male  Extremities: No clubbing, no cyanosis, no edema.   Lines: peripheral    Laboratory and 3/10 days  Check final cultures  Monitor labs    REBECCA Mcknigth CNP  8:43 AM  7/21/2022      Pt seen and examined. Above discussed agree with advanced practice nurse. Labs, cultures, and radiographs reviewed. Face to Face encounter occurred. Changes made as necessary.      Marychuy Abbasi MD

## 2022-07-21 NOTE — PROGRESS NOTES
Acute Rehab Pre-Admission Screen      Referral date: 7/19/2022  Onset/Hospital Admit Date: 7/18/2022  5:24 AM    Current Location: 19/5219-A    Name: Marcello Trujillo  YOB: 1955  Age: 75 y.o. Admitting Diagnosis: Cervical Myelopathy   Address: [address] [place] [address]. Mili alvares, [address] [place] [address]  Home Phone: [phone] (home)  Malik Ratliff #:     Sex: male  Race:B. Black or   Ethnicity: A. No, not of ,  or [place]ian origin    Marital Status:    Ethnic/Cultural/Scientologist Considerations: Episcopalian    Advanced Directives: [x] Full Code  [] [address] [] Medications only       [] Living Will  [] DPOA      []Organ donor      [] No mechanical breathing or ventilation     [] no tube feeding, nutrition or hydration      [x] Patient does not have advanced directives or living will     Copies in Chart: no    COVERAGE INFORMATION   Primary Insurer: [facility]  Payor Contact: ***  Phone: ***  FAX:***  Authorization #: [id number]  Secondary Insurer: ***  Medicare #: ***  Medicaid #: ***  Verified coverage: [] Patient  [] Family/caregiver    [x] financial department [] insurance carrier    COVID SCREEN DATE:  Result: (negative, positive)      MEDICAL UPDATE:  History of present admission: Berkley Abrams is a 75 y.o. male who presented to the doctor with neck pain, Myelopathy with hyperfllexia, gait instability, and loss of dexterity. 7/18/2022 had Anterior C3-C4, C4-C5, C5-C6 cervical discectomy and fusion posterior C3-C7 Laminectomy, Posterior C3-T1 fusion. Patient had a pharyngeal tear with repair during the surgery. Peg tube insertion 7/22/2022. PHYSICIAN / REFERRAL INFORMATION  Referring Physician: Dr. Rigo May  Attending Physician: Rigo May MD  Primary Care Physician: Arsenio Navarrete DO  Consultants/Opinions (see full consult notes on chart):  Infectious disease (prophylaxis for pharyngeal tear)     SOCIAL INFORMATION  Primary  Contact: Tylor Elam  Relationship: other  Primary Phone: [phone]    Secondary  Contact: Thien Donovan  Relationship: brother/sister  Primary Phone: none listed    Contact: Tess Ray  Relationship: friend-no phone number listed    Previous Community Services: ***  Caregiver available: [x] Yes [] No Hours per day available: 24/7  Patient previously employed:  [] Yes [] Part Time [] Full Time [] No [x] Retired  Occupation/Profession: Tree Cutting  Prior living arrangements: [x] Home  [] Assisted living  [] SNF [] Other  Lived with:  [] Alone  [] Spouse  [x] Family  [] Other  Lived with: [employer] phone: ***  Home:  [address] [place] home  4 entry steps  Rails: 0  Steps:  flight to 2nd floor  Rails:  1   Bedroom: [x] 1st floor  [] 2nd floor    Bathroom:  [x] 1st floor  [] 2nd floor    Prior Functional Level: Independent for: ***  Assistance for: ADL's and IADL's. Ambulated short distances  Dependent for: w/c for long distances  Dominant hand: [] Right  [] Left    Previous Home Equipment:  [x] Cane [] Grab bars [] Orthotic / prosthetic   [] Shower chair [] Tub bench  [] 3-in-1 Commode [] Long handle sponge   [] Oxygen [] Sock aide  [x] Wheelchair  [] motorized wc/scooter  [] Wheelchair cushion   [] Crutches [] Long handle shoehorn  [] Reachers [] Toilet seat elevator [] Rollator  [] Walker(wheeled)   [] Walker(standard) [] Mechanical lift    [] None of the above     Has patient had 2 or more falls in the past year or any fall with injury in the past year? [] yes   [] no   [x]unknown    Has patient had major surgery during past 100 days prior to admission? [x] yes   [] no Type/ Date: 7/18/2022 Anterior Cervical Fusion, and C5-C6 Cervical Posterior Discectomy and fusion, C3-C7 Laminectomy, Posterior C3 -T1 Fushion. 7/22/2022 Peg tube insertion.     Surgical History:  Past Surgical History:   Procedure Laterality Date    CERVICAL FUSION N/A 7/18/2022    ANTERIOR C3-C4, C4-C5,  AND C5-C6 CERVICAL DISCECTOMY AND FUSION performed by Priscila David MD at [address] [place] [address] FUSION N/A 7/18/2022    POSTERIOR C3-C7 LAMINECTOMY, POSTERIOR C3-T1 FUSION performed by Rowdy Lau MD at [address] [place] [address] N/A 7/18/2022    PHARYNGEAL TEAR REPAIR performed by Rowdy Lau MD at [facility] OR       Past Medical:  Past Medical History:   Diagnosis Date    Cancer ([facility] [address] 2013    Prostate       Current Co-morbidities:  [] Alzheimer's   [] Dysphasia    [] Parkinsonism  [] Amputation   [] GERD   [] Peripheral artery disease   [] Anemia      [] Encephalopathy  [] Peripheral vascular disease  [] Anxiety   [] Gangrene   [] Pneumonia  [] Aphasia   [] Gout   [] Polyneuropathy  [] Asthma   [] Heart Failure (diastolic) [] Post-polio syndrome  [] Atrial fibrillation  [] Heart Failure (left-sided) [] Pseudomonas enteritis   [] Blind   [] Heart Failure (right-sided) [] Pulmonary embolism  [] Cellulitis     [] Heart Failure (systolic) [] Renal dialysis  [] Clostridium difficile  [] Hemiparesis  [] Renal failure  [] Congestive heart failure [] Hypertension  [] Rheumatoid arthritis  [] COPD   [] Hypotension  [] Seizure disorder   [] Coronary Artery Disease [] Hypothyroidism  [] Septicemia   [] Deaf   [] Hyperlipidemia  [] Sleep apnea  [] Depression   [] Morbid obesity  [] Spinal cord injury  [] Diabetes   [] MRSA   [] Stroke  [] Diabetic nephropathy [] Myocardial infarction [] Tracheostomy  [] Diabetic neuropathy [] Osteoarthritis  [] Traumatic brain injury   [] Diabetic retinopathy [] Osteoporosis  [] Urinary tract infection  [] DVT   [] Pancytopenia  [] Vocal cord paralysis  [] *** Spinal stenosis  [] *** kidney disease  [] VRE  [] Post op ***   [] ***    [] ***       Medical/Functional Conditions requiring inpatient rehabilitation: Patient has functional deficits in ADLS, mobility, speech, swallow and cognition, impaired balance, and needs ongoing medical management   Requires multidisciplinary treatment including PM&R physician daily care, 24 hour rehabilitation nursing, physical therapy, occupational therapy, rehabilitation psychology, recreation therapy and rehabilitation social work, nutrition services due to new deficits     Risk for Medical/Clinical Complications: Falls, injury, pain, skin breakdown, abnormal vitals, abnormal labs, DVT, PE, pneumonia, decreased mobility, neuro changes    CLINICAL DATA:     Height : 5'9\"     Weight:  125   BMI: 18.59       Date: 7/25/2022 Date: *** Date: ***   temperature 97.7     pulse 75     respirations 16     Blood pressure 122/70     Pulse oximeter 94 % on room air        ALLERGIES: Patient has no known allergies.     DIET : Diet NPO Exceptions are: Sips of Water with Meds    Current Lab and Diagnostic Tests:   Recent Results (from the past 24 hour(s))   POCT Glucose    Collection Time: 07/20/22 11:41 PM   Result Value Ref Range    Meter Glucose 109 (H) 74 - 99 mg/dL   POCT Glucose    Collection Time: 07/21/22  6:09 AM   Result Value Ref Range    Meter Glucose 90 74 - 99 mg/dL   Comprehensive Metabolic Panel    Collection Time: 07/21/22  7:16 AM   Result Value Ref Range    Sodium 140 132 - 146 mmol/L    Potassium 3.6 3.5 - 5.0 mmol/L    Chloride 105 98 - 107 mmol/L    CO2 23 22 - 29 mmol/L    Anion Gap 12 7 - 16 mmol/L    Glucose 98 74 - 99 mg/dL    BUN 12 6 - 23 mg/dL    Creatinine 0.6 (L) 0.7 - 1.2 mg/dL    GFR Non-African American >60 >=60 mL/min/1.73    GFR African American >60     Calcium 8.4 (L) 8.6 - 10.2 mg/dL    Total Protein 6.7 6.4 - 8.3 g/dL    Albumin 3.3 (L) 3.5 - 5.2 g/dL    Total Bilirubin 2.2 (H) 0.0 - 1.2 mg/dL    Alkaline Phosphatase 56 40 - 129 U/L    ALT 23 0 - 40 U/L    AST 34 0 - 39 U/L   CBC with Auto Differential    Collection Time: 07/21/22  7:16 AM   Result Value Ref Range    WBC 12.7 (H) 4.5 - 11.5 E9/L    RBC 4.14 3.80 - 5.80 E12/L    Hemoglobin 11.4 (L) 12.5 - 16.5 g/dL    Hematocrit 33.9 (L) 37.0 - 54.0 %    MCV 81.9 80.0 - 99.9 fL    MCH 27.5 26.0 - 35.0 pg    MCHC 33.6 32.0 - 34.5 %    RDW 15.7 (H) 11.5 - 15.0 fL    Platelets 776 948 - 771 E9/L    MPV 9.6 7.0 - 12.0 fL    Neutrophils % 84.3 (H) 43.0 - 80.0 %    Immature Granulocytes % 0.5 0.0 - 5.0 %    Lymphocytes % 7.0 (L) 20.0 - 42.0 %    Monocytes % 7.9 2.0 - 12.0 %    Eosinophils % 0.2 0.0 - 6.0 %    Basophils % 0.1 0.0 - 2.0 %    Neutrophils Absolute 10.74 (H) 1.80 - 7.30 E9/L    Immature Granulocytes # 0.06 E9/L    Lymphocytes Absolute 0.89 (L) 1.50 - 4.00 E9/L    Monocytes Absolute 1.00 (H) 0.10 - 0.95 E9/L    Eosinophils Absolute 0.03 (L) 0.05 - 0.50 E9/L    Basophils Absolute 0.01 0.00 - 0.20 E9/L     XR CHEST (2 VW)    Result Date: 7/13/2022  EXAMINATION: TWO XRAY VIEWS OF THE CHEST 7/13/2022 9:49 am COMPARISON: 11 November 2021 HISTORY: ORDERING SYSTEM PROVIDED HISTORY: Pre-op testing TECHNOLOGIST PROVIDED HISTORY: What reading provider will be dictating this exam?->CRC FINDINGS: Linear opacity at the left lower lobe is likely atelectasis. Normal heart and pulmonary vascularity. Neither costophrenic angle is blunted. Likely atelectasis at the left lower lobe.        Additional labs or diagnostic studies needed before admission to rehabilitation unit:  ***    Medications:   sodium chloride flush  5-40 mL IntraVENous 2 times per day    famotidine  20 mg Oral BID    Or    famotidine (PEPCID) injection  20 mg IntraVENous BID    ampicillin-sulbactam  3,000 mg IntraVENous Q6H      sodium chloride 100 mL/hr at 07/20/22 2244    dextrose      sodium chloride      sodium chloride       glucose, dextrose bolus **OR** dextrose bolus, glucagon (rDNA), dextrose, sodium chloride, sodium chloride flush, sodium chloride, ondansetron **OR** ondansetron, morphine **OR** morphine, diphenhydrAMINE **OR** diphenhydrAMINE, bisacodyl, fleet    SPECIAL PRECAUTIONS: [] No current precautions  [] Cardiac  [] Renal [] Sternal [] Respiratory      [] Neurological           [] Hip  [] Spinal [] Seizure  [] Aspiration  [] Isolation precautions:    [] Contact   [] Respiratory   [] dependent      Toilet Transfers OT dependent     Maximal assist      Tub/Shower Transfers OT NT NT    Homemaking OT NT NT    Bed Mobility PT Moderate assist     Minimal assist      Bed/Wheelchair Transfers PT Moderate assist   Minimal assist      Locomotion Walk / Wheelchair  Device:  Distance: PT Moderate assist  [employer]  5 feet Minimal assist  [employer]  5 feet    Endurance PT - -    Expression SP - -    Social Interaction SP - -    Problem Solving SP F -    Memory SP F+ -    Comprehension SP - -    Swallowing SP NPO NPO    Bowel Management NSG      Bladder Management NSG Continent Continent      Comments on Functional Status: Able to tolerate 3 hours of therapy per day split into four 45 minute sessions. [x] Able to participate a minimum of 3 hours per day of therapy intervention    Required treatments/services: [x] Rehabilitation nursing [] Dietitian / nurtition                 [x] Case management  [] Respiratory Therapy      [x] Social work   [] Other     Required Therapy:  Therapy Hours per Day Days per Week Therapeutic Interventions Required   [x] Physical Therapy 1 5-7 Gait, transfers, Safety, strength, education, endurance   [x] Occupational Therapy 1 5-7 ADLs, IADLs, Safety, strength, education, endurance   [x] Speech Pathology 1 5-7 Speech, swallow, cognition, and safety.    [] Prosthetics / Orthotics       []         Anticipated Discharge Plan:   Anticipated DME Needs:  [x] Home     [] Commode   [] Alone    [] Wheelchair   [] Supervised    [] Walker   [x] Assist    [] Oxygen        [] Hospital Bed  [] Assisted Living    [] Ramp        [x] To Be Determined    Anticipated Home Health Services:  Anticipated Outpatient Services:  [] PT       [] PT  [] OT      [] OT  [] Speech     [] Speech  [] Nursing     [] Dialysis  [] Aide      [x] To Be Determined  [x] To Be Determined    Anticipated support group:  [] Amputation  [] Multiple Sclerosis  [] Stroke  [] Brain Injury  [] Spinal cord injury  [] Other     Barriers to discharge: Impaired self care and impaired mobility. Discharge Support: [] Patient lives alone and does not have a caregiver available     [x] Patient has a caregiver available     [x] Discharge plan has been verified with patient's caregiver      [x] Caregiver is in agreement with the discharge plan     Expected functional status for safe discharge: Assist with ADL's and IADL's as before surgery. Patient/support person goals: To go home    Expected length of stay: 2 weeks    Discussed expected length of stay and agreeable to IRF plan: [x] Yes   [] No    Impairment Group Category: [id number]    Etiological Diagnosis: Cervical myelopathy    Primary Rehabilitation Diagnosis: Cervical myelopathy    Electronically signed by Lu Mackey RN on 7/21/2022 at 11:37 AM    Prescreen completed __________________________________ (signature of prescreener)    Date:   *** Time:  ***    JUSTIFICATION FOR ADMISSION TO ACUTE REHABILITATION:  Patient has suffered decline in functional abilities for gait, transfers, speech, swallowing, cognition,  ADL's and IADL's as well as endurance. Patient has functional deficits requiring intensive therapy across multiple disciplines in order to return home safely. Patient will need physician oversight for respiratory issues, abnormal vital signs, nutritional and hydration status, safety issues, medications and therapy modalities. PT, OT and speech will work on deficits as noted in evaluations. Case management and social work will provide services for DME and management of a safe discharge home.           RECOMMEND LEVEL OF CARE  Recommend inpatient rehabilitation: [] Yes   [] No  If no indicate reason:  [] Functional level too high  [] Unmotivated  [] No insurance carrier approval [] Unlikely to return to community  [] No medical necessity  [] Patient or family chose other facility  [] Too medically complex  [] Inadequate discharge plan  [] Rehabilitation bed unavailable [] Functional level too low  [] patient or family refused ARU    If patient not accepted for IRF admission, recommended level of care:  [] [address]  [] [address]  [] [place]   [] Home Care  [] Other      [] LTAC       Physician Assigned:  [] Dr. Rivera Granados         [] Dr. Ivana Barahona              [] Dr. Sarath Foreman [] Dr. Eldon Greer  [] Dr. Herr Sabrina:    ____________________________________________________________________  ____________________________________________________________________  ____________________________________________________________________  ____________________________________________________________________  ____________________________________________________________________      Physician Signature:_____________________________________    Print Signature:_________________________________________    Date:   *** Time:    ***    PRE-SCREEN ASSESSMENT UPDATE (if not admitted within 48 hours of initial pre-screen)    Medical Update/Changes: Prescreen updated to reflect current data since initiated. Functional Update/Changes: Therapy notes updated on prescreen graph to reflect current status.     Reviewer Signature:_____________________________________    Date:  *** Time: ***    PHYSICIAN ADMISSION DETERMINATION AND REVIEW UPDATE:     ____________________________________________________________________  ____________________________________________________________________  ____________________________________________________________________  ____________________________________________________________________  ____________________________________________________________________    Physician Signature:_____________________________________    Print Signature:_________________________________________    Date: ***    Time:  ***      *** Be sure all blanks are filled in and then delete this phrase

## 2022-07-21 NOTE — CARE COORDINATION
7/21/22 Update CM Note: Patient is POD 3 of ACDF/PCF with a pharyngeal tear. He remains NPO by mouth. Esophogram from yesterday reveals aspiration. Will attempt barium swallow eval again today. Patient remains on iv unasyn day 3 of total days 10 per id notes. He remains on iv pepcid bid and flds at 50hr. His drain remains intact, collar remains on. Patient is with much difficulty today with secretions and congestion. He is suctioning orally. CXR is pending. He is unable to perform the smi due to weakness. Patient is being followed by ARU at [facility]. PT 13/24 and OT 12/24 yesterday. Will continue to follow patient for swallow report and plan if failed again.  Electronically signed by Calista Halsted, RN CM on 7/21/2022 at 10:46 AM

## 2022-07-21 NOTE — PROGRESS NOTES
SPEECH/LANGUAGE PATHOLOGY  VIDEOFLUOROSCOPIC STUDY OF SWALLOWING (MBS)   and PLAN OF CARE    PATIENT NAME:  Jarred Evans  (male)     MRN:  [id number]    :  1955  (75 y.o.)  STATUS:  Inpatient: Room 5219/5219-A    TODAY'S DATE:  2022  REFERRING PROVIDER:   Dr. Dk Arana: SLP video swallow  Date of order:  22   REASON FOR REFERRAL: dysphagia following ACDF   EVALUATING THERAPIST: Natacha Mckinney, SLP      RESULTS:      DYSPHAGIA DIAGNOSIS:  profound  pharyngeal phase dysphagia     DIET RECOMMENDATIONS:  NPO      FEEDING RECOMMENDATIONS:    Assistance level:  Not applicable     Compensatory strategies recommended: Not applicable     Discussed recommendations with nursing and/or faxed report to referring provider: Nursing not available, diet change recommendation placed in Physician sticky note section     SPEECH THERAPY  PLAN OF CARE   The dysphagia POC is established based on physician order and dysphagia diagnosis    Dysphagia therapy is not recommended       Conditions Requiring Skilled Therapeutic Intervention for dysphagia:    Not applicable    SPECIFIC DYSPHAGIA INTERVENTIONS TO INCLUDE:     not applicable    Specific instructions for next treatment:  not applicable   Treatment Goals:    Short Term Goals:  Not applicable no therapy warranted     Long Term Goals:   Repeat Video Swallow Study (MBSS) is recommended when swelling is reduced and requires a new physician order     Patient/family Goal:    Did not state. Will further assess during treatment.     Plan of care discussed with Patient   The Patient understand(s) the diagnosis, prognosis and plan of care     Rehabilitation Potential/Prognosis: good                      ADMITTING DIAGNOSIS: Herniated disc, cervical [M50.20]  Cervical stenosis of spine [M48.02]  Cervical spinal stenosis [M48.02]  Cervical stenosis of spinal canal [M48.02]  Cervical myelopathy ([address] [G95.9]     VISIT DIAGNOSIS:   Visit Diagnoses Codes    Pre-operative laboratory examination    -  Primary Z01.812    Pre-op testing     Z01.818    Herniated disc, cervical     M50.20                PATIENT REPORT/COMPLAINT: patient currently NPO pending results of this evaluation    PRIOR LEVEL OF SWALLOW FUNCTION:    Past History of Dysphagia?:  none reported    Current Diet Order:  Diet NPO Exceptions are: Sips of Water with Meds    PROCEDURE:  Consistencies Administered During the Evaluation   Liquids: nectar thick liquid   Solids:  pureed foods      Method of Intake:   spoon  Fed by clinician      Position:   Seated, upright, Lateral plane    INSTRUMENTAL ASSESSMENT:    ORAL PREP/ ORAL PHASE:    The oral stage of swallowing was within functional limits for consistencies administered     PHARYNGEAL PHASE:     ONSET TIME       Delayed initiation of the pharyngeal swallow was noted with swallow reflex triggered at the level of the pyriform sinus        PHARYNGEAL RESIDUALS        Vallecula/Pharyngeal Wall           Reduced tongue base retraction resulting in residuals in vallecula and/or posterior pharyngeal wall for nectar consistency liquid and pureed foods which partially cleared  with cued double swallow       Pyriform Sinuses      No significant residuals were noted in the pyriform sinuses     LARYNGEAL PENETRATION   Laryngeal penetration occurred prior to aspiration. Further details under aspiration section. ASPIRATION  Aspiration occurred BEFORE the swallow for nectar consistency liquid and pureed foods due to delayed pharyngeal swallow . Aspiration was severe-profound and occurred consistently . an absent cough/throat clear was noted  Aspiration occurred DURING the swallow for nectar consistency liquid and pureed foods due to  inadequate laryngeal closure . Aspiration was mild and occurred consistently . an absent cough/throat clear was noted    PENETRATION-ASPIRATION SCALE (PAS):  THIN item not administered  MILDLY THICK 8 = Material enters

## 2022-07-21 NOTE — PROGRESS NOTES
This RN went into room to round. Pt had a orange pop. Educated the pt on the reasons why the pt is NPO. Gave pt's visitor back the beverage. Will continue to monitor.

## 2022-07-22 ENCOUNTER — ANESTHESIA EVENT (OUTPATIENT)
Dept: OPERATING ROOM | Age: 67
DRG: 453 | End: 2022-07-22
Payer: OTHER GOVERNMENT

## 2022-07-22 ENCOUNTER — ANESTHESIA (OUTPATIENT)
Dept: OPERATING ROOM | Age: 67
DRG: 453 | End: 2022-07-22
Payer: OTHER GOVERNMENT

## 2022-07-22 PROBLEM — Z01.812 PRE-OPERATIVE LABORATORY EXAMINATION: Status: ACTIVE | Noted: 2022-07-22

## 2022-07-22 PROBLEM — E43 SEVERE PROTEIN-CALORIE MALNUTRITION (HCC): Chronic | Status: ACTIVE | Noted: 2022-07-22

## 2022-07-22 LAB
ANION GAP SERPL CALCULATED.3IONS-SCNC: 15 MMOL/L (ref 7–16)
BUN BLDV-MCNC: 14 MG/DL (ref 6–23)
CALCIUM SERPL-MCNC: 8.4 MG/DL (ref 8.6–10.2)
CHLORIDE BLD-SCNC: 102 MMOL/L (ref 98–107)
CO2: 23 MMOL/L (ref 22–29)
CREAT SERPL-MCNC: 0.6 MG/DL (ref 0.7–1.2)
GFR AFRICAN AMERICAN: >60
GFR NON-AFRICAN AMERICAN: >60 ML/MIN/1.73
GLUCOSE BLD-MCNC: 83 MG/DL (ref 74–99)
HCT VFR BLD CALC: 33.1 % (ref 37–54)
HEMOGLOBIN: 11.3 G/DL (ref 12.5–16.5)
MCH RBC QN AUTO: 27.7 PG (ref 26–35)
MCHC RBC AUTO-ENTMCNC: 34.1 % (ref 32–34.5)
MCV RBC AUTO: 81.1 FL (ref 80–99.9)
METER GLUCOSE: 78 MG/DL (ref 74–99)
PDW BLD-RTO: 15.4 FL (ref 11.5–15)
PLATELET # BLD: 249 E9/L (ref 130–450)
PMV BLD AUTO: 9.8 FL (ref 7–12)
POTASSIUM SERPL-SCNC: 3.2 MMOL/L (ref 3.5–5)
RBC # BLD: 4.08 E12/L (ref 3.8–5.8)
SODIUM BLD-SCNC: 140 MMOL/L (ref 132–146)
WBC # BLD: 8.2 E9/L (ref 4.5–11.5)

## 2022-07-22 PROCEDURE — 82962 GLUCOSE BLOOD TEST: CPT

## 2022-07-22 PROCEDURE — 80048 BASIC METABOLIC PNL TOTAL CA: CPT

## 2022-07-22 PROCEDURE — 7100000000 HC PACU RECOVERY - FIRST 15 MIN: Performed by: SURGERY

## 2022-07-22 PROCEDURE — 97535 SELF CARE MNGMENT TRAINING: CPT

## 2022-07-22 PROCEDURE — 2500000003 HC RX 250 WO HCPCS

## 2022-07-22 PROCEDURE — 36415 COLL VENOUS BLD VENIPUNCTURE: CPT

## 2022-07-22 PROCEDURE — 3700000000 HC ANESTHESIA ATTENDED CARE: Performed by: SURGERY

## 2022-07-22 PROCEDURE — 1200000000 HC SEMI PRIVATE

## 2022-07-22 PROCEDURE — 3E0G76Z INTRODUCTION OF NUTRITIONAL SUBSTANCE INTO UPPER GI, VIA NATURAL OR ARTIFICIAL OPENING: ICD-10-PCS | Performed by: SURGERY

## 2022-07-22 PROCEDURE — 2580000003 HC RX 258

## 2022-07-22 PROCEDURE — 2500000003 HC RX 250 WO HCPCS: Performed by: SURGERY

## 2022-07-22 PROCEDURE — 6360000002 HC RX W HCPCS: Performed by: SURGERY

## 2022-07-22 PROCEDURE — 3700000001 HC ADD 15 MINUTES (ANESTHESIA): Performed by: SURGERY

## 2022-07-22 PROCEDURE — 6360000002 HC RX W HCPCS: Performed by: NEUROLOGICAL SURGERY

## 2022-07-22 PROCEDURE — 85027 COMPLETE CBC AUTOMATED: CPT

## 2022-07-22 PROCEDURE — 7100000001 HC PACU RECOVERY - ADDTL 15 MIN: Performed by: SURGERY

## 2022-07-22 PROCEDURE — 97530 THERAPEUTIC ACTIVITIES: CPT

## 2022-07-22 PROCEDURE — 94664 DEMO&/EVAL PT USE INHALER: CPT

## 2022-07-22 PROCEDURE — A4216 STERILE WATER/SALINE, 10 ML: HCPCS | Performed by: SURGERY

## 2022-07-22 PROCEDURE — 2580000003 HC RX 258: Performed by: SURGERY

## 2022-07-22 PROCEDURE — 0DH64UZ INSERTION OF FEEDING DEVICE INTO STOMACH, PERCUTANEOUS ENDOSCOPIC APPROACH: ICD-10-PCS | Performed by: SURGERY

## 2022-07-22 PROCEDURE — 2700000000 HC OXYGEN THERAPY PER DAY

## 2022-07-22 PROCEDURE — 2580000003 HC RX 258: Performed by: NEUROLOGICAL SURGERY

## 2022-07-22 PROCEDURE — 6360000002 HC RX W HCPCS

## 2022-07-22 PROCEDURE — 2580000003 HC RX 258: Performed by: ANESTHESIOLOGY

## 2022-07-22 PROCEDURE — 2580000003 HC RX 258: Performed by: PHYSICIAN ASSISTANT

## 2022-07-22 PROCEDURE — 6370000000 HC RX 637 (ALT 250 FOR IP): Performed by: NURSE PRACTITIONER

## 2022-07-22 PROCEDURE — 3600000003 HC SURGERY LEVEL 3 BASE: Performed by: SURGERY

## 2022-07-22 PROCEDURE — 3600000013 HC SURGERY LEVEL 3 ADDTL 15MIN: Performed by: SURGERY

## 2022-07-22 PROCEDURE — 43653 LAPAROSCOPY GASTROSTOMY: CPT | Performed by: SURGERY

## 2022-07-22 PROCEDURE — 94640 AIRWAY INHALATION TREATMENT: CPT

## 2022-07-22 PROCEDURE — 6360000002 HC RX W HCPCS: Performed by: INTERNAL MEDICINE

## 2022-07-22 PROCEDURE — 2709999900 HC NON-CHARGEABLE SUPPLY: Performed by: SURGERY

## 2022-07-22 RX ORDER — LIDOCAINE HYDROCHLORIDE 20 MG/ML
INJECTION, SOLUTION INTRAVENOUS PRN
Status: DISCONTINUED | OUTPATIENT
Start: 2022-07-22 | End: 2022-07-22 | Stop reason: SDUPTHER

## 2022-07-22 RX ORDER — IPRATROPIUM BROMIDE AND ALBUTEROL SULFATE 2.5; .5 MG/3ML; MG/3ML
1 SOLUTION RESPIRATORY (INHALATION)
Status: DISCONTINUED | OUTPATIENT
Start: 2022-07-22 | End: 2022-07-22 | Stop reason: HOSPADM

## 2022-07-22 RX ORDER — SODIUM CHLORIDE 0.9 % (FLUSH) 0.9 %
5-40 SYRINGE (ML) INJECTION EVERY 12 HOURS SCHEDULED
Status: DISCONTINUED | OUTPATIENT
Start: 2022-07-22 | End: 2022-07-22 | Stop reason: HOSPADM

## 2022-07-22 RX ORDER — ROCURONIUM BROMIDE 10 MG/ML
INJECTION, SOLUTION INTRAVENOUS PRN
Status: DISCONTINUED | OUTPATIENT
Start: 2022-07-22 | End: 2022-07-22 | Stop reason: SDUPTHER

## 2022-07-22 RX ORDER — SODIUM CHLORIDE 9 MG/ML
25 INJECTION, SOLUTION INTRAVENOUS PRN
Status: DISCONTINUED | OUTPATIENT
Start: 2022-07-22 | End: 2022-07-22 | Stop reason: HOSPADM

## 2022-07-22 RX ORDER — BUPIVACAINE HYDROCHLORIDE AND EPINEPHRINE 5; 5 MG/ML; UG/ML
INJECTION, SOLUTION EPIDURAL; INTRACAUDAL; PERINEURAL PRN
Status: DISCONTINUED | OUTPATIENT
Start: 2022-07-22 | End: 2022-07-22 | Stop reason: ALTCHOICE

## 2022-07-22 RX ORDER — DIPHENHYDRAMINE HYDROCHLORIDE 50 MG/ML
12.5 INJECTION INTRAMUSCULAR; INTRAVENOUS
Status: DISCONTINUED | OUTPATIENT
Start: 2022-07-22 | End: 2022-07-22 | Stop reason: HOSPADM

## 2022-07-22 RX ORDER — POTASSIUM CHLORIDE 7.45 MG/ML
10 INJECTION INTRAVENOUS ONCE
Status: COMPLETED | OUTPATIENT
Start: 2022-07-22 | End: 2022-07-22

## 2022-07-22 RX ORDER — MIDAZOLAM HYDROCHLORIDE 1 MG/ML
INJECTION INTRAMUSCULAR; INTRAVENOUS PRN
Status: DISCONTINUED | OUTPATIENT
Start: 2022-07-22 | End: 2022-07-22 | Stop reason: SDUPTHER

## 2022-07-22 RX ORDER — SUCCINYLCHOLINE/SOD CL,ISO/PF 200MG/10ML
SYRINGE (ML) INTRAVENOUS PRN
Status: DISCONTINUED | OUTPATIENT
Start: 2022-07-22 | End: 2022-07-22 | Stop reason: SDUPTHER

## 2022-07-22 RX ORDER — SODIUM CHLORIDE 0.9 % (FLUSH) 0.9 %
5-40 SYRINGE (ML) INJECTION PRN
Status: DISCONTINUED | OUTPATIENT
Start: 2022-07-22 | End: 2022-07-22 | Stop reason: HOSPADM

## 2022-07-22 RX ORDER — ONDANSETRON 2 MG/ML
INJECTION INTRAMUSCULAR; INTRAVENOUS PRN
Status: DISCONTINUED | OUTPATIENT
Start: 2022-07-22 | End: 2022-07-22 | Stop reason: SDUPTHER

## 2022-07-22 RX ORDER — TRAMADOL HYDROCHLORIDE 50 MG/1
50 TABLET ORAL
Status: DISCONTINUED | OUTPATIENT
Start: 2022-07-22 | End: 2022-07-22 | Stop reason: HOSPADM

## 2022-07-22 RX ORDER — ENOXAPARIN SODIUM 100 MG/ML
40 INJECTION SUBCUTANEOUS DAILY
Status: DISCONTINUED | OUTPATIENT
Start: 2022-07-23 | End: 2022-07-25 | Stop reason: HOSPADM

## 2022-07-22 RX ORDER — ACETAMINOPHEN 325 MG/1
650 TABLET ORAL
Status: DISCONTINUED | OUTPATIENT
Start: 2022-07-22 | End: 2022-07-22 | Stop reason: HOSPADM

## 2022-07-22 RX ORDER — DROPERIDOL 2.5 MG/ML
0.62 INJECTION, SOLUTION INTRAMUSCULAR; INTRAVENOUS
Status: DISCONTINUED | OUTPATIENT
Start: 2022-07-22 | End: 2022-07-22 | Stop reason: HOSPADM

## 2022-07-22 RX ORDER — PROPOFOL 10 MG/ML
INJECTION, EMULSION INTRAVENOUS PRN
Status: DISCONTINUED | OUTPATIENT
Start: 2022-07-22 | End: 2022-07-22 | Stop reason: SDUPTHER

## 2022-07-22 RX ORDER — MIDAZOLAM HYDROCHLORIDE 2 MG/2ML
2 INJECTION, SOLUTION INTRAMUSCULAR; INTRAVENOUS
Status: DISCONTINUED | OUTPATIENT
Start: 2022-07-22 | End: 2022-07-22 | Stop reason: HOSPADM

## 2022-07-22 RX ORDER — LABETALOL HYDROCHLORIDE 5 MG/ML
5 INJECTION, SOLUTION INTRAVENOUS
Status: DISCONTINUED | OUTPATIENT
Start: 2022-07-22 | End: 2022-07-22 | Stop reason: HOSPADM

## 2022-07-22 RX ORDER — PHENYLEPHRINE HCL IN 0.9% NACL 1 MG/10 ML
SYRINGE (ML) INTRAVENOUS PRN
Status: DISCONTINUED | OUTPATIENT
Start: 2022-07-22 | End: 2022-07-22 | Stop reason: SDUPTHER

## 2022-07-22 RX ORDER — DEXAMETHASONE SODIUM PHOSPHATE 10 MG/ML
INJECTION INTRAMUSCULAR; INTRAVENOUS PRN
Status: DISCONTINUED | OUTPATIENT
Start: 2022-07-22 | End: 2022-07-22 | Stop reason: SDUPTHER

## 2022-07-22 RX ORDER — HYDRALAZINE HYDROCHLORIDE 20 MG/ML
10 INJECTION INTRAMUSCULAR; INTRAVENOUS EVERY 6 HOURS PRN
Status: DISCONTINUED | OUTPATIENT
Start: 2022-07-22 | End: 2022-07-25 | Stop reason: HOSPADM

## 2022-07-22 RX ORDER — HYDRALAZINE HYDROCHLORIDE 20 MG/ML
5 INJECTION INTRAMUSCULAR; INTRAVENOUS
Status: DISCONTINUED | OUTPATIENT
Start: 2022-07-22 | End: 2022-07-22 | Stop reason: HOSPADM

## 2022-07-22 RX ORDER — FENTANYL CITRATE 50 UG/ML
INJECTION, SOLUTION INTRAMUSCULAR; INTRAVENOUS PRN
Status: DISCONTINUED | OUTPATIENT
Start: 2022-07-22 | End: 2022-07-22 | Stop reason: SDUPTHER

## 2022-07-22 RX ORDER — SODIUM CHLORIDE 9 MG/ML
INJECTION, SOLUTION INTRAVENOUS CONTINUOUS PRN
Status: DISCONTINUED | OUTPATIENT
Start: 2022-07-22 | End: 2022-07-22 | Stop reason: SDUPTHER

## 2022-07-22 RX ORDER — ONDANSETRON 2 MG/ML
4 INJECTION INTRAMUSCULAR; INTRAVENOUS
Status: DISCONTINUED | OUTPATIENT
Start: 2022-07-22 | End: 2022-07-22 | Stop reason: HOSPADM

## 2022-07-22 RX ORDER — IPRATROPIUM BROMIDE AND ALBUTEROL SULFATE 2.5; .5 MG/3ML; MG/3ML
1 SOLUTION RESPIRATORY (INHALATION)
Status: DISCONTINUED | OUTPATIENT
Start: 2022-07-22 | End: 2022-07-25 | Stop reason: HOSPADM

## 2022-07-22 RX ADMIN — Medication 100 MCG: at 10:23

## 2022-07-22 RX ADMIN — Medication 100 MCG: at 10:04

## 2022-07-22 RX ADMIN — IPRATROPIUM BROMIDE AND ALBUTEROL SULFATE 1 AMPULE: .5; 2.5 SOLUTION RESPIRATORY (INHALATION) at 20:42

## 2022-07-22 RX ADMIN — Medication 120 MG: at 09:51

## 2022-07-22 RX ADMIN — ONDANSETRON 4 MG: 2 INJECTION INTRAMUSCULAR; INTRAVENOUS at 10:28

## 2022-07-22 RX ADMIN — FENTANYL CITRATE 50 MCG: 50 INJECTION, SOLUTION INTRAMUSCULAR; INTRAVENOUS at 10:13

## 2022-07-22 RX ADMIN — SODIUM CHLORIDE, PRESERVATIVE FREE 10 ML: 5 INJECTION INTRAVENOUS at 12:22

## 2022-07-22 RX ADMIN — AMPICILLIN SODIUM AND SULBACTAM SODIUM 3000 MG: 2; 1 INJECTION, POWDER, FOR SOLUTION INTRAMUSCULAR; INTRAVENOUS at 15:47

## 2022-07-22 RX ADMIN — MORPHINE SULFATE 2 MG: 2 INJECTION, SOLUTION INTRAMUSCULAR; INTRAVENOUS at 21:22

## 2022-07-22 RX ADMIN — AMPICILLIN SODIUM AND SULBACTAM SODIUM 3000 MG: 2; 1 INJECTION, POWDER, FOR SOLUTION INTRAMUSCULAR; INTRAVENOUS at 09:14

## 2022-07-22 RX ADMIN — POTASSIUM CHLORIDE 10 MEQ: 7.46 INJECTION, SOLUTION INTRAVENOUS at 13:07

## 2022-07-22 RX ADMIN — LIDOCAINE HYDROCHLORIDE 100 MG: 20 INJECTION, SOLUTION INTRAVENOUS at 09:50

## 2022-07-22 RX ADMIN — ROCURONIUM BROMIDE 10 MG: 10 SOLUTION INTRAVENOUS at 09:50

## 2022-07-22 RX ADMIN — AMPICILLIN SODIUM AND SULBACTAM SODIUM 3000 MG: 2; 1 INJECTION, POWDER, FOR SOLUTION INTRAMUSCULAR; INTRAVENOUS at 04:57

## 2022-07-22 RX ADMIN — GLUCAGON HYDROCHLORIDE 1 MG: KIT at 07:36

## 2022-07-22 RX ADMIN — HYDRALAZINE HYDROCHLORIDE 10 MG: 20 INJECTION INTRAMUSCULAR; INTRAVENOUS at 12:34

## 2022-07-22 RX ADMIN — DEXAMETHASONE SODIUM PHOSPHATE 10 MG: 10 INJECTION INTRAMUSCULAR; INTRAVENOUS at 10:05

## 2022-07-22 RX ADMIN — MORPHINE SULFATE 4 MG: 4 INJECTION, SOLUTION INTRAMUSCULAR; INTRAVENOUS at 09:26

## 2022-07-22 RX ADMIN — FAMOTIDINE 20 MG: 10 INJECTION, SOLUTION INTRAVENOUS at 21:21

## 2022-07-22 RX ADMIN — Medication 5 ML: at 21:29

## 2022-07-22 RX ADMIN — POTASSIUM CHLORIDE 10 MEQ: 7.46 INJECTION, SOLUTION INTRAVENOUS at 14:29

## 2022-07-22 RX ADMIN — IPRATROPIUM BROMIDE AND ALBUTEROL SULFATE 1 AMPULE: .5; 2.5 SOLUTION RESPIRATORY (INHALATION) at 17:06

## 2022-07-22 RX ADMIN — MIDAZOLAM 2 MG: 1 INJECTION INTRAMUSCULAR; INTRAVENOUS at 09:50

## 2022-07-22 RX ADMIN — PROPOFOL 100 MG: 10 INJECTION, EMULSION INTRAVENOUS at 09:50

## 2022-07-22 RX ADMIN — ROCURONIUM BROMIDE 15 MG: 10 SOLUTION INTRAVENOUS at 10:05

## 2022-07-22 RX ADMIN — SODIUM CHLORIDE: 9 INJECTION, SOLUTION INTRAVENOUS at 00:44

## 2022-07-22 RX ADMIN — Medication 10 ML: at 07:38

## 2022-07-22 RX ADMIN — SODIUM CHLORIDE: 9 INJECTION, SOLUTION INTRAVENOUS at 09:45

## 2022-07-22 RX ADMIN — AMPICILLIN SODIUM AND SULBACTAM SODIUM 3000 MG: 2; 1 INJECTION, POWDER, FOR SOLUTION INTRAMUSCULAR; INTRAVENOUS at 21:29

## 2022-07-22 RX ADMIN — FENTANYL CITRATE 100 MCG: 50 INJECTION, SOLUTION INTRAMUSCULAR; INTRAVENOUS at 09:50

## 2022-07-22 ASSESSMENT — PAIN SCALES - GENERAL
PAINLEVEL_OUTOF10: 0
PAINLEVEL_OUTOF10: 8
PAINLEVEL_OUTOF10: 0
PAINLEVEL_OUTOF10: 0
PAINLEVEL_OUTOF10: 9

## 2022-07-22 ASSESSMENT — LIFESTYLE VARIABLES: SMOKING_STATUS: 1

## 2022-07-22 ASSESSMENT — PAIN DESCRIPTION - DESCRIPTORS: DESCRIPTORS: ACHING

## 2022-07-22 ASSESSMENT — PAIN - FUNCTIONAL ASSESSMENT: PAIN_FUNCTIONAL_ASSESSMENT: ACTIVITIES ARE NOT PREVENTED

## 2022-07-22 ASSESSMENT — ENCOUNTER SYMPTOMS: DYSPNEA ACTIVITY LEVEL: AFTER AMBULATING 1 FLIGHT OF STAIRS

## 2022-07-22 ASSESSMENT — PAIN DESCRIPTION - LOCATION: LOCATION: THROAT;ABDOMEN

## 2022-07-22 NOTE — CARE COORDINATION
7/22/22 Update CM note; He is accepted at [facility] for rehab. He is going under his Medicare part A. He is only 10% service connected and does not have skilled benefits thru the [place] per Wilmer Rico at [facility]. Spoke with Albert Brown at [place] and informed her. Passar completed/ambulance form done/vineet/destination completed. All placed in soft chart.  Electronically signed by Guillaume Adkins RN CM on 7/22/2022 at 3:03 PM

## 2022-07-22 NOTE — ANESTHESIA PRE PROCEDURE
bolus 10% 125 mL  125 mL IntraVENous PRN Lynn Adina, APRN - CNP        Or    dextrose bolus 10% 250 mL  250 mL IntraVENous PRN Lynn Adina, APRN - CNP        glucagon (rDNA) injection 1 mg  1 mg SubCUTAneous PRN Lynn Adina, APRN - CNP   1 mg at 07/22/22 0736    dextrose 10 % infusion   IntraVENous Continuous PRN Lynn Holden, APRN - CNP        0.9 % sodium chloride infusion   IntraVENous PRN Estrellita Bnejamin MD        sodium chloride flush 0.9 % injection 5-40 mL  5-40 mL IntraVENous 2 times per day Estrellita Benjamin MD   10 mL at 07/22/22 0738    sodium chloride flush 0.9 % injection 5-40 mL  5-40 mL IntraVENous PRN Estrellita Benjamin MD        0.9 % sodium chloride infusion   IntraVENous PRN Estrellita Benjamin MD        ondansetron (ZOFRAN-ODT) disintegrating tablet 4 mg  4 mg Oral Q8H PRN Estrellita Bnejamin MD        Or    ondansetron (ZOFRAN) injection 4 mg  4 mg IntraVENous Q6H PRN Estrellita Benjamin MD        morphine (PF) injection 2 mg  2 mg IntraVENous Q2H PRN Estrellita Benjamin MD   2 mg at 07/21/22 2053    Or    morphine sulfate (PF) injection 4 mg  4 mg IntraVENous Q2H PRN Estrellita Benjamin MD   4 mg at 07/21/22 1207    diphenhydrAMINE (BENADRYL) tablet 25 mg  25 mg Oral Q6H PRN Estrellita Benjamin MD        Or   [place] diphenhydrAMINE (BENADRYL) injection 25 mg  25 mg IntraVENous Q6H PRN Estrellita Benjamin MD        bisacodyl (DULCOLAX) suppository 10 mg  10 mg Rectal Daily PRN Estrellita Benjamin MD        fleet rectal enema 1 enema  1 enema Rectal Daily PRN Estrellita Benjamin MD        famotidine (PEPCID) tablet 20 mg  20 mg Oral BID Estrellita Benjamin MD   20 mg at 07/19/22 2120    Or    famotidine (PEPCID) 20 mg in sodium chloride (PF) 10 mL injection  20 mg IntraVENous BID Estrellita Benjamin MD   20 mg at 07/21/22 2053    ampicillin-sulbactam (UNASYN) 3000 mg in 100 mL NS IVPB minibag  3,000 mg IntraVENous Q6H Estrellita Benjamin MD   Stopped at 07/22/22 0601       Allergies:  No Known Allergies    Problem List:    Patient Active Problem List Diagnosis Code    Cervical spinal stenosis M48.02    Cervical stenosis of spinal canal M48.02    Cervical myelopathy (HCC) G95.9    Cervical stenosis of spine M48.02       Past Medical History:        Diagnosis Date    Cancer [facility]) 2013    Prostate       Past Surgical History:        Procedure Laterality Date    CERVICAL FUSION N/A 7/18/2022    ANTERIOR C3-C4, C4-C5,  AND C5-C6 CERVICAL DISCECTOMY AND FUSION performed by Myranda Scruggs MD at [address] [place] [address] N/A 7/18/2022    POSTERIOR C3-C7 LAMINECTOMY, POSTERIOR C3-T1 FUSION performed by Myranda Scruggs MD at [facility] SURGERY N/A 7/18/2022    PHARYNGEAL TEAR REPAIR performed by Myranda Scruggs MD at [address] [place] [address] History:    Social History     Tobacco Use    Smoking status: Every Day     Types: Cigarettes    Smokeless tobacco: Never    Tobacco comments:     1-2 cigarettes a day   Substance Use Topics    Alcohol use: No                                Ready to quit: Not Answered  Counseling given: Not Answered  Tobacco comments: 1-2 cigarettes a day      Vital Signs (Current):   Vitals:    07/21/22 2123 07/22/22 0009 07/22/22 0030 07/22/22 0400   BP:  (!) 182/101 (!) 164/89 (!) 148/93   Pulse:  81  82   Resp: 16 16     Temp:  36.9 °C (98.5 °F)  36.8 °C (98.2 °F)   TempSrc:  Temporal  Temporal   SpO2:  97%  95%   Weight:       Height:                                                  BP Readings from Last 3 Encounters:   07/22/22 (!) 148/93   07/13/22 115/77   05/04/22 138/88       NPO Status: Time of last liquid consumption: 1900                        Time of last solid consumption: 1830                        Date of last liquid consumption: 07/17/22                        Date of last solid food consumption: 07/17/22    BMI:   Wt Readings from Last 3 Encounters:   07/18/22 128 lb (58.1 kg)   07/13/22 127 lb (57.6 kg)   05/04/22 130 lb (59 kg)     Body mass index is 18.9 kg/m².     CBC:   Lab Results   Component Value Date/Time    WBC 8.2 07/22/2022 05:41 AM    RBC 4.08 07/22/2022 05:41 AM    HGB 11.3 07/22/2022 05:41 AM    HCT 33.1 07/22/2022 05:41 AM    MCV 81.1 07/22/2022 05:41 AM    RDW 15.4 07/22/2022 05:41 AM     07/22/2022 05:41 AM       CMP:   Lab Results   Component Value Date/Time     07/22/2022 05:41 AM    K 3.2 07/22/2022 05:41 AM    K 4.0 07/13/2022 09:45 AM     07/22/2022 05:41 AM    CO2 23 07/22/2022 05:41 AM    BUN 14 07/22/2022 05:41 AM    CREATININE 0.6 07/22/2022 05:41 AM    GFRAA >60 07/22/2022 05:41 AM    LABGLOM >60 07/22/2022 05:41 AM    GLUCOSE 83 07/22/2022 05:41 AM    PROT 6.7 07/21/2022 07:16 AM    CALCIUM 8.4 07/22/2022 05:41 AM    BILITOT 2.2 07/21/2022 07:16 AM    ALKPHOS 56 07/21/2022 07:16 AM    AST 34 07/21/2022 07:16 AM    ALT 23 07/21/2022 07:16 AM       POC Tests: No results for input(s): POCGLU, POCNA, POCK, POCCL, POCBUN, POCHEMO, POCHCT in the last 72 hours. Coags:   Lab Results   Component Value Date/Time    PROTIME 11.7 07/13/2022 09:45 AM    INR 1.1 07/13/2022 09:45 AM    APTT 30.0 09/09/2020 01:35 PM       HCG (If Applicable): No results found for: PREGTESTUR, PREGSERUM, HCG, HCGQUANT     ABGs: No results found for: PHART, PO2ART, AJP6OOK, RRD9OJI, BEART, C8HJGKQS     Type & Screen (If Applicable):  No results found for: LABABO, LABRH    Drug/Infectious Status (If Applicable):  No results found for: HIV, HEPCAB    COVID-19 Screening (If Applicable): No results found for: COVID19    EKG 7/13/22  Narrative & Impression    Normal sinus rhythm  Normal ECG  When compared with ECG of 11-NOV-2021 01:13,  No significant change was found  Confirmed by Viri Mart ([id number]) on 7/14/2022 4:43:25 PM     Chest Xray 7/21/22    Impression   No acute cardiopulmonary process.  No evidence of aspiration pneumonia. C Spine MRI 4/20/22  Impression   1.  Large left paracentral disc protrusion at C4-5, with near complete   effacement of the central canal.  It impinges the cord, resulting in mild   CORD EDEMA. 2. Myelomalacia in the cord at the right C5-6 and midline C6 levels. 3. SEVERE CENTRAL CANAL STENOSES at C3-4, C4-5 and C6-7 (worst at C4-5). Moderate stenosis at C5-6.   4.  Multilevel neural foraminal stenoses, worst (severe) at the bilateral   C3-4, left C4-5 and bilateral C6-7 levels.       RECOMMENDATIONS:   Unavailable               Anesthesia Evaluation  Patient summary reviewed and Nursing notes reviewed no history of anesthetic complications:   Airway: Mallampati: III  TM distance: >3 FB   Neck ROM: limited  Comment: c collar  Mouth opening: < 3 FB   Dental:          Pulmonary:   (+) decreased breath sounds current smoker                           Cardiovascular:    (+) hypertension:, DIXON: after ambulating 1 flight of stairs, hyperlipidemia                  Neuro/Psych:                ROS comment: S/p cervical fusion with possible pharyngeal tear; patient has hoarseness and pooling of secretions GI/Hepatic/Renal:   (+) GERD:, hepatitis:,           Endo/Other:    (+) electrolyte abnormalities ( K 3.2), malignancy/cancer ( prostate). Abdominal:             Vascular: negative vascular ROS. Other Findings:           Anesthesia Plan      general     ASA 3       Induction: intravenous. MIPS: Postoperative opioids intended and Prophylactic antiemetics administered. Anesthetic plan and risks discussed with patient. Use of blood products discussed with patient whom consented to blood products. Plan discussed with CRNA and attending. Mary Douglass RN   7/22/2022  Chart reviewed pt id agree with plan REBECCA Hess - CRNA    DOS STAFF ADDENDUM:    Patient seen and chart reviewed. Physical exam and history updated as indicated. NPO status confirmed. Anesthesia options and plan discussed including risks benefits with patient/legal guardian and family as available. Concerns and questions addressed. Consent verbalized to proceed.   Anesthesia plan, options and intraoperative/postoperative concerns discussed with care team.    Lilo Marcelo MD, MD  7/22/2022  10:35 AM

## 2022-07-22 NOTE — CONSULTS
GENERAL SURGERY  CONSULT NOTE  7/21/2022    Physician Consulted: Dr. Jennifer Marie  Reason for Consult: gastrostomy tube  Referring Physician: Dr. Amadeo Scott    JANES Daily is a 75 y.o. male who presents to the general surgery service for evaluation of enteral access. The patient is s/p C3-6 ACDF and C3-T1 PCF on 7/18. A pharyngeal injury occurred, which was repaired at the time of the procedure. Today, the patient underwent MBSS, with recommendations for strict NPO. He has had no prior abdominal surgery. He is not receiving any blood thinning medications. ID is following and currently prescribing Unasyn. Past Medical History:   Diagnosis Date    Cancer ([facility] [address] 2013    Prostate       Past Surgical History:   Procedure Laterality Date    CERVICAL FUSION N/A 7/18/2022    ANTERIOR C3-C4, C4-C5,  AND C5-C6 CERVICAL DISCECTOMY AND FUSION performed by Kinga Recio MD at [address] [place] [address] N/A 7/18/2022    POSTERIOR C3-C7 LAMINECTOMY, POSTERIOR C3-T1 FUSION performed by Kinga Recio MD at [address] [place] [address] N/A 7/18/2022    PHARYNGEAL TEAR REPAIR performed by Kinga Recio MD at [facility] OR       Medications Prior to Admission:    Prior to Admission medications    Medication Sig Start Date End Date Taking? Authorizing Provider   lisinopril (PRINIVIL;ZESTRIL) 2.5 MG tablet Take 2.5 mg by mouth daily    Historical Provider, MD   amLODIPine (NORVASC) 10 MG tablet Take 10 mg by mouth daily    Historical Provider, MD   Cholecalciferol (VITAMIN D) 50 MCG (2000 UT) CAPS capsule Take by mouth Takes at noon    Historical Provider, MD   docusate (COLACE, DULCOLAX) 100 MG CAPS TAKE ONE CAPSULE BY MOUTH TWICE A DAY 11/17/21   Historical Provider, MD   sildenafil (VIAGRA) 100 MG tablet TAKE ONE TABLET BY MOUTH AN HOUR_BEFORE SEX.  ONE HOUR BEFORE SEXUAL ACTIVITY (NO MORE THAN 1 DOSE PER 24 HOURS) NO NITRATES 6/25/21   Historical Provider, MD   tamsulosin (FLOMAX) 0.4 MG capsule TAKE ONE CAPSULE BY MOUTH AT BEDTIME 9/30/21   Historical Provider, MD   ibuprofen (IBU) 800 MG tablet Take 1 tablet by mouth every 8 hours as needed for Pain Take with food. 8/5/20   REBECCA Delgadillo - NP       No Known Allergies    History reviewed. No pertinent family history. Social History     Tobacco Use    Smoking status: Every Day     Types: Cigarettes    Smokeless tobacco: Never    Tobacco comments:     1-2 cigarettes a day   Vaping Use    Vaping Use: Never used   Substance Use Topics    Alcohol use: No    Drug use: No         Review of Systems   Constitutional:  Negative for appetite change, chills, fatigue and fever. HENT:  Negative for facial swelling and trouble swallowing. Respiratory:  Negative for cough and shortness of breath. Cardiovascular:  Negative for chest pain and palpitations. Gastrointestinal:  Negative for abdominal distention, abdominal pain, constipation, diarrhea, nausea and vomiting. Endocrine: Negative for polydipsia and polyphagia. Genitourinary:  Negative for difficulty urinating and dysuria. Musculoskeletal:  Positive for neck pain and neck stiffness. Skin:  Negative for color change and rash. Neurological:  Positive for speech difficulty. Negative for dizziness and weakness. Psychiatric/Behavioral:  Negative for agitation, behavioral problems and confusion. PHYSICAL EXAM:    Vitals:    07/21/22 1459   BP: (!) 158/89   Pulse: 79   Resp: 18   Temp: 97.8 °F (36.6 °C)   SpO2: 98%       General Appearance:  awake, alert, oriented, in no acute distress  Skin:  Skin color, texture, turgor normal. No rashes or lesions. Head/face:  NCAT. Cervical collar present  Eyes:  No gross abnormalities. Sclera nonicteric  Lungs/Chest:  Normal expansion. No respiratory distress. On room air. No chest wall tenderness  Heart: Warm throughout. Regular rate   Abdomen:  Soft, minimal tenderness, non distended. No palpable organomegaly or masses.  No appreciable surgical scars  Extremities: Extremities warm to touch, with no edema. LABS:    CBC  Recent Labs     07/21/22  0716   WBC 12.7*   HGB 11.4*   HCT 33.9*        BMP  Recent Labs     07/21/22  0716      K 3.6      CO2 23   BUN 12   CREATININE 0.6*   CALCIUM 8.4*     Liver Function  Recent Labs     07/21/22  0716   BILITOT 2.2*   AST 34   ALT 23   ALKPHOS 56   PROT 6.7   LABALBU 3.3*     No results for input(s): LACTATE in the last 72 hours. No results for input(s): INR, PTT in the last 72 hours. Invalid input(s): PT    RADIOLOGY    I have personally reviewed all relevant labs and imaging. No recent abdominal imaging      ASSESSMENT:  75 y.o. male with dysphagia following cervical fusion with repair of pharyngeal injury on 7/18. In need of enteral access    PLAN:  Laparoscopic, possible open gastrostomy tube placement. Will plan for procedure 7/22  NPO  Antibiotics per ID    The risks, benefits, alternatives, and potential complications of the procedure, including the risks of bleeding, infection, injury to surrounding structures, need for additional procedures, and death were explained to the patient. All questions were answered. The patient understands and agrees to proceed with the procedure.      Discussed with Dr. Henna Mcelroy    Electronically signed by Gabriella Guzman DO on 7/21/22 at 8:25 PM EDT

## 2022-07-22 NOTE — PROGRESS NOTES
Pt bp elevated, Medicated for pain. Provider made aware and orders received for PRN Hydralazine. Pt off floor at this time for PEG tube placement.

## 2022-07-22 NOTE — ANESTHESIA POSTPROCEDURE EVALUATION
Department of Anesthesiology  Postprocedure Note    Patient: Jarred Evans  MRN: [id number]  YOB: 1955  Date of evaluation: 7/22/2022      Procedure Summary     Date: 07/22/22 Room / Location: [facility] OR 09 / [facility]    Anesthesia Start: 3331 Anesthesia Stop: 5539    Procedure: LAPAROSCOPIC POSSIBLE OPEN PLACEMENT OF GASTROSTOMY TUBE WANTS AS EARLY AS POSSIBLE TO FOLLOW OTHER ADDONS (Abdomen) Diagnosis:       Dysphagia, unspecified type      (Dysphagia, unspecified type)    Surgeons: Roberto Nelson MD Responsible Provider: Nick Guadarrama MD    Anesthesia Type: general ASA Status: 3          Anesthesia Type: No value filed.     Jorge Phase I: Jorge Score: 5    Jorge Phase II: Jorge Score: 8      Anesthesia Post Evaluation    Patient location during evaluation: PACU  Patient participation: complete - patient participated  Level of consciousness: awake and alert  Airway patency: patent  Nausea & Vomiting: no nausea and no vomiting  Complications: no  Cardiovascular status: blood pressure returned to baseline and hemodynamically stable  Respiratory status: acceptable and spontaneous ventilation  Hydration status: euvolemic  Multimodal analgesia pain management approach

## 2022-07-22 NOTE — PROGRESS NOTES
routine  Functional transfer/mobility training/DME recommendations for increased independence, safety, and fall prevention  Patient/Family education to increase follow through with safety techniques and functional independence  Recommendation of environmental modifications for increased safety with functional transfers/mobility and ADLs  Splinting/positioning for increased function, prevention of contractures, and improve skin integrity  Therapeutic exercise to improve motor endurance, ROM, and functional strength for ADLs/functional transfers  Therapeutic activities to facilitate/challenge dynamic balance, stand tolerance for increased safety and independence with ADLs  Therapeutic activities to facilitate gross/fine motor skills for increased independence with ADLs  Positioning to improve skin integrity, interaction with environment and functional independence     Recommended Adaptive Equipment:  TBD in Rehab     Home Living: Pt lives with god-sister in a 3 story with 4 steps to enter with no HR. Bed on 1st, Bath on 2nd level.   Bathroom setup: tub/shower, low toilet   Equipment owned: spc, w/c     Prior Level of Function: Pt. States recently had assist with ADLs & with IADLs; ambulated spc short distances, w/c longer distances  Driving: relies on family  Occupation: retired, tree cutting     Pain Level: Pt complained of neck pain this session  Cognition: A&O: 4/4; Follows 2 step directions              Memory:  F+              Sequencing:  F              Problem solving:  F              Judgement/safety:  F                Functional Assessment:  AM-PAC Daily Activity Raw Score: 12/24    Initial Eval Status  Date: 7-19-22 Treatment Status  Date: 7/22/22 STGs = LTGs  Time frame: 10-14 days   Feeding Min A with suction tube to mouth using R hand  NPO Set up   Grooming Max A with oral care Ginny  Pt washed face, applied deodorant, assistance to remove neck brace and wash neck Stand by Assist    UB Dressing Dep modA  [facility] gown seated EOB  Stand by Assist    LB Dressing Dep  Dependent  [facility] socks, attempting cross over technique, pt unable Minimal Assist    Bathing Dep  maxA  Simulated Task    Pt able to wash of UB, assistance to wash of LB, buttocks and lavon area    Pt denying this session Minimal Assist    Toileting Dep  Dependent  Pt incontinent of urine upon arrival, assistance to stand and complete of hyegine Minimal Assist    Bed Mobility  Logroll: Min A  Supine to sit: Mod A  Sit to supine: Mod A Ginny  Supine<>EOB    Log roll technique, assistance with trunk Supine to sit: Supervision   Sit to supine: Supervision    Functional Transfers Mod A with sit <> stand, use of ww  modA  Sit to Stand  Stand to Sit    Max cueing for hand placement   Stand by Assist    Functional Mobility Mod A with few side steps towards HOB with ww  modA  Pt ambulated short household distance in room with w.w, slow pace Stand by Assist    Balance Sitting:    Static:  Min A/SBA    Dynamic:Min A  Standing: Mod A  Sitting EOB:  CGA    Standing:  modA     Activity Tolerance P+/F- Fair-  F+   Visual/  Perceptual Glasses: yes          Vitals spO2 & HR   WFL      Hand Dominance R    AROM (PROM) Strength Additional Info:   RUE  R shld. to 70  Elbow WFL  Wrist 50%  Hand WFL Grossly 3+/5 F+  and Fair FMC/dexterity noted during ADL tasks      LUE L shld. Minimal d/t chronic RC injury  L elbow 30-90  L wrist minimal  L hand, functional , only 25% ext. Grossly 2+/3- F-  and Poor FMC/dexterity noted during ADL tasks. Noted L swan neck deformities         Hearing: WFL  Sensation:  No c/o numbness or tingling B hands  Tone: WFL  Edema: none noted    Education:  Pt was educated on role of OT, goals to be reached, importance of OOB activity, precautions to follow, safety and hand placement with transfers, safety and walker management with functional mobility, and techniques to assist with UB/LB dressing task. Comments: Upon arrival pt supine in bed, agreeable to therapy with encouragement, nursing okaying pt to be seen this session. Pt being agitated throughout entire session, having poor activity tolerance, continuing to repeat comments in regards to not being able to eat food, being hungry, stating of having hunger pains in stomach,and being weak from not eating, requesting food,education pt on being NPO at this time, pt not understanding, nursing notified to provide further explanation. At end of session, pt seated upright in chair, all lines and tubes intact, call light within reach. Pt has made fair progress towards set goals.    Continue with current plan of care focusing on increasing of independency with transfers and ADL tasks      Treatment Time In: 1:45pm           Treatment Time Out: 2:08pm              Treatment Charges: Mins Units   Ther Ex  [id number]     Manual Therapy [id number]     Thera Activities [id number] 15 1   ADL/Home Mgt [id number] 8 1   Neuro Re-ed [id number]     Group Therapy      Orthotic manage/training  [id number]     Non-Billable Time     Total Timed Treatment 23 2        Tierra OSBORNE/L [id number]

## 2022-07-22 NOTE — PROGRESS NOTES
Dietitian up to see pt, waiting for updated weight to write orders for tube feed. Pt in chair at bedside, When he gets back in bed we will collect weight.

## 2022-07-22 NOTE — CONSULTS
Comprehensive Nutrition Assessment    Type and Reason for Visit:  Initial, Consult (TF order and management)    Nutrition Recommendations/Plan:   Continue NPO. Wt received ; tube feed ordered. Standard w/ Fiber (Jevity 1.5) @60ml/hr to provide 1440ml TV, 2160 kcals, 92g pro, 1094ml free water. Flush of 150 ml q 4 to provide 900ml water (flush + US=3309ob total water). This meets 100% of pt's est needs. Note; pt at risk for refeeding syndrome; start TF at a low rate and advance slowly. Thank you for acquiring the bedscale wt.  will continue to monitor. Malnutrition Assessment:  Malnutrition Status:  Severe malnutrition (07/22/22 1412)    Context:  Chronic Illness (weakness/pain ~1 year)     Findings of the 6 clinical characteristics of malnutrition:  Energy Intake:  75% or less estimated energy requirements for 1 month or longer  Weight Loss:  Mild weight loss (specify amount and time period) (7/22/#, 3/24/# equates to ~8% wt loss in ~4 mo (avg of ~6% in ~3 mo))     Body Fat Loss:   (moderate) Orbital, Triceps, Buccal region   Muscle Mass Loss:  Severe muscle mass loss Clavicles (pectoralis & deltoids), Thigh (quadraceps), Calf (gastrocnemius)  Fluid Accumulation:  No significant fluid accumulation     Strength:  Not Performed    Nutrition Assessment:    pt adm d/t neck pain/myelopathy and cervical stenosis; s/p cervical fusion/laminectomy 7/18; pt was w/ pharyngeal tear and per SLP pt w/ pharyngeal phase dysphagia 7/21- they rec NPO; s/p PEG tube placement 7/22; PMhx of CA ([facility]; 2013); pt meets criteria for severe malnutrition; will provide TF recs and order; note pt at risk for refeeding syndrome- start at a low rate and advance slowly; will monitor.     Nutrition Related Findings:    muscle/fat wasting; -I/O; responds to voice; oriented x 4; abd WNL; PEG tube; no edema; MAP WNL Wound Type: Surgical Incision (x3)       Current Nutrition Intake & Therapies:    Average Meal Intake: NPO  Average Supplements Intake: NPO  Diet NPO Exceptions are: Sips of Water with Meds  ADULT TUBE FEEDING; PEG; Standard with Fiber; Continuous; 10; Yes; 10; Q 6 hours; 60; 150; Q 4 hours    Anthropometric Measures:  Height: 5' 9\" (175.3 cm)  Ideal Body Weight (IBW): 160 lbs (73 kg)    Admission Body Weight: 125 lb 14.4 oz (57.1 kg) (7/22-BS/actual)  Current Body Weight: 125 lb 14.4 oz (57.1 kg) (7/22-BS), 78.7 % IBW. Current BMI (kg/m2): 18.6  Usual Body Weight: 136 lb 8 oz (61.9 kg) (3/24/22-actual)  % Weight Change (Calculated): -7.8  Weight Adjustment For: No Adjustment                 BMI Categories: Underweight (BMI less than 22) age over 72    Estimated Daily Nutrient Needs:  Energy Requirements Based On: Kcal/kg  Weight Used for Energy Requirements: Current  Energy (kcal/day): 33-35kcal/hulXGV=8069-8251  Weight Used for Protein Requirements: Current  Protein (g/day): 1.5-1.8g/kgxCBW=90-105g  Method Used for Fluid Requirements: 1 ml/kcal  Fluid (ml/day): 3151-5282    Nutrition Diagnosis:   Severe malnutrition, In context of chronic illness related to pain (pain/weakness ongoing for ~1 yr) as evidenced by Criteria as identified in malnutrition assessment    Nutrition Interventions:   Food and/or Nutrient Delivery: Continue NPO, Start Tube Feeding (Recommend Standard w/ Fiber (Jevity 1.5) @60ml/hr to provide 1440ml TV, 2160 kcals, 92g pro, 1094ml free water; flush of 150 ml q 4 to provide 900ml water (flush + QA=9364og total water). )  Nutrition Education/Counseling: Education not indicated  Coordination of Nutrition Care: Continue to monitor while inpatient       Goals:     Goals: Initiate nutrition support       Nutrition Monitoring and Evaluation:   Behavioral-Environmental Outcomes: None Identified  Food/Nutrient Intake Outcomes: Enteral Nutrition Intake/Tolerance  Physical Signs/Symptoms Outcomes: Biochemical Data, Nutrition Focused Physical Findings, Skin, Chewing or Swallowing, Weight, GI Status, Fluid Status or Edema, Hemodynamic Status    Discharge Planning:     Too soon to determine     Kathrine Megha, RD  Contact: [phone]

## 2022-07-22 NOTE — CARE COORDINATION
7/22/22 Update CM note: spoke with patient at the bedside regarding the inability for him to go to the ARU at [facility]. He states he understands and would like to stay close to the [facility] if able. [facility] and they are unable to accept due to staffing issues with Covid outbreak. Referral sent to Adis Santacruz at [facility] as [address] [place] [address] Dr healthcare accepts [place] patients. Will follow.  Electronically signed by Elizabeth Singh RN CM on 7/22/2022 at 1:43 PM

## 2022-07-22 NOTE — PROGRESS NOTES
[address] [place] [address] [facility]  Progress Note    Chief complaint: Numbness in the upper extremities and difficulty ambulating      SUBJECTIVE:  Patient is tolerating medications. No reported adverse drug reactions. No nausea, vomiting, diarrhea. Afebrile alert  Had peg placed today  Instructed on IS, he was unable to take deep enough breath to move the ball. He did bring up secretions  Review of systems:  As stated above in the chief complaint, otherwise negative. Medications:  Scheduled Meds:   potassium chloride  10 mEq IntraVENous Once    potassium chloride  10 mEq IntraVENous Once    sodium chloride flush  5-40 mL IntraVENous 2 times per day    famotidine  20 mg Oral BID    Or    famotidine (PEPCID) injection  20 mg IntraVENous BID    ampicillin-sulbactam  3,000 mg IntraVENous Q6H     Continuous Infusions:   sodium chloride 50 mL/hr at 22 1223    dextrose      sodium chloride      sodium chloride       PRN Meds:hydrALAZINE, glucose, dextrose bolus **OR** dextrose bolus, glucagon (rDNA), dextrose, sodium chloride, sodium chloride flush, sodium chloride, ondansetron **OR** ondansetron, morphine **OR** morphine, diphenhydrAMINE **OR** diphenhydrAMINE, bisacodyl, fleet    OBJECTIVE:  BP (!) 155/110   Pulse 80   Temp 97.3 °F (36.3 °C) (Temporal)   Resp 14   Ht 5' 9\" (1.753 m)   Wt 128 lb (58.1 kg)   SpO2 94%   BMI 18.90 kg/m²   Temp  Av.9 °F (36.6 °C)  Min: 97.3 °F (36.3 °C)  Max: 98.6 °F (37 °C)  Constitutional: The patient is awake, alert, and oriented. Skin: Warm and dry. No rashes were noted. HEENT: Round and reactive pupils. Moist mucous membranes. No ulcerations or thrush. Neck: Soft,  Chest: No use of accessory muscles to breathe. Symmetrical expansion. No wheezing, crackles , + rhonchi upper  Cardiovascular: S1 and S2 are rhythmic and regular. No murmurs appreciated. Abdomen: Positive bowel sounds to auscultation. Benign to palpation. No masses felt.  No hepatosplenomegaly. peg  Genitourinary: Male  Extremities: No clubbing, no cyanosis, no edema.   Lines: peripheral    Laboratory and Tests Review:  Lab Results   Component Value Date    WBC 8.2 07/22/2022    WBC 12.7 (H) 07/21/2022    WBC 12.7 (H) 07/20/2022    HGB 11.3 (L) 07/22/2022    HCT 33.1 (L) 07/22/2022    MCV 81.1 07/22/2022     07/22/2022     Lab Results   Component Value Date    NEUTROABS 10.74 (H) 07/21/2022    NEUTROABS 10.91 (H) 07/20/2022    NEUTROABS 2.44 07/13/2022     No results found for: CRPHS  Lab Results   Component Value Date    ALT 23 07/21/2022    AST 34 07/21/2022    ALKPHOS 56 07/21/2022    BILITOT 2.2 (H) 07/21/2022     Lab Results   Component Value Date/Time     07/22/2022 05:41 AM    K 3.2 07/22/2022 05:41 AM    K 4.0 07/13/2022 09:45 AM     07/22/2022 05:41 AM    CO2 23 07/22/2022 05:41 AM    BUN 14 07/22/2022 05:41 AM    CREATININE 0.6 07/22/2022 05:41 AM    CREATININE 0.6 07/21/2022 07:16 AM    CREATININE 0.8 07/20/2022 06:17 AM    GFRAA >60 07/22/2022 05:41 AM    LABGLOM >60 07/22/2022 05:41 AM    GLUCOSE 83 07/22/2022 05:41 AM    PROT 6.7 07/21/2022 07:16 AM    LABALBU 3.3 07/21/2022 07:16 AM    CALCIUM 8.4 07/22/2022 05:41 AM    BILITOT 2.2 07/21/2022 07:16 AM    ALKPHOS 56 07/21/2022 07:16 AM    AST 34 07/21/2022 07:16 AM    ALT 23 07/21/2022 07:16 AM     No results found for: CRP  No results found for: [address]  Radiology:      Microbiology:   No results found for: BC, ORG  No results found for: Ian Moran, [facility]  No results found for: WNDABS  No results found for: RESPSMEAR  No results found for: Katherine Qualia, LABLEGI, AFBCX, FUNGSM, LABFUNG  No results found for: CULTRESP  No results found for: CXCATHTIP  No results found for: BFCS  No results found for: CXSURG  Urine Culture, Routine   Date Value Ref Range Status   07/18/2022 Growth not present  Final     MRSA Culture Only   Date Value Ref Range Status   07/13/2022 Methicillin resistant Staph aureus not isolated  Final       ASSESSMENT:  Status post cervical fusion with a pharyngeal/esophageal tear  Patient is a high risk for aspiration as noted also only by his physical exam and upper respiratory secretions and wheezing but the fact that he can even move the inspirometer muriel    PLAN:  Continue Unasyn day 4/10 days  Check final cultures  Chest pt and aerosols to help keep airway clear and prevent pneumonia  Monitor labs    REBECCA Phan CNP  12:51 PM  7/22/2022  Pt seen and examined. Above discussed agree with advanced practice nurse. Labs, cultures, and radiographs reviewed. Face to Face encounter occurred. Changes made as necessary.      Tamia Powell MD

## 2022-07-22 NOTE — PROGRESS NOTES
Department of Neurosurgery  Progress Note    CHIEF COMPLAINT: s/p anterior/posterior cervical fusion    SUBJECTIVE:  resting peacefully. Plan for PEG today. REVIEW OF SYSTEMS :  Constitutional: Negative for chills and fever. Neurological: Negative for dizziness, tremors and speech change.      OBJECTIVE:   VITALS:  BP (!) 180/102   Pulse 86   Temp 97.6 °F (36.4 °C) (Temporal)   Resp 16   Ht 5' 9\" (1.753 m)   Wt 128 lb (58.1 kg)   SpO2 95%   BMI 18.90 kg/m²   PHYSICAL:  CONSTITUTIONAL:  awake, alert, cooperative, no apparent distress, and appears stated age    DATA:  CBC:   Lab Results   Component Value Date/Time    WBC 8.2 07/22/2022 05:41 AM    RBC 4.08 07/22/2022 05:41 AM    HGB 11.3 07/22/2022 05:41 AM    HCT 33.1 07/22/2022 05:41 AM    MCV 81.1 07/22/2022 05:41 AM    MCH 27.7 07/22/2022 05:41 AM    MCHC 34.1 07/22/2022 05:41 AM    RDW 15.4 07/22/2022 05:41 AM     07/22/2022 05:41 AM    MPV 9.8 07/22/2022 05:41 AM     BMP:    Lab Results   Component Value Date/Time     07/22/2022 05:41 AM    K 3.2 07/22/2022 05:41 AM    K 4.0 07/13/2022 09:45 AM     07/22/2022 05:41 AM    CO2 23 07/22/2022 05:41 AM    BUN 14 07/22/2022 05:41 AM    LABALBU 3.3 07/21/2022 07:16 AM    CREATININE 0.6 07/22/2022 05:41 AM    CALCIUM 8.4 07/22/2022 05:41 AM    GFRAA >60 07/22/2022 05:41 AM    LABGLOM >60 07/22/2022 05:41 AM    GLUCOSE 83 07/22/2022 05:41 AM     PT/INR:    Lab Results   Component Value Date/Time    PROTIME 11.7 07/13/2022 09:45 AM    INR 1.1 07/13/2022 09:45 AM     PTT:    Lab Results   Component Value Date/Time    APTT 30.0 09/09/2020 01:35 PM   [APTT}    Current Inpatient Medications  Current Facility-Administered Medications: potassium chloride 10 mEq/100 mL IVPB (Peripheral Line), 10 mEq, IntraVENous, Once  potassium chloride 10 mEq/100 mL IVPB (Peripheral Line), 10 mEq, IntraVENous, Once  0.9 % sodium chloride infusion, , IntraVENous, Continuous  glucose chewable tablet 16 g, 4 tablet,

## 2022-07-22 NOTE — PROGRESS NOTES
12 step(s) with 1 rail(s) Heather   Wheelchair mobility NT  feet Independent   ROM BUE:  See OT note  BLE:  WFL     Strength BUE:  See OT note  RLE:  Grossly 5/5  LLE:  Grossly 3+/5     Balance Sitting EOB:  Bob  Dynamic Standing:  ModA with [address] Sitting EOB:  SBA  Dynamic Standing:  ModA with [address] Sitting EOB:  Independent  Dynamic Standing:  Heather with AAD     Pt is A & O x 4  Sensation:  No reports of numbness/tingling to extremities  Edema:  Unremarkable    Patient education  Pt educated on safety during functional mobility, use of call light for assistance. Patient response to education:   Pt verbalized understanding Pt demonstrated skill Pt requires further education in this area   Yes Yes Yes     ASSESSMENT:    Comments:  Session cleared by nursing. Patient in semi-Peter's position with cervical collar donned upon arrival; agreeable to PT session with OT collaboration. Required increased time, assistance of trunk to perform bed mobility via log roll technique. Patient sat EOB for extended period of time. Required increased time, verbal cues related to positioning/sequencing to ensure safety during functional transfers. Exhibited flexed forward standing posture, requiring verbal cues to address. Upon initial stand, static standing performed for ~1 minute while hygiene was performed as patient noted to be incontinent of urine. Ambulated from bed to chair with decreased speed and unsteadiness. Declined to perform additional ambulation due to pain. Performed two sit <> stand transfers from chair; upon first stand, static standing performed for ~1 minute. Patient left sitting in bedside chair with cervical collar donned, call light in reach. Instructed not to get up on own and to use call light for assistance; verbalized understanding.     Treatment:  Patient practiced and was instructed in the following treatment:    Bed mobility - verbal cues to facilitate proper positioning and sequencing regarding log roll technique; physical assistance provided as needed during activity  Static sitting - performed to promote upright tolerance and balance maintenance  Functional transfers - performed several functional transfers; verbal cues to facilitate proper positioning and sequencing, particularly related to hand/foot placement; physical assistance provided as needed during activity  Static standing - performed to promote activity tolerance and balance maintenance  Ambulation - verbal cues to facilitate proper positioning; physical assistance provided as needed during activity    PLAN:    Patient is making good progress towards established goals. Will continue with current POC.       Time in  1355  Time out  1418    Total Treatment Time  23 minutes     CPT codes:  [] Gait training 44559 0 minutes  [] Manual therapy 94035 0 minutes  [x] Therapeutic activities 93651 23 minutes  [] Therapeutic exercises 32468 0 minutes  [] Neuromuscular reeducation 08368 0 minutes    Gene Gómez PT, DPT  [id number]

## 2022-07-22 NOTE — OP NOTE
Operative Note      Patient: Man Contreras  YOB: 1955  MRN: [id number]    Date of Procedure: 7/22/2022    Pre-Op Diagnosis: Dysphagia, unspecified type    Post-Op Diagnosis: Same       Procedure(s):  LAPAROSCOPIC 22F GASTROSTOMY TUBE PLACEMENT    Surgeon(s):  Riki Ch MD    Assistant:   Resident: Dave Garcia MD    Anesthesia: General    Estimated Blood Loss (mL): Minimal    Complications: None    Specimens:   * No specimens in log *    Implants:  * No implants in log *      Drains:   Closed/Suction Drain Posterior Neck Accordion (Active)   Site Description Clean, dry & intact 07/20/22 2316   Dressing Status Clean, dry & intact 07/21/22 2000   Drainage Appearance Bloody 07/21/22 2000   Drain Status Compressed 07/21/22 2000   Output (ml) 10 ml 07/21/22 2000       Gastrostomy/Enterostomy/Jejunostomy Tube Gastrostomy LUQ 1 22 fr (Active)       [REMOVED] Urinary Catheter Miranda (Removed)   $ Urethral catheter insertion Inserted for procedure 07/18/22 1336   Catheter Indications Perioperative use for selected surgical procedures 07/20/22 1515   Site Assessment No urethral drainage 07/20/22 1515   Urine Color Vandana 07/20/22 1515   Urine Appearance Hazy 07/20/22 1515   Collection Container Standard 07/20/22 1515   Securement Method Securing device (Describe) 07/20/22 1515   Catheter Care Completed Yes 07/19/22 1955   Catheter Best Practices  Drainage tube clipped to bed;Catheter secured to thigh; Bag below bladder;Drainage bag less than half full;Lack of dependent loop in tubing;Bag not on floor; Tamper seal intact 07/20/22 1515   Status Draining;Patent 07/20/22 1515   Output (mL) 575 mL 07/20/22 0830   Discontinuation Reason Per provider order 07/20/22 0830       Findings: Uncomplicated laparoscopic placement of 25 Greek G-tube    Detailed Description of Procedure:     BRIEF HISTORY: The patient presented with pharyngeal injury during C3-6 ACDF who underwent modified barium swallow which within the stomach and the balloon inflated. The 12mm trocar site was then closed using 2-0 Vicryl with the Justin-Nany device. The abdomen was then desufflated and the G-tube was brought up to the anterior abdominal wall and fascial sutures were sutured down in place. The G-tube was noted to be 2 cm at the skin. The laparoscopic incision sites were closed with 4-0 Vicryl suture and covered with skin glue. A zip tie was placed around the G-tube to hold the bumper in place. Needle, sponge, and instrument counts were reported as correct x2. The patient tolerated the procedure well without complications and was transferred to the recovery area in good condition. Dr. Chris Wilks was present and scrubbed throughout the case.         Electronically signed by Oscar Olson MD on 7/22/2022 at 10:42 AM

## 2022-07-22 NOTE — PROGRESS NOTES
Patient is scheduled for PEG tube insertion today. We will reassess patient first thing on Monday morning. He will need updated therapy evaluations either Sunday or Monday. If patient is medically cleared on Monday we will fax updated clinicals to the [place] RN Coordinator, Scooby Topete to obtain authorization to admit to [facility]. Will continue to follow.     Darrius Apodaca RN, Pre-screener for Acute Rehab  P: [phone]

## 2022-07-22 NOTE — DISCHARGE INSTR - COC
Continuity of Care Form    Patient Name: Jarred Evans   :  1955  MRN:  [id number]    Admit date:  2022  Discharge date:  2022    Code Status Order: Prior   Advance Directives:   [facility] Documentation       Date/Time Healthcare Directive Type of Healthcare Directive Copy in [facility] [address] Agent's Name Healthcare Agent's Phone Number    22 0532 No, patient does not have an advance directive for healthcare treatment -- -- -- -- --            Admitting Physician:  Salomon Ulrich MD  PCP: Yonathan Conway DO    Discharging Nurse: Abdulkadir Julian [facility] Unit/Room#: [facility] Unit Phone Number: [phone]    Emergency Contact:   Extended Emergency Contact Information  Primary Emergency Contact: [address]boza  Phone: [phone]  Mobile Phone: [phone]  Relation: Other  Secondary Emergency Contact: ZionThien menjivar  Mobile Phone: [phone]  Relation: Brother/Sister   needed? No    Past Surgical History:  Past Surgical History:   Procedure Laterality Date    CERVICAL FUSION N/A 2022    ANTERIOR C3-C4, C4-C5,  AND C5-C6 CERVICAL DISCECTOMY AND FUSION performed by Salomon Ulrich MD at [address] [place] [address] N/A 2022    POSTERIOR C3-C7 LAMINECTOMY, POSTERIOR C3-T1 FUSION performed by Salomon Ulrich MD at [address] [place] [address] N/A 2022    PHARYNGEAL TEAR REPAIR performed by Salomon Ulrich MD at [address]       Immunization History: There is no immunization history on file for this patient.     Active Problems:  Patient Active Problem List   Diagnosis Code    Cervical spinal stenosis M48.02    Cervical stenosis of spinal canal M48.02    Cervical myelopathy (HCC) G95.9    Cervical stenosis of spine M48.02    Pre-operative laboratory examination Z01.812    Severe protein-calorie malnutrition ([facility] [address] E43       Isolation/Infection:   Isolation            No Isolation          Patient Infection Status       None to display            Nurse Assessment:  Last Vital Signs: BP (!) 155/110   Pulse 80   Temp 97.3 °F (36.3 °C) (Temporal)   Resp 14   Ht 5' 9\" (1.753 m)   Wt 128 lb (58.1 kg)   SpO2 94%   BMI 18.90 kg/m²     Last documented pain score (0-10 scale): Pain Level: 0  Last Weight:   Wt Readings from Last 1 Encounters:   07/18/22 128 lb (58.1 kg)     Mental Status:  oriented, alert, and thought processes intact    IV Access:  - None    Nursing Mobility/ADLs:  Walking   Assisted  Transfer  Assisted  Bathing  Assisted  Dressing  Assisted  Toileting  Assisted  Feeding  Dependent  Med Admin  Dependent  Med Delivery    PEG tube     Wound Care Documentation and Therapy:  Incision 07/18/22 Neck Anterior;Right (Active)   Dressing Status Clean;Dry; Intact 07/21/22 2000   Dressing/Treatment Dry dressing 07/21/22 2000   Closure Sutures 07/21/22 2000   Drainage Amount None 07/21/22 2000   Number of days: 4       Incision 07/18/22 Neck Posterior (Active)   Dressing Status Dry; Intact; Old drainage noted; Reinforced dressing 07/21/22 2000   Dressing/Treatment Dry dressing 07/21/22 2000   Closure Hollis; Sutures 07/21/22 2000   Drainage Amount Large 07/21/22 2000   Drainage Description Serosanguinous 07/21/22 2000   Number of days: 4       Incision 07/22/22 Abdomen Medial (Active)   Dressing Status Clean;Dry; Intact 07/22/22 1031   Dressing/Treatment Skin glue 07/22/22 1031   Closure Surgical glue 07/22/22 1031   Margins Approximated 07/22/22 1031   Drainage Amount Scant 07/22/22 1058   Drainage Description Sanguinous 07/22/22 1058   Number of days: 0        Elimination:  Continence:    Bowel: Yes  Bladder: Yes  Urinary Catheter: None   Colostomy/Ileostomy/Ileal Conduit: No       Date of Last BM: 7/25/2022- keep up on bowel routine    Intake/Output Summary (Last 24 hours) at 7/22/2022 1510  Last data filed at 7/22/2022 1051  Gross per 24 hour   Intake 600 ml   Output 1245 ml   Net -645 ml     I/O last 3 completed shifts:  In: -   Out: 2795 [Urine:2725; Drains:70]    Safety Concerns: At Risk for Falls and Aspiration Risk    Impairments/Disabilities:      Inability to swallow    Nutrition Therapy:  Current Nutrition Therapy:   - Tube Feedings:  Standard with fiber  - Parenteral Nutrition:  60 ml over 1 hour for 24 hrs per day (see PN orders for content)    Routes of Feeding: Gastrostomy Tube  Liquids: No Liquids  Daily Fluid Restriction: no- NPO   Last Modified Barium Swallow with Video (Video Swallowing Test): done on 07/21/2022/     Treatments at the Time of Hospital Discharge:   Respiratory Treatments: duoneb q4 while awake   Oxygen Therapy:  is not on home oxygen therapy.   Ventilator:    - No ventilator support    Rehab Therapies: Physical Therapy, Occupational Therapy, Orthotics/Prosthetics, and Speech/Language Therapy  Weight Bearing Status/Restrictions: No weight bearing restrictions  Other Medical Equipment (for information only, NOT a DME order):  walker, bath bench, bedside commode, hospital bed, and braces Custom Collar  Other Treatments: ***    Patient's personal belongings (please select all that are sent with patient):  None    RN SIGNATURE:  Electronically signed by Jackie Daniels RN on 7/25/22 at 10:20 AM EDT    CASE MANAGEMENT/SOCIAL WORK SECTION    Inpatient Status Date: ***    Readmission Risk Assessment Score:  Readmission Risk              Risk of Unplanned Readmission:  [id number]           Discharging to Facility/ Agency   Name:  [facility]  Address:  Phone:  Fax:    Dialysis Facility (if applicable)   Name:  Address:  Dialysis Schedule:  Phone:  Fax:    / signature: Electronically signed by Nicolas Osman RN  on 7/22/2022 at 3:10 PM      PHYSICIAN SECTION    Prognosis: Good    Condition at Discharge: Stable    Rehab Potential (if transferring to Rehab): Good    Recommended Labs or Other Treatments After Discharge: ***    Physician Certification: I certify the above information and transfer of [place]  is necessary for the continuing treatment of the diagnosis listed and that he requires {Admit to Appropriate Level of Care:38961} for less 30 days.      Update Admission H&P: No change in H&P    PHYSICIAN SIGNATURE:  {Esignature:[id number]}

## 2022-07-22 NOTE — PROGRESS NOTES
Hospitalist Progress Note      SYNOPSIS: Patient admitted on 2022 for back pain. Mr. Estrellita Voss, a 75y.o. 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 Possible pharyngeal/esophageal tear and patient was started on Unasyn. SUBJECTIVE:    Patient seen and examined in his room. Denies any chest pain, nausea, vomiting. Patient going for PEG tube placement on . Records reviewed. Stable overnight. No other overnight issues reported. Temp (24hrs), Av.2 °F (36.8 °C), Min:97.8 °F (36.6 °C), Max:98.6 °F (37 °C)    DIET: Diet NPO Exceptions are: Sips of Water with Meds  CODE: Prior    Intake/Output Summary (Last 24 hours) at 2022 0809  Last data filed at 2022 2141  Gross per 24 hour   Intake --   Output 1245 ml   Net -1245 ml       OBJECTIVE:    BP (!) 148/93   Pulse 82   Temp 98.2 °F (36.8 °C) (Temporal)   Resp 16   Ht 5' 9\" (1.753 m)   Wt 128 lb (58.1 kg)   SpO2 95%   BMI 18.90 kg/m²     General appearance: No apparent distress, appears stated age and cooperative. HEENT:  Conjunctivae/corneas clear. Neck: Supple. No jugular venous distention. Respiratory: Clear to auscultation bilaterally, normal respiratory effort  Cardiovascular: Regular rate rhythm, normal S1-S2  Abdomen: Soft, nontender, nondistended  Musculoskeletal: No clubbing, cyanosis, no bilateral lower extremity edema. Brisk capillary refill.    Skin:  No rashes  on visible skin  Neurologic: awake, alert and following commands     ASSESSMENT & PLAN:    ASSESSMENT:  ANTERIOR C3-C4, C4-C5,  AND C5-C6 CERVICAL DISCECTOMY AND FUSION   POSTERIOR C3-C7 LAMINECTOMY, POSTERIOR C3-T1 FUSION   PHARYNGEAL TEAR REPAIR   Appears medically stable  Hfx of prost ca  Hx of htn well conrolled  on lsinop and amlodip at home  Pharyngeal/esophageal tear     PLAN:  Once able to tolerate oral safely   Resume amlodipine and lisinopril  Continue Unasyn as per ID, started on 7/18  Patient seen by ENT on 7/21 due to hoarseness of his voice, dysphagia and secretions and patient underwent indirect endoscopy, showed secretions pooling but no obvious tear or abnormality  General surgery consulted for the placement of PEG tube  Replaced potassium with KCl 20 mg IV x1  Bilirubin slightly elevated to 2.1, repeat CMP on the morning of 7/23    DISPOSITION:   Unclear discharge disposition at the moment    Medications:  REVIEWED DAILY    Infusion Medications    sodium chloride 100 mL/hr at 07/22/22 0044    dextrose      sodium chloride      sodium chloride       Scheduled Medications    sodium chloride flush  5-40 mL IntraVENous 2 times per day    famotidine  20 mg Oral BID    Or    famotidine (PEPCID) injection  20 mg IntraVENous BID    ampicillin-sulbactam  3,000 mg IntraVENous Q6H     PRN Meds: glucose, dextrose bolus **OR** dextrose bolus, glucagon (rDNA), dextrose, sodium chloride, sodium chloride flush, sodium chloride, ondansetron **OR** ondansetron, morphine **OR** morphine, diphenhydrAMINE **OR** diphenhydrAMINE, bisacodyl, fleet    Labs:     Recent Labs     07/20/22  0617 07/21/22  0716 07/22/22  0541   WBC 12.7* 12.7* 8.2   HGB 10.4* 11.4* 11.3*   HCT 31.2* 33.9* 33.1*    218 249       Recent Labs     07/20/22  0617 07/21/22  0716 07/22/22  0541    140 140   K 3.8 3.6 3.2*   * 105 102   CO2 25 23 23   BUN 15 12 14   CREATININE 0.8 0.6* 0.6*   CALCIUM 8.1* 8.4* 8.4*       Recent Labs     07/20/22  0617 07/21/22  0716   PROT 5.8* 6.7   ALKPHOS 53 56   ALT 20 23   AST 41* 34   BILITOT 1.7* 2.2*       No results for input(s): INR in the last 72 hours. No results for input(s): Senait Edin in the last 72 hours.     Chronic labs:    Lab Results   Component Value Date    INR 1.1 07/13/2022       Radiology: REVIEWED DAILY    +++++++++++++++++++++++++++++++++++++++++++++++++  Skye Boateng MD  [address]  +++++++++++++++++++++++++++++++++++++++++++++++++  NOTE: This report was transcribed using voice recognition software. Every effort was made to ensure accuracy; however, inadvertent computerized transcription errors may be present.

## 2022-07-23 LAB
ALBUMIN SERPL-MCNC: 3.2 G/DL (ref 3.5–5.2)
ALP BLD-CCNC: 52 U/L (ref 40–129)
ALT SERPL-CCNC: 37 U/L (ref 0–40)
ANION GAP SERPL CALCULATED.3IONS-SCNC: 13 MMOL/L (ref 7–16)
AST SERPL-CCNC: 27 U/L (ref 0–39)
BILIRUB SERPL-MCNC: 1.1 MG/DL (ref 0–1.2)
BUN BLDV-MCNC: 24 MG/DL (ref 6–23)
CALCIUM SERPL-MCNC: 8.6 MG/DL (ref 8.6–10.2)
CHLORIDE BLD-SCNC: 108 MMOL/L (ref 98–107)
CHP ED QC CHECK: NORMAL
CO2: 21 MMOL/L (ref 22–29)
CREAT SERPL-MCNC: 0.8 MG/DL (ref 0.7–1.2)
GFR AFRICAN AMERICAN: >60
GFR NON-AFRICAN AMERICAN: >60 ML/MIN/1.73
GLUCOSE BLD-MCNC: 103 MG/DL
GLUCOSE BLD-MCNC: 104 MG/DL (ref 74–99)
HCT VFR BLD CALC: 33.2 % (ref 37–54)
HEMOGLOBIN: 11.1 G/DL (ref 12.5–16.5)
MCH RBC QN AUTO: 27.5 PG (ref 26–35)
MCHC RBC AUTO-ENTMCNC: 33.4 % (ref 32–34.5)
MCV RBC AUTO: 82.4 FL (ref 80–99.9)
METER GLUCOSE: 103 MG/DL (ref 74–99)
METER GLUCOSE: 112 MG/DL (ref 74–99)
METER GLUCOSE: 140 MG/DL (ref 74–99)
PDW BLD-RTO: 15.4 FL (ref 11.5–15)
PLATELET # BLD: 289 E9/L (ref 130–450)
PMV BLD AUTO: 9.8 FL (ref 7–12)
POTASSIUM SERPL-SCNC: 4 MMOL/L (ref 3.5–5)
RBC # BLD: 4.03 E12/L (ref 3.8–5.8)
SODIUM BLD-SCNC: 142 MMOL/L (ref 132–146)
TOTAL PROTEIN: 6.2 G/DL (ref 6.4–8.3)
WBC # BLD: 7.7 E9/L (ref 4.5–11.5)

## 2022-07-23 PROCEDURE — 94640 AIRWAY INHALATION TREATMENT: CPT

## 2022-07-23 PROCEDURE — 2500000003 HC RX 250 WO HCPCS: Performed by: SURGERY

## 2022-07-23 PROCEDURE — 2580000003 HC RX 258: Performed by: SURGERY

## 2022-07-23 PROCEDURE — 1200000000 HC SEMI PRIVATE

## 2022-07-23 PROCEDURE — 36415 COLL VENOUS BLD VENIPUNCTURE: CPT

## 2022-07-23 PROCEDURE — 6370000000 HC RX 637 (ALT 250 FOR IP): Performed by: NURSE PRACTITIONER

## 2022-07-23 PROCEDURE — 6360000002 HC RX W HCPCS: Performed by: SURGERY

## 2022-07-23 PROCEDURE — 80053 COMPREHEN METABOLIC PANEL: CPT

## 2022-07-23 PROCEDURE — 99024 POSTOP FOLLOW-UP VISIT: CPT | Performed by: NEUROLOGICAL SURGERY

## 2022-07-23 PROCEDURE — 97530 THERAPEUTIC ACTIVITIES: CPT

## 2022-07-23 PROCEDURE — A4216 STERILE WATER/SALINE, 10 ML: HCPCS | Performed by: SURGERY

## 2022-07-23 PROCEDURE — 85027 COMPLETE CBC AUTOMATED: CPT

## 2022-07-23 PROCEDURE — 6360000002 HC RX W HCPCS: Performed by: NEUROLOGICAL SURGERY

## 2022-07-23 PROCEDURE — 82962 GLUCOSE BLOOD TEST: CPT

## 2022-07-23 RX ADMIN — MORPHINE SULFATE 4 MG: 4 INJECTION, SOLUTION INTRAMUSCULAR; INTRAVENOUS at 17:48

## 2022-07-23 RX ADMIN — IPRATROPIUM BROMIDE AND ALBUTEROL SULFATE 1 AMPULE: .5; 2.5 SOLUTION RESPIRATORY (INHALATION) at 21:57

## 2022-07-23 RX ADMIN — MORPHINE SULFATE 4 MG: 4 INJECTION, SOLUTION INTRAMUSCULAR; INTRAVENOUS at 20:31

## 2022-07-23 RX ADMIN — MORPHINE SULFATE 4 MG: 4 INJECTION, SOLUTION INTRAMUSCULAR; INTRAVENOUS at 04:46

## 2022-07-23 RX ADMIN — FAMOTIDINE 20 MG: 10 INJECTION, SOLUTION INTRAVENOUS at 09:25

## 2022-07-23 RX ADMIN — AMPICILLIN SODIUM AND SULBACTAM SODIUM 3000 MG: 2; 1 INJECTION, POWDER, FOR SOLUTION INTRAMUSCULAR; INTRAVENOUS at 20:38

## 2022-07-23 RX ADMIN — MORPHINE SULFATE 4 MG: 4 INJECTION, SOLUTION INTRAMUSCULAR; INTRAVENOUS at 00:14

## 2022-07-23 RX ADMIN — FAMOTIDINE 20 MG: 10 INJECTION, SOLUTION INTRAVENOUS at 20:31

## 2022-07-23 RX ADMIN — IPRATROPIUM BROMIDE AND ALBUTEROL SULFATE 1 AMPULE: .5; 2.5 SOLUTION RESPIRATORY (INHALATION) at 07:45

## 2022-07-23 RX ADMIN — IPRATROPIUM BROMIDE AND ALBUTEROL SULFATE 1 AMPULE: .5; 2.5 SOLUTION RESPIRATORY (INHALATION) at 16:52

## 2022-07-23 RX ADMIN — ENOXAPARIN SODIUM 40 MG: 100 INJECTION SUBCUTANEOUS at 09:25

## 2022-07-23 RX ADMIN — AMPICILLIN SODIUM AND SULBACTAM SODIUM 3000 MG: 2; 1 INJECTION, POWDER, FOR SOLUTION INTRAMUSCULAR; INTRAVENOUS at 09:32

## 2022-07-23 RX ADMIN — IPRATROPIUM BROMIDE AND ALBUTEROL SULFATE 1 AMPULE: .5; 2.5 SOLUTION RESPIRATORY (INHALATION) at 11:45

## 2022-07-23 RX ADMIN — Medication 10 ML: at 20:39

## 2022-07-23 RX ADMIN — AMPICILLIN SODIUM AND SULBACTAM SODIUM 3000 MG: 2; 1 INJECTION, POWDER, FOR SOLUTION INTRAMUSCULAR; INTRAVENOUS at 03:37

## 2022-07-23 RX ADMIN — MORPHINE SULFATE 4 MG: 4 INJECTION, SOLUTION INTRAMUSCULAR; INTRAVENOUS at 09:26

## 2022-07-23 RX ADMIN — AMPICILLIN SODIUM AND SULBACTAM SODIUM 3000 MG: 2; 1 INJECTION, POWDER, FOR SOLUTION INTRAMUSCULAR; INTRAVENOUS at 16:40

## 2022-07-23 ASSESSMENT — PAIN DESCRIPTION - ORIENTATION
ORIENTATION: ANTERIOR
ORIENTATION: LOWER

## 2022-07-23 ASSESSMENT — PAIN DESCRIPTION - LOCATION
LOCATION: THROAT;ABDOMEN
LOCATION: NECK;ABDOMEN
LOCATION: ABDOMEN;THROAT
LOCATION: BACK
LOCATION: ABDOMEN;THROAT

## 2022-07-23 ASSESSMENT — PAIN SCALES - GENERAL
PAINLEVEL_OUTOF10: 6
PAINLEVEL_OUTOF10: 10
PAINLEVEL_OUTOF10: 8
PAINLEVEL_OUTOF10: 7
PAINLEVEL_OUTOF10: 9
PAINLEVEL_OUTOF10: 8
PAINLEVEL_OUTOF10: 5
PAINLEVEL_OUTOF10: 7
PAINLEVEL_OUTOF10: 4

## 2022-07-23 ASSESSMENT — PAIN - FUNCTIONAL ASSESSMENT
PAIN_FUNCTIONAL_ASSESSMENT: ACTIVITIES ARE NOT PREVENTED
PAIN_FUNCTIONAL_ASSESSMENT: ACTIVITIES ARE NOT PREVENTED

## 2022-07-23 ASSESSMENT — PAIN DESCRIPTION - DESCRIPTORS
DESCRIPTORS: ACHING;SORE
DESCRIPTORS: ACHING;DISCOMFORT;DULL
DESCRIPTORS: ACHING
DESCRIPTORS: ACHING;DISCOMFORT;DULL
DESCRIPTORS: ACHING;SORE

## 2022-07-23 ASSESSMENT — PAIN SCALES - WONG BAKER
WONGBAKER_NUMERICALRESPONSE: 0
WONGBAKER_NUMERICALRESPONSE: 0

## 2022-07-23 NOTE — PROGRESS NOTES
Physical Therapy  Physical Therapy Treatment     Name: Tea Cárdenas  : 1955  MRN: [id number]      Date of Service: 2022    Evaluating PT:  Kyaw Escobar PT, DPT [id number]    Room #:  3832/9597-M  Diagnosis:  Herniated disc, cervical [M50.20]  Cervical stenosis of spine [M48.02]  Cervical spinal stenosis [M48.02]  Cervical stenosis of spinal canal [M48.02]  Cervical myelopathy ([address] [G95.9]  PMHx/PSHx:  Prostate CA  Procedure/Surgery:  ANTERIOR C3-C4, C4-C5,  AND C5-C6 CERVICAL DISCECTOMY AND FUSION; POSTERIOR C3-C7 LAMINECTOMY, POSTERIOR C3-T1 FUSION; PHARYNGEAL TEAR REPAIR (2022); LAPAROSCOPIC 22F GASTROSTOMY TUBE PLACEMENT (2022)  Precautions:  Falls, spinal precautions, cervical collar, hemovac, , , TSM  Equipment Needs:  FWW  Equipment Owned:  SPC, wheelchair    SUBJECTIVE:    Pt lives with god-sister in a 3 story home with 4 stair(s) to enter and 0 rail(s). Bed is on 1st floor and bath is on 2nd floor. There is a full flight of stairs with 1 rail to 2nd floor. Pt ambulated short distances with SPC PTA; used wheelchair for long distance mobility. OBJECTIVE:   Initial Evaluation  Date: 2022 Treatment  2022 Short Term/ Long Term   Goals   AM-PAC 6 Clicks 73/55 /16    Was pt agreeable to Eval/treatment? Yes Yes    Does pt have pain? 7/10 head, neck Neck pain     Bed Mobility  Rolling: Bob  Supine to sit: ModA  Sit to supine: ModA  Scooting: ModA (seated) Rolling: SBA  Supine to sit: Bob   Sit to supine: NT  Scooting: NT Rolling: Independent  Supine to sit:  Independent  Sit to supine: Independent  Scooting: Independent   Transfers Sit to stand: ModA  Stand to sit: ModA  Stand pivot: NT Sit to stand: min A   Stand to sit: min A  Stand pivot: min A with [employer] Sit to stand: Heather  Stand to sit: Heather  Stand pivot: Heather with AAD   Ambulation    5 feet with [employer] ModA 5 feet with ww with min A 75 feet with AAD Heather   Stair negotiation: ascended and descended  NT NT 12 step(s) with 1 rail(s) Heather   Wheelchair mobility NT  feet Independent   ROM BUE:  See OT note  BLE:  WFL     Strength BUE:  See OT note  RLE:  Grossly 5/5  LLE:  Grossly 3+/5     Balance Sitting EOB:  Bob  Dynamic Standing:  ModA with [employer]  Sitting EOB:  Independent  Dynamic Standing:  Heather with AAD         Patient education  Pt educated on hand placement with transfers     Patient response to education:   Pt verbalized understanding Pt demonstrated skill Pt requires further education in this area   x Partially with verbal cues  x     ASSESSMENT:    Comments:  Nursing cleared pt for physical therapy. Pt in bed upon arrival and agreed to participate in therapy . Anxiety noted throughout treatment  Pt completed functional mobility as noted above. Assistance required with trunk management with bed mobility. Pt sat on edge of bed with SBA for balance. Pt stood at walker and required min A while pt used urinal. Decreased safety awareness noted with short distance from bed to recliner chair. Pt remained in recliner chair with call light in reach. Treatment:  Patient practiced and was instructed in the following treatment:    Bed mobility - Verbal instruction for technique to maintain spinal/cervical precautions. Assistance required to complete task. Static sitting - performed to promote upright tolerance and balance maintenance  Functional transfers - verbal instruction for hand placement and technique to improve safety and balance. Assistance required to complete task. Ambulation - Verbal instruction for upright  posture and walker management. Assistance required to complete task. PLAN:    Patient is making good progress towards established goals. Will continue with current POC.       Time in  0830  Time out 0855    Total Treatment Time  25minutes     CPT codes:  [] Gait training 84961 0 minutes  [] Manual therapy 35603 0 minutes  [x] Therapeutic activities 16975 25 minutes  [] Therapeutic exercises 34101 0

## 2022-07-23 NOTE — PROGRESS NOTES
GENERAL SURGERY  DAILY PROGRESS NOTE  7/23/2022      Cc: s/p Ant/Post cervical fusion, postop dysphagia    Subjective:  Pain controlled. S/p Lap Gtube yesterday. Tolerating tube feeds, currently running @ 20ml/hr    Objective:  BP (!) 140/88   Pulse 82   Temp 98 °F (36.7 °C) (Temporal)   Resp 17   Ht 5' 9\" (1.753 m)   Wt 125 lb 14.4 oz (57.1 kg)   SpO2 98%   BMI 18.59 kg/m²     GENERAL:  Laying in bed, awake, alert, cooperative, no apparent distress  HEAD: Normocephalic, atraumatic  EYES: No sclera icterus, pupils equal  LUNGS:  No increased work of breathing  CARDIOVASCULAR:  regular rate  ABDOMEN:  Soft, minimal tenderness at surgical incisions. non-distended. LUQ g-tube in place with tube feeds running. Dermabond intact over incisions. EXTREMITIES: No edema or swelling  SKIN: Warm and dry, no rashes or lesions    Assessment/Plan:  75 y.o. male with dysphagia s/p lap gastrostomy tube    Advance tube feeds to goal as tolerated  Routine G-tube care  Follow up outpatient with Dr. Nati Hermosillo in 2 weeks for postop visit.     Electronically signed by Lowell Luna DO on 7/23/2022 at 5:46 AM

## 2022-07-23 NOTE — PLAN OF CARE
Problem: Discharge Planning  Goal: Discharge to home or other facility with appropriate resources  Outcome: Not Progressing     Problem: Nutrition Deficit:  Goal: Optimize nutritional status  Outcome: Not Progressing     Problem: Discharge Planning  Goal: Discharge to home or other facility with appropriate resources  Outcome: Not Progressing     Problem: Nutrition Deficit:  Goal: Optimize nutritional status  Outcome: Not Progressing

## 2022-07-23 NOTE — PROGRESS NOTES
[address] [place] [address] [facility]  [facility]  Progress Note    Chief complaint: Numbness in the upper extremities and difficulty ambulating      SUBJECTIVE:  Patient is tolerating medications. No reported adverse drug reactions. No nausea, vomiting, diarrhea. Afebrile alert  Had peg placed today  Instructed on IS, he was unable to take deep enough breath to move the ball. He did bring up secretions  Review of systems:  As stated above in the chief complaint, otherwise negative. Medications:  Scheduled Meds:   ipratropium-albuterol  1 ampule Inhalation Q4H WA    enoxaparin  40 mg SubCUTAneous Daily    sodium chloride flush  5-40 mL IntraVENous 2 times per day    famotidine  20 mg Oral BID    Or    famotidine (PEPCID) injection  20 mg IntraVENous BID    ampicillin-sulbactam  3,000 mg IntraVENous Q6H     Continuous Infusions:   dextrose      sodium chloride      sodium chloride       PRN Meds:hydrALAZINE, glucose, dextrose bolus **OR** dextrose bolus, glucagon (rDNA), dextrose, sodium chloride, sodium chloride flush, sodium chloride, ondansetron **OR** ondansetron, morphine **OR** morphine, diphenhydrAMINE **OR** diphenhydrAMINE, bisacodyl, fleet    OBJECTIVE:  BP (!) 149/96   Pulse 85   Temp 97.7 °F (36.5 °C) (Temporal)   Resp 16   Ht 5' 9\" (1.753 m)   Wt 125 lb 14.4 oz (57.1 kg)   SpO2 95%   BMI 18.59 kg/m²   Temp  Av.2 °F (36.8 °C)  Min: 97.7 °F (36.5 °C)  Max: 98.8 °F (37.1 °C)  Constitutional: The patient is awake, alert, and oriented. Skin: Warm and dry. No rashes were noted. HEENT: Round and reactive pupils. Moist mucous membranes. No ulcerations or thrush. Neck: Supple, no LN ,  Chest: Bilateral rhonchi   Cardiovascular: S1 and S2 are rhythmic and regular. No murmurs appreciated. Abdomen: Positive bowel sounds to auscultation. Benign to palpation. No masses felt. No hepatosplenomegaly.  Peg- functioning   Genitourinary: Male  Extremities: No clubbing, no cyanosis, no edema.   Lines: peripheral    Laboratory and Tests Review:  Lab Results   Component Value Date    WBC 7.7 07/23/2022    WBC 8.2 07/22/2022    WBC 12.7 (H) 07/21/2022    HGB 11.1 (L) 07/23/2022    HCT 33.2 (L) 07/23/2022    MCV 82.4 07/23/2022     07/23/2022     Lab Results   Component Value Date    NEUTROABS 10.74 (H) 07/21/2022    NEUTROABS 10.91 (H) 07/20/2022    NEUTROABS 2.44 07/13/2022     No results found for: CRPHS  Lab Results   Component Value Date    ALT 37 07/23/2022    AST 27 07/23/2022    ALKPHOS 52 07/23/2022    BILITOT 1.1 07/23/2022     Lab Results   Component Value Date/Time     07/23/2022 06:08 AM    K 4.0 07/23/2022 06:08 AM    K 4.0 07/13/2022 09:45 AM     07/23/2022 06:08 AM    CO2 21 07/23/2022 06:08 AM    BUN 24 07/23/2022 06:08 AM    CREATININE 0.8 07/23/2022 06:08 AM    CREATININE 0.6 07/22/2022 05:41 AM    CREATININE 0.6 07/21/2022 07:16 AM    GFRAA >60 07/23/2022 06:08 AM    LABGLOM >60 07/23/2022 06:08 AM    GLUCOSE 104 07/23/2022 06:08 AM    PROT 6.2 07/23/2022 06:08 AM    LABALBU 3.2 07/23/2022 06:08 AM    CALCIUM 8.6 07/23/2022 06:08 AM    BILITOT 1.1 07/23/2022 06:08 AM    ALKPHOS 52 07/23/2022 06:08 AM    AST 27 07/23/2022 06:08 AM    ALT 37 07/23/2022 06:08 AM     No results found for: CRP  No results found for: [address]  Radiology:      Microbiology:   No results found for: BC, ORG  No results found for: [place], [place]  No results found for: WNDABS  No results found for: RESPSMEAR  No results found for: Farida Homer, LABLEGI, AFBCX, FUNGSM, LABFUNG  No results found for: CULTRESP  No results found for: CXCATHTIP  No results found for: BFCS  No results found for: CXSURG  Urine Culture, Routine   Date Value Ref Range Status   07/18/2022 Growth not present  Final     MRSA Culture Only   Date Value Ref Range Status   07/13/2022 Methicillin resistant Staph aureus not isolated  Final       ASSESSMENT:  Status post cervical fusion with a pharyngeal/esophageal tear  Patient is a high risk for aspiration as noted also only by his physical exam and upper respiratory secretions and wheezing but the fact that he can even move the inspirometer marsanty    PLAN:  Continue Unasyn 3 grams IV q 6 hrs  day 5 /10 days  Monitor labs    Tia Newton MD  12:58 PM  7/23/2022

## 2022-07-23 NOTE — PROGRESS NOTES
Department of Neurosurgery  Progress Note    CHIEF COMPLAINT: s/p anterior/posterior cervical fusion    SUBJECTIVE:  s/p PEG    REVIEW OF SYSTEMS :  Constitutional: Negative for chills and fever. Neurological: Negative for dizziness, tremors and speech change.      OBJECTIVE:   VITALS:  BP (!) 149/96   Pulse 85   Temp 97.7 °F (36.5 °C) (Temporal)   Resp 16   Ht 5' 9\" (1.753 m)   Wt 125 lb 14.4 oz (57.1 kg)   SpO2 95%   BMI 18.59 kg/m²   PHYSICAL:  AAO x conversant  Speech clear  Face symmetric GI/FT  Tongue MDL  SS symm and strong  SOLIMAN-C  Collar in place      DATA:  CBC:   Lab Results   Component Value Date/Time    WBC 7.7 07/23/2022 06:08 AM    RBC 4.03 07/23/2022 06:08 AM    HGB 11.1 07/23/2022 06:08 AM    HCT 33.2 07/23/2022 06:08 AM    MCV 82.4 07/23/2022 06:08 AM    MCH 27.5 07/23/2022 06:08 AM    MCHC 33.4 07/23/2022 06:08 AM    RDW 15.4 07/23/2022 06:08 AM     07/23/2022 06:08 AM    MPV 9.8 07/23/2022 06:08 AM     BMP:    Lab Results   Component Value Date/Time     07/23/2022 06:08 AM    K 4.0 07/23/2022 06:08 AM    K 4.0 07/13/2022 09:45 AM     07/23/2022 06:08 AM    CO2 21 07/23/2022 06:08 AM    BUN 24 07/23/2022 06:08 AM    LABALBU 3.2 07/23/2022 06:08 AM    CREATININE 0.8 07/23/2022 06:08 AM    CALCIUM 8.6 07/23/2022 06:08 AM    GFRAA >60 07/23/2022 06:08 AM    LABGLOM >60 07/23/2022 06:08 AM    GLUCOSE 104 07/23/2022 06:08 AM     PT/INR:    Lab Results   Component Value Date/Time    PROTIME 11.7 07/13/2022 09:45 AM    INR 1.1 07/13/2022 09:45 AM     PTT:    Lab Results   Component Value Date/Time    APTT 30.0 09/09/2020 01:35 PM   [APTT}    Current Inpatient Medications  Current Facility-Administered Medications: hydrALAZINE (APRESOLINE) injection 10 mg, 10 mg, IntraVENous, Q6H PRN  ipratropium-albuterol (DUONEB) nebulizer solution 1 ampule, 1 ampule, Inhalation, Q4H WA  enoxaparin (LOVENOX) injection 40 mg, 40 mg, SubCUTAneous, Daily  glucose chewable tablet 16 g, 4 tablet, Oral, PRN  dextrose bolus 10% 125 mL, 125 mL, IntraVENous, PRN **OR** dextrose bolus 10% 250 mL, 250 mL, IntraVENous, PRN  glucagon (rDNA) injection 1 mg, 1 mg, SubCUTAneous, PRN  dextrose 10 % infusion, , IntraVENous, Continuous PRN  0.9 % sodium chloride infusion, , IntraVENous, PRN  sodium chloride flush 0.9 % injection 5-40 mL, 5-40 mL, IntraVENous, 2 times per day  sodium chloride flush 0.9 % injection 5-40 mL, 5-40 mL, IntraVENous, PRN  0.9 % sodium chloride infusion, , IntraVENous, PRN  ondansetron (ZOFRAN-ODT) disintegrating tablet 4 mg, 4 mg, Oral, Q8H PRN **OR** ondansetron (ZOFRAN) injection 4 mg, 4 mg, IntraVENous, Q6H PRN  morphine (PF) injection 2 mg, 2 mg, IntraVENous, Q2H PRN **OR** morphine sulfate (PF) injection 4 mg, 4 mg, IntraVENous, Q2H PRN  diphenhydrAMINE (BENADRYL) tablet 25 mg, 25 mg, Oral, Q6H PRN **OR** diphenhydrAMINE (BENADRYL) injection 25 mg, 25 mg, IntraVENous, Q6H PRN  bisacodyl (DULCOLAX) suppository 10 mg, 10 mg, Rectal, Daily PRN  fleet rectal enema 1 enema, 1 enema, Rectal, Daily PRN  famotidine (PEPCID) tablet 20 mg, 20 mg, Oral, BID **OR** famotidine (PEPCID) 20 mg in sodium chloride (PF) 10 mL injection, 20 mg, IntraVENous, BID  ampicillin-sulbactam (UNASYN) 3000 mg in 100 mL NS IVPB minibag, 3,000 mg, IntraVENous, Q6H    ASSESSMENT:   S/p C3-6 ACDF and C3-T1 PCF on 7/18 - stable  Pharyngeal perforation    PLAN:  PT/OT  WBAT with cervical collar  Pain control  NPO  ENT following  AB per ID  Speech following.  PEG per surg      Electronically signed by Sunshine Li MD on 7/23/2022 at 11:02 AM s/p anterior

## 2022-07-23 NOTE — PROGRESS NOTES
Hospitalist Progress Note      SYNOPSIS: Patient admitted on 2022 for back pain. Mr. Mee Gould, a 75y.o. 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 Possible pharyngeal/esophageal tear and patient was started on Unasyn on  with a plan to continue it for 10 days. Patient underwent PEG tube placement on  and tube feeds were started the same night. SUBJECTIVE:    Patient seen and examined in his room. Denies any chest pain, nausea, vomiting. Patient going for PEG tube placement on . Records reviewed. Stable overnight. No other overnight issues reported. Temp (24hrs), Av.9 °F (36.6 °C), Min:97.3 °F (36.3 °C), Max:98.8 °F (37.1 °C)    DIET: Diet NPO Exceptions are: Sips of Water with Meds  ADULT TUBE FEEDING; PEG; Standard with Fiber; Continuous; 10; Yes; 10; Q 6 hours; 60; 150; Q 4 hours  CODE: Prior    Intake/Output Summary (Last 24 hours) at 2022 0759  Last data filed at 2022 0108  Gross per 24 hour   Intake 750 ml   Output 300 ml   Net 450 ml       OBJECTIVE:    BP (!) 140/88   Pulse 82   Temp 98 °F (36.7 °C) (Temporal)   Resp 17   Ht 5' 9\" (1.753 m)   Wt 125 lb 14.4 oz (57.1 kg)   SpO2 92%   BMI 18.59 kg/m²     General appearance: No apparent distress, appears stated age and cooperative. HEENT:  Conjunctivae/corneas clear. Neck: Supple. No jugular venous distention. Respiratory: Clear to auscultation bilaterally, normal respiratory effort  Cardiovascular: Regular rate rhythm, normal S1-S2  Abdomen: Soft, nontender, nondistended  Musculoskeletal: No clubbing, cyanosis, no bilateral lower extremity edema. Brisk capillary refill.    Skin:  No rashes  on visible skin  Neurologic: awake, alert and following commands     ASSESSMENT & PLAN:    ASSESSMENT:  ANTERIOR C3-C4, C4-C5,  AND C5-C6 CERVICAL DISCECTOMY AND FUSION   POSTERIOR C3-C7 LAMINECTOMY, POSTERIOR C3-T1 FUSION   PHARYNGEAL TEAR REPAIR   Appears medically stable  Hfx of prost ca  Hx of htn well conrolled  on lsinop and amlodip at home  Pharyngeal/esophageal tear     PLAN:  Once able to tolerate oral safely   Resume amlodipine and lisinopril  Continue Unasyn as per ID, started on 7/18  Patient seen by ENT on 7/21 due to hoarseness of his voice, dysphagia and secretions and patient underwent indirect endoscopy, showed secretions pooling but no obvious tear or abnormality  General surgery consulted for the placement of PEG tube  Replaced potassium with KCl 20 mg IV x1  Bilirubin slightly elevated to 2.1, repeat CMP on the morning of 7/23, resolved  Status post PEG tube placement on 7/22, already started on tube feeds the same day.     DISPOSITION:   Unclear discharge disposition at the moment    Medications:  REVIEWED DAILY    Infusion Medications    sodium chloride 50 mL/hr at 07/22/22 1223    dextrose      sodium chloride      sodium chloride       Scheduled Medications    ipratropium-albuterol  1 ampule Inhalation Q4H WA    enoxaparin  40 mg SubCUTAneous Daily    sodium chloride flush  5-40 mL IntraVENous 2 times per day    famotidine  20 mg Oral BID    Or    famotidine (PEPCID) injection  20 mg IntraVENous BID    ampicillin-sulbactam  3,000 mg IntraVENous Q6H     PRN Meds: hydrALAZINE, glucose, dextrose bolus **OR** dextrose bolus, glucagon (rDNA), dextrose, sodium chloride, sodium chloride flush, sodium chloride, ondansetron **OR** ondansetron, morphine **OR** morphine, diphenhydrAMINE **OR** diphenhydrAMINE, bisacodyl, fleet    Labs:     Recent Labs     07/21/22  0716 07/22/22  0541 07/23/22  0608   WBC 12.7* 8.2 7.7   HGB 11.4* 11.3* 11.1*   HCT 33.9* 33.1* 33.2*    249 289       Recent Labs     07/21/22  0716 07/22/22  0541 07/23/22  0608    140 142   K 3.6 3.2* 4.0    102 108*   CO2 23 23 21*   BUN 12 14 24*   CREATININE 0.6* 0.6* 0.8   CALCIUM 8.4* 8.4* 8.6       Recent Labs     07/21/22  0716 07/23/22  0608   PROT 6.7 6.2*   ALKPHOS 56 52   ALT 23 37   AST 34 27   BILITOT 2.2* 1.1       No results for input(s): INR in the last 72 hours. No results for input(s): Cherre Gash in the last 72 hours. Chronic labs:    Lab Results   Component Value Date    INR 1.1 07/13/2022       Radiology: REVIEWED DAILY    +++++++++++++++++++++++++++++++++++++++++++++++++  Sung Patton MD  [facility] - [address] [place] [address]  +++++++++++++++++++++++++++++++++++++++++++++++++  NOTE: This report was transcribed using voice recognition software. Every effort was made to ensure accuracy; however, inadvertent computerized transcription errors may be present.

## 2022-07-23 NOTE — DISCHARGE INSTRUCTIONS
Home Tube Feeding: Care Instructions  Overview  Tube feeding is a way of providing nutrition and fluids through a tube into the stomach or intestines. The tube may be inserted through the skin and into the stomach during surgery, or it may go through the mouth or nose, down the throat, and then into the stomach. Tube feeding can nourish people who have a short illness that makes swallowing difficult or people who have a severeillness, and it may prolong life. Follow-up care is a key part of your treatment and safety. Be sure to make and go to all appointments, and call your doctor if you are having problems. It's also a good idea to know your test results and keep alist of the medicines you take. How can you care for yourself at home? Follow your doctor's instructions for use and care of the feeding tube. Your doctor will:  Tell you what tube feeding formula and fluids to put through the tube. Show you how to care for the skin around the tube. Be sure to follow instructions on keeping the area clean. Teach you how to watch for infection or blockage of the tube. Tell you what activities you can do. Keep the formula in the refrigerator after opening it. Follow your doctor's instructions about how long formula that's in the hanging bag can sit out at room temperature. For the caregiver  Wash your hands before handling the tube and formula. Wash the top of the can of formula before you open it. Tube feedings that go into the stomach: The person you are caring for needs to be sitting up or have their head up during the feeding and for 30 to 60 minutes afterward. These feedings can be given in about 30 minutes, five or six times throughout a day. Tube feedings that go into the intestine: The person you are caring for will have a pump that slowly pushes the formula into the intestine over several hours. This is often done at night.   If nausea, diarrhea, or stomach cramps happen during feeding, slow the rate that the formula comes through the tube. Then gradually increase the amount as the person can tolerate it. Flush the tube with plain water after each feeding to keep it clean. Do not put anything other than formula or water through the tube unless your doctor has told you to. Take care of yourself. Do not try to do everything yourself. Ask other family members to help, and find out what other types of help may be available. Eat well and get enough rest. Make sure you do not ignore your own health while you are caring for your loved one. Schedule time for yourself. Get out of the house to do things you enjoy, run errands, or go shopping. When should you call for help? Call your doctor now or seek immediate medical care if:    You have signs of infection, such as: Increased pain, swelling, warmth, or redness around the tube. Red streaks leading from the area where the tube is inserted. Pus draining from the tube area. A fever. The tube comes out or becomes blocked. You have nausea, vomiting, or diarrhea. Watch closely for changes in your health, and be sure to contact your doctor if:    You have any problems with your feeding. Where can you learn more? Go to https://[facility].Accessbio. org and sign in to your Crono account. Enter U373 in the [employer]Sync box to learn more about \"Home Tube Feeding: Care Instructions. \"     If you do not have an account, please click on the \"Sign Up Now\" link. Current as of: September 8, 2021               Content Version: 13.3  © 2006-2022 Healthwise, Incorporated. Care instructions adapted under license by [facility] ([facility]). If you have questions about a medical condition or this instruction, always ask your healthcare professional. [place] [address] any warranty or liability for your use of this information. Learning About Living With a Feeding Tube  What is tube feeding?   Your body needs nutrition to stay strong and help you live a healthy life. If you're unable to eat, or if you have an illness that makes it hard to swallow food, you may need a feeding tube. The tube is placed in the stomach and isused to give food, liquids, and medicines. Depending on why you need a feeding tube, you may have it for several weeks ormonths or longer. When you first get a feeding tube, your biggest challenge may be your new relationship with food. For many people, eating and savoring food is one of the most pleasing parts of daily life. You may grieve the loss of the daily habitof eating and the social aspects of sharing food with others. If you've struggled to get enough nutrition--if it's been hard to eat or swallow--having a feeding tube can help you regain your health and strength. And understanding how a feeding tube works is a first step toward dealing with changes that come with having the tube. It can also help you avoid commonproblems that can occur. What can you expect when you have a feeding tube? After surgery to insert a feeding tube, you'll have a 6- to 12-inch tube comingout of your belly. The tube is about the same width as a pen. There are different ways the tube can be used for feeding. Your doctor willhelp you decide which is best for you and how often feedings should occur. A feeding syringe. A syringe is connected to the tube. A nutritional mixture (formula) is put into the syringe and flows into the tube and your stomach. This is called bolusfeeding. A gravity bag. Formula is placed into a special bag that is hung on a hook or a pole. The height and weight of the bag make the food flow down the tube and into yourstomach. A bag and pump. A pump is used to push formula from a bag through the tube. This is also calledcontinuous feeding. How do you use a feeding tube? It's important that the food you use for tube feeding has the right blend of nutrients for you.  And the food needs to be the correct thickness so the tube doesn't clog. For most people, a liquid formula that you can buy in a can works best for tube feeding. Your doctor or dietitian will help you find the rightformula to use. Each time you use the tube for feeding:  Make sure that the tube-feeding formula is at room temperature. Wash your hands before you handle the tube and formula. Wash the top of the can of formula before you open it. Follow your doctor's instructions for how much formula to use for each feeding. If using a feeding syringe: Connect the syringe to the tube, and put the formula into the syringe. Hold the syringe up high so the formula flows into the tube. Use the plunger on the syringe to gently push any remaining formula into the tube. If using a gravity bag: Connect the bag to the tube, and add the formula to the bag. Hang the bag on a hook or pole about 18 inches above the stomach. Depending on the type of formula, the food may take a few hours to flow through the tube. Ask your doctor what you can expect and how long it should take. If using a bag and pump, follow the instructions that come with the pump. Flush the tube with warm water before and after feedings or before and after giving medicines through the tube. You can use a syringe to push water through the tube. Sit up or keep your head up during the feeding and for 30 to 60 minutes after. If you feel sick to your stomach or have stomach cramps during the feeding, slow the rate that the formula comes through the tube. Then slowly increase the rate as you can manage it. Keep the formula in the refrigerator after you open it. Follow your doctor's instructions about how long formula can sit out at room temperature. Throw away any open cans of food after 24 hours, even if they have been refrigerated. Talk with your doctor about changing your feedings or medicines if you are having problems with diarrhea, constipation, or vomiting. How do you care for a feeding tube?   Keep it clean. That's the most important thing you need to know about caring for your tube. Flush the tube with warm water before and after feedings or giving medicines. You can use a syringe to push water through the tube. Clean the end (opening) of the tube every day with an antiseptic wipe. Always wash your hands before touching the tube. Tape the tube to your body so the end is facing up. Look for medical tape in your local drugstore. It may irritate your skin less than other types of tape. Change the position of the tape every few days. Clamp the tube when you're not using it. Put the clamp close to your body so that food and liquids don't run down the tube. Keep the skin around the tube clean and dry. How do you avoid problems with a feeding tube? Blocked tube. A blocked tube can happen when the tube isn't flushed or when formula ormedicines are too thick. Prevent blockage by flushing the tube with warm water before and after using the tube. If the tube is blocked, try to clear it by flushing the tube. Call your doctor if the tube won't clear. Don't use a wire or anything else to try to unclog a tube. A wire can poke a hole in the tube. Tube falls out. Don't try to put the tube back in by yourself. Call your doctor right away. The tube needs to be replaced before the opening in your belly closes, which canhappen within hours. Leaking tube. A tube that leaks may be blocked, or it may not fit right. After checking thetube and flushing it to make sure that the tube isn't blocked, call your doctor. Where can you learn more? Go to https://[facility].PoshVine. org and sign in to your Kraken account. Enter F416 in the [employer] box to learn more about \"Learning About Living With a Feeding Tube. \"     If you do not have an account, please click on the \"Sign Up Now\" link. Current as of: September 8, 2021               Content Version: 13.3  © 5843-9530 Healthwise, [facility].    Care instructions adapted under license by [facility] ([facility]). If you have questions about a medical condition or this instruction, always ask your healthcare professional. [place] [address] any warranty or liability for your use of this information. Discharge Instructions    1. No lifting more than 10 pounds. 2. C-spine collar to be worn at all times. 3. Follow up in clinic in 2 weeks for staple removal  4. Leave incision open to air. 5. Patient may shower, do not soak or scrub at the incision site. 6. Refrain from driving or sexual activity for 1 month. 7. Follow-up with Dr. Larry Vieyra in the office in 1 month with cervical x-rays.

## 2022-07-24 LAB
ANION GAP SERPL CALCULATED.3IONS-SCNC: 10 MMOL/L (ref 7–16)
BUN BLDV-MCNC: 15 MG/DL (ref 6–23)
CALCIUM SERPL-MCNC: 8.6 MG/DL (ref 8.6–10.2)
CHLORIDE BLD-SCNC: 107 MMOL/L (ref 98–107)
CO2: 24 MMOL/L (ref 22–29)
CREAT SERPL-MCNC: 0.7 MG/DL (ref 0.7–1.2)
GFR AFRICAN AMERICAN: >60
GFR NON-AFRICAN AMERICAN: >60 ML/MIN/1.73
GLUCOSE BLD-MCNC: 102 MG/DL (ref 74–99)
HCT VFR BLD CALC: 33.6 % (ref 37–54)
HEMOGLOBIN: 11 G/DL (ref 12.5–16.5)
MCH RBC QN AUTO: 27.8 PG (ref 26–35)
MCHC RBC AUTO-ENTMCNC: 32.7 % (ref 32–34.5)
MCV RBC AUTO: 85.1 FL (ref 80–99.9)
METER GLUCOSE: 142 MG/DL (ref 74–99)
METER GLUCOSE: 157 MG/DL (ref 74–99)
METER GLUCOSE: 176 MG/DL (ref 74–99)
PDW BLD-RTO: 15.7 FL (ref 11.5–15)
PLATELET # BLD: 280 E9/L (ref 130–450)
PMV BLD AUTO: 9.9 FL (ref 7–12)
POTASSIUM SERPL-SCNC: 3.9 MMOL/L (ref 3.5–5)
RBC # BLD: 3.95 E12/L (ref 3.8–5.8)
SODIUM BLD-SCNC: 141 MMOL/L (ref 132–146)
WBC # BLD: 6.6 E9/L (ref 4.5–11.5)

## 2022-07-24 PROCEDURE — 94640 AIRWAY INHALATION TREATMENT: CPT

## 2022-07-24 PROCEDURE — 80048 BASIC METABOLIC PNL TOTAL CA: CPT

## 2022-07-24 PROCEDURE — 99024 POSTOP FOLLOW-UP VISIT: CPT | Performed by: NEUROLOGICAL SURGERY

## 2022-07-24 PROCEDURE — 2500000003 HC RX 250 WO HCPCS: Performed by: SURGERY

## 2022-07-24 PROCEDURE — 36415 COLL VENOUS BLD VENIPUNCTURE: CPT

## 2022-07-24 PROCEDURE — 2580000003 HC RX 258: Performed by: SURGERY

## 2022-07-24 PROCEDURE — 6370000000 HC RX 637 (ALT 250 FOR IP): Performed by: NURSE PRACTITIONER

## 2022-07-24 PROCEDURE — 1200000000 HC SEMI PRIVATE

## 2022-07-24 PROCEDURE — 6360000002 HC RX W HCPCS: Performed by: SURGERY

## 2022-07-24 PROCEDURE — 82962 GLUCOSE BLOOD TEST: CPT

## 2022-07-24 PROCEDURE — 85027 COMPLETE CBC AUTOMATED: CPT

## 2022-07-24 PROCEDURE — 6360000002 HC RX W HCPCS: Performed by: NEUROLOGICAL SURGERY

## 2022-07-24 PROCEDURE — A4216 STERILE WATER/SALINE, 10 ML: HCPCS | Performed by: SURGERY

## 2022-07-24 RX ADMIN — AMPICILLIN SODIUM AND SULBACTAM SODIUM 3000 MG: 2; 1 INJECTION, POWDER, FOR SOLUTION INTRAMUSCULAR; INTRAVENOUS at 05:05

## 2022-07-24 RX ADMIN — AMPICILLIN SODIUM AND SULBACTAM SODIUM 3000 MG: 2; 1 INJECTION, POWDER, FOR SOLUTION INTRAMUSCULAR; INTRAVENOUS at 20:35

## 2022-07-24 RX ADMIN — MORPHINE SULFATE 4 MG: 4 INJECTION, SOLUTION INTRAMUSCULAR; INTRAVENOUS at 20:35

## 2022-07-24 RX ADMIN — IPRATROPIUM BROMIDE AND ALBUTEROL SULFATE 1 AMPULE: .5; 2.5 SOLUTION RESPIRATORY (INHALATION) at 07:43

## 2022-07-24 RX ADMIN — ENOXAPARIN SODIUM 40 MG: 100 INJECTION SUBCUTANEOUS at 10:41

## 2022-07-24 RX ADMIN — IPRATROPIUM BROMIDE AND ALBUTEROL SULFATE 1 AMPULE: .5; 2.5 SOLUTION RESPIRATORY (INHALATION) at 11:44

## 2022-07-24 RX ADMIN — Medication 10 ML: at 10:41

## 2022-07-24 RX ADMIN — AMPICILLIN SODIUM AND SULBACTAM SODIUM 3000 MG: 2; 1 INJECTION, POWDER, FOR SOLUTION INTRAMUSCULAR; INTRAVENOUS at 10:53

## 2022-07-24 RX ADMIN — MORPHINE SULFATE 4 MG: 4 INJECTION, SOLUTION INTRAMUSCULAR; INTRAVENOUS at 05:05

## 2022-07-24 RX ADMIN — IPRATROPIUM BROMIDE AND ALBUTEROL SULFATE 1 AMPULE: .5; 2.5 SOLUTION RESPIRATORY (INHALATION) at 20:52

## 2022-07-24 RX ADMIN — MORPHINE SULFATE 4 MG: 4 INJECTION, SOLUTION INTRAMUSCULAR; INTRAVENOUS at 10:41

## 2022-07-24 RX ADMIN — AMPICILLIN SODIUM AND SULBACTAM SODIUM 3000 MG: 2; 1 INJECTION, POWDER, FOR SOLUTION INTRAMUSCULAR; INTRAVENOUS at 16:18

## 2022-07-24 RX ADMIN — FAMOTIDINE 20 MG: 10 INJECTION, SOLUTION INTRAVENOUS at 20:34

## 2022-07-24 RX ADMIN — IPRATROPIUM BROMIDE AND ALBUTEROL SULFATE 1 AMPULE: .5; 2.5 SOLUTION RESPIRATORY (INHALATION) at 16:49

## 2022-07-24 RX ADMIN — FAMOTIDINE 20 MG: 10 INJECTION, SOLUTION INTRAVENOUS at 11:12

## 2022-07-24 RX ADMIN — MORPHINE SULFATE 4 MG: 4 INJECTION, SOLUTION INTRAMUSCULAR; INTRAVENOUS at 18:29

## 2022-07-24 ASSESSMENT — PAIN SCALES - GENERAL
PAINLEVEL_OUTOF10: 8
PAINLEVEL_OUTOF10: 10
PAINLEVEL_OUTOF10: 9
PAINLEVEL_OUTOF10: 8
PAINLEVEL_OUTOF10: 7

## 2022-07-24 ASSESSMENT — PAIN DESCRIPTION - ORIENTATION
ORIENTATION: LOWER
ORIENTATION: LOWER
ORIENTATION: POSTERIOR

## 2022-07-24 ASSESSMENT — PAIN DESCRIPTION - DESCRIPTORS
DESCRIPTORS: ACHING;DISCOMFORT;DULL
DESCRIPTORS: ACHING
DESCRIPTORS: ACHING;DISCOMFORT;DULL
DESCRIPTORS: SORE;TENDER;ACHING

## 2022-07-24 ASSESSMENT — PAIN - FUNCTIONAL ASSESSMENT
PAIN_FUNCTIONAL_ASSESSMENT: PREVENTS OR INTERFERES SOME ACTIVE ACTIVITIES AND ADLS

## 2022-07-24 ASSESSMENT — PAIN DESCRIPTION - LOCATION
LOCATION: ABDOMEN
LOCATION: NECK
LOCATION: ABDOMEN
LOCATION: NECK

## 2022-07-24 NOTE — PROGRESS NOTES
[address] [place] [address] [facility]  Progress Note    Covering for Dr Eb Sevilla       Chief complaint: Numbness in the upper extremities and difficulty ambulating      SUBJECTIVE:  Patient is tolerating medications. No reported adverse drug reactions. No nausea, vomiting, diarrhea. Afebrile   Review of systems:  As stated above in the chief complaint, otherwise negative. Medications:  Scheduled Meds:   ipratropium-albuterol  1 ampule Inhalation Q4H WA    enoxaparin  40 mg SubCUTAneous Daily    sodium chloride flush  5-40 mL IntraVENous 2 times per day    famotidine  20 mg Oral BID    Or    famotidine (PEPCID) injection  20 mg IntraVENous BID    ampicillin-sulbactam  3,000 mg IntraVENous Q6H     Continuous Infusions:   dextrose      sodium chloride      sodium chloride       PRN Meds:hydrALAZINE, glucose, dextrose bolus **OR** dextrose bolus, glucagon (rDNA), dextrose, sodium chloride, sodium chloride flush, sodium chloride, ondansetron **OR** ondansetron, morphine **OR** morphine, diphenhydrAMINE **OR** diphenhydrAMINE, bisacodyl, fleet    OBJECTIVE:  BP (!) 156/99   Pulse 72   Temp 98.4 °F (36.9 °C) (Temporal)   Resp 18   Ht 5' 9\" (1.753 m)   Wt 125 lb 14.4 oz (57.1 kg)   SpO2 97%   BMI 18.59 kg/m²   Temp  Av.2 °F (36.8 °C)  Min: 97.9 °F (36.6 °C)  Max: 98.5 °F (36.9 °C)  Constitutional: The patient is awake, alert, and oriented. Skin: Warm and dry. No rashes were noted. HEENT: Round and reactive pupils. Moist mucous membranes. No ulcerations or thrush. Neck: Supple, no LN ,cervical collar in place   Chest: Bilateral rhonchi   Cardiovascular: S1 and S2 are rhythmic and regular. No murmurs appreciated. Abdomen: soft, non tender, bowel sound +  Peg- functioning   Genitourinary: Male  Extremities: No clubbing, no cyanosis, no edema.   Lines: peripheral    Laboratory and Tests Review:  Lab Results   Component Value Date    WBC 6.6 2022    WBC 7.7 2022    WBC 8.2 07/22/2022    HGB 11.0 (L) 07/24/2022    HCT 33.6 (L) 07/24/2022    MCV 85.1 07/24/2022     07/24/2022     Lab Results   Component Value Date    NEUTROABS 10.74 (H) 07/21/2022    NEUTROABS 10.91 (H) 07/20/2022    NEUTROABS 2.44 07/13/2022     No results found for: CRPHS  Lab Results   Component Value Date    ALT 37 07/23/2022    AST 27 07/23/2022    ALKPHOS 52 07/23/2022    BILITOT 1.1 07/23/2022     Lab Results   Component Value Date/Time     07/24/2022 06:05 AM    K 3.9 07/24/2022 06:05 AM    K 4.0 07/13/2022 09:45 AM     07/24/2022 06:05 AM    CO2 24 07/24/2022 06:05 AM    BUN 15 07/24/2022 06:05 AM    CREATININE 0.7 07/24/2022 06:05 AM    CREATININE 0.8 07/23/2022 06:08 AM    CREATININE 0.6 07/22/2022 05:41 AM    GFRAA >60 07/24/2022 06:05 AM    LABGLOM >60 07/24/2022 06:05 AM    GLUCOSE 102 07/24/2022 06:05 AM    PROT 6.2 07/23/2022 06:08 AM    LABALBU 3.2 07/23/2022 06:08 AM    CALCIUM 8.6 07/24/2022 06:05 AM    BILITOT 1.1 07/23/2022 06:08 AM    ALKPHOS 52 07/23/2022 06:08 AM    AST 27 07/23/2022 06:08 AM    ALT 37 07/23/2022 06:08 AM     No results found for: CRP  No results found for: [address]  Radiology:      Microbiology:   No results found for: BC, ORG  No results found for: [facility]  No results found for: WNDABS  No results found for: RESPSMEAR  No results found for: Humberto Potters, LABLEGI, AFBCX, FUNGSM, LABFUNG  No results found for: CULTRESP  No results found for: CXCATHTIP  No results found for: BFCS  No results found for: CXSURG  Urine Culture, Routine   Date Value Ref Range Status   07/18/2022 Growth not present  Final     MRSA Culture Only   Date Value Ref Range Status   07/13/2022 Methicillin resistant Staph aureus not isolated  Final       ASSESSMENT:  Status post cervical fusion with a pharyngeal/esophageal tear  Patient is a high risk for aspiration as noted also only by his physical exam and upper respiratory secretions and wheezing but the fact that he can even move the inspirometer muriel    PLAN:  Continue Unasyn 3 grams IV q 6 hrs  day 6 /10 days  Monitor labs    Lady Rocco MD  11:00 AM  7/24/2022

## 2022-07-24 NOTE — PROGRESS NOTES
Department of Neurosurgery  Progress Note    CHIEF COMPLAINT: s/p anterior/posterior cervical fusion    SUBJECTIVE:  s/p PEG    REVIEW OF SYSTEMS :  Constitutional: Negative for chills and fever. Neurological: Negative for dizziness, tremors and speech change.      OBJECTIVE:   VITALS:  BP (!) 156/99   Pulse 72   Temp 98.4 °F (36.9 °C) (Temporal)   Resp 18   Ht 5' 9\" (1.753 m)   Wt 125 lb 14.4 oz (57.1 kg)   SpO2 97%   BMI 18.59 kg/m²   PHYSICAL:  AAO x conversant  Speech clear  Face symmetric GI/FT  Tongue MDL  SS symm and strong  SOLIMAN-C  Collar in place  Incision: c/d/i  D/c'd drain at bedside without incident      DATA:  CBC:   Lab Results   Component Value Date/Time    WBC 6.6 07/24/2022 06:05 AM    RBC 3.95 07/24/2022 06:05 AM    HGB 11.0 07/24/2022 06:05 AM    HCT 33.6 07/24/2022 06:05 AM    MCV 85.1 07/24/2022 06:05 AM    MCH 27.8 07/24/2022 06:05 AM    MCHC 32.7 07/24/2022 06:05 AM    RDW 15.7 07/24/2022 06:05 AM     07/24/2022 06:05 AM    MPV 9.9 07/24/2022 06:05 AM     BMP:    Lab Results   Component Value Date/Time     07/24/2022 06:05 AM    K 3.9 07/24/2022 06:05 AM    K 4.0 07/13/2022 09:45 AM     07/24/2022 06:05 AM    CO2 24 07/24/2022 06:05 AM    BUN 15 07/24/2022 06:05 AM    LABALBU 3.2 07/23/2022 06:08 AM    CREATININE 0.7 07/24/2022 06:05 AM    CALCIUM 8.6 07/24/2022 06:05 AM    GFRAA >60 07/24/2022 06:05 AM    LABGLOM >60 07/24/2022 06:05 AM    GLUCOSE 102 07/24/2022 06:05 AM     PT/INR:    Lab Results   Component Value Date/Time    PROTIME 11.7 07/13/2022 09:45 AM    INR 1.1 07/13/2022 09:45 AM     PTT:    Lab Results   Component Value Date/Time    APTT 30.0 09/09/2020 01:35 PM   [APTT}    Current Inpatient Medications  Current Facility-Administered Medications: hydrALAZINE (APRESOLINE) injection 10 mg, 10 mg, IntraVENous, Q6H PRN  ipratropium-albuterol (DUONEB) nebulizer solution 1 ampule, 1 ampule, Inhalation, Q4H WA  enoxaparin (LOVENOX) injection 40 mg, 40 mg,

## 2022-07-24 NOTE — PROGRESS NOTES
Hospitalist Progress Note      SYNOPSIS: Patient admitted on 2022 for back pain. Mr. Man Contreras, a 75y.o. 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 Possible pharyngeal/esophageal tear and patient was started on Unasyn on  with a plan to continue it for 10 days. Patient underwent PEG tube placement on  and tube feeds were started the same night. SUBJECTIVE:    Patient seen and examined in his room. Denies any chest pain, nausea, vomiting. No major overnight events. Records reviewed. Stable overnight. No other overnight issues reported. Temp (24hrs), Av.1 °F (36.7 °C), Min:97.7 °F (36.5 °C), Max:98.5 °F (36.9 °C)    DIET: Diet NPO Exceptions are: Sips of Water with Meds  ADULT TUBE FEEDING; PEG; Standard with Fiber; Continuous; 10; Yes; 10; Q 6 hours; 60; 150; Q 4 hours  CODE: Prior    Intake/Output Summary (Last 24 hours) at 2022 0824  Last data filed at 2022 0422  Gross per 24 hour   Intake 833 ml   Output 560 ml   Net 273 ml       OBJECTIVE:    BP (!) 146/99   Pulse 80   Temp 97.9 °F (36.6 °C) (Temporal)   Resp 17   Ht 5' 9\" (1.753 m)   Wt 125 lb 14.4 oz (57.1 kg)   SpO2 93%   BMI 18.59 kg/m²     General appearance: No apparent distress, appears stated age and cooperative. HEENT:  Conjunctivae/corneas clear. Neck: Supple. No jugular venous distention. Respiratory: Clear to auscultation bilaterally, normal respiratory effort  Cardiovascular: Regular rate rhythm, normal S1-S2  Abdomen: Soft, nontender, nondistended  Musculoskeletal: No clubbing, cyanosis, no bilateral lower extremity edema. Brisk capillary refill.    Skin:  No rashes  on visible skin  Neurologic: awake, alert and following commands     ASSESSMENT & PLAN:    ASSESSMENT:  ANTERIOR C3-C4, C4-C5,  AND C5-C6 0.8 0.7   CALCIUM 8.4* 8.6 8.6       Recent Labs     07/23/22  0608   PROT 6.2*   ALKPHOS 52   ALT 37   AST 27   BILITOT 1.1       No results for input(s): INR in the last 72 hours. No results for input(s): Maryanne Fuchs in the last 72 hours. Chronic labs:    Lab Results   Component Value Date    INR 1.1 07/13/2022       Radiology: REVIEWED DAILY    +++++++++++++++++++++++++++++++++++++++++++++++++  Betty Lincoln MD  [facility] - [address] [place] [address]  +++++++++++++++++++++++++++++++++++++++++++++++++  NOTE: This report was transcribed using voice recognition software. Every effort was made to ensure accuracy; however, inadvertent computerized transcription errors may be present.

## 2022-07-25 ENCOUNTER — CLINICAL DOCUMENTATION (OUTPATIENT)
Dept: INFECTIOUS DISEASES | Age: 67
End: 2022-07-25

## 2022-07-25 VITALS
HEART RATE: 75 BPM | BODY MASS INDEX: 18.65 KG/M2 | SYSTOLIC BLOOD PRESSURE: 122 MMHG | WEIGHT: 125.9 LBS | RESPIRATION RATE: 16 BRPM | OXYGEN SATURATION: 95 % | HEIGHT: 69 IN | DIASTOLIC BLOOD PRESSURE: 70 MMHG | TEMPERATURE: 97.7 F

## 2022-07-25 LAB
ANION GAP SERPL CALCULATED.3IONS-SCNC: 12 MMOL/L (ref 7–16)
BUN BLDV-MCNC: 14 MG/DL (ref 6–23)
CALCIUM SERPL-MCNC: 8.8 MG/DL (ref 8.6–10.2)
CHLORIDE BLD-SCNC: 108 MMOL/L (ref 98–107)
CO2: 26 MMOL/L (ref 22–29)
CREAT SERPL-MCNC: 0.7 MG/DL (ref 0.7–1.2)
GFR AFRICAN AMERICAN: >60
GFR NON-AFRICAN AMERICAN: >60 ML/MIN/1.73
GLUCOSE BLD-MCNC: 110 MG/DL (ref 74–99)
HCT VFR BLD CALC: 34.8 % (ref 37–54)
HEMOGLOBIN: 11.5 G/DL (ref 12.5–16.5)
MCH RBC QN AUTO: 27.5 PG (ref 26–35)
MCHC RBC AUTO-ENTMCNC: 33 % (ref 32–34.5)
MCV RBC AUTO: 83.3 FL (ref 80–99.9)
METER GLUCOSE: 124 MG/DL (ref 74–99)
PDW BLD-RTO: 15.6 FL (ref 11.5–15)
PLATELET # BLD: 316 E9/L (ref 130–450)
PMV BLD AUTO: 10.2 FL (ref 7–12)
POTASSIUM SERPL-SCNC: 3.6 MMOL/L (ref 3.5–5)
RBC # BLD: 4.18 E12/L (ref 3.8–5.8)
SODIUM BLD-SCNC: 146 MMOL/L (ref 132–146)
WBC # BLD: 7.4 E9/L (ref 4.5–11.5)

## 2022-07-25 PROCEDURE — 6370000000 HC RX 637 (ALT 250 FOR IP): Performed by: NURSE PRACTITIONER

## 2022-07-25 PROCEDURE — 6370000000 HC RX 637 (ALT 250 FOR IP): Performed by: STUDENT IN AN ORGANIZED HEALTH CARE EDUCATION/TRAINING PROGRAM

## 2022-07-25 PROCEDURE — 2500000003 HC RX 250 WO HCPCS: Performed by: SURGERY

## 2022-07-25 PROCEDURE — 2580000003 HC RX 258: Performed by: SURGERY

## 2022-07-25 PROCEDURE — 94640 AIRWAY INHALATION TREATMENT: CPT

## 2022-07-25 PROCEDURE — 36415 COLL VENOUS BLD VENIPUNCTURE: CPT

## 2022-07-25 PROCEDURE — 85027 COMPLETE CBC AUTOMATED: CPT

## 2022-07-25 PROCEDURE — 80048 BASIC METABOLIC PNL TOTAL CA: CPT

## 2022-07-25 PROCEDURE — 6360000002 HC RX W HCPCS: Performed by: SURGERY

## 2022-07-25 PROCEDURE — 12052 INTMD RPR FACE/MM 2.6-5.0 CM: CPT | Performed by: OTOLARYNGOLOGY

## 2022-07-25 PROCEDURE — 6370000000 HC RX 637 (ALT 250 FOR IP): Performed by: PHYSICIAN ASSISTANT

## 2022-07-25 PROCEDURE — A4216 STERILE WATER/SALINE, 10 ML: HCPCS | Performed by: SURGERY

## 2022-07-25 PROCEDURE — 82962 GLUCOSE BLOOD TEST: CPT

## 2022-07-25 PROCEDURE — 6360000002 HC RX W HCPCS: Performed by: NEUROLOGICAL SURGERY

## 2022-07-25 RX ORDER — OXYCODONE HCL 5 MG/5 ML
5 SOLUTION, ORAL ORAL EVERY 4 HOURS PRN
Qty: 210 ML | Refills: 0 | Status: SHIPPED | OUTPATIENT
Start: 2022-07-25 | End: 2022-08-01

## 2022-07-25 RX ORDER — POLYETHYLENE GLYCOL 3350 17 G/17G
17 POWDER, FOR SOLUTION ORAL DAILY
Qty: 30 EACH | Refills: 0 | Status: SHIPPED | OUTPATIENT
Start: 2022-07-25 | End: 2022-08-24

## 2022-07-25 RX ORDER — OXYCODONE HCL 5 MG/5 ML
5 SOLUTION, ORAL ORAL EVERY 4 HOURS PRN
Status: DISCONTINUED | OUTPATIENT
Start: 2022-07-25 | End: 2022-07-25 | Stop reason: HOSPADM

## 2022-07-25 RX ORDER — POLYETHYLENE GLYCOL 3350 17 G/17G
17 POWDER, FOR SOLUTION ORAL DAILY
Status: DISCONTINUED | OUTPATIENT
Start: 2022-07-25 | End: 2022-07-25 | Stop reason: HOSPADM

## 2022-07-25 RX ADMIN — MORPHINE SULFATE 4 MG: 4 INJECTION, SOLUTION INTRAMUSCULAR; INTRAVENOUS at 02:57

## 2022-07-25 RX ADMIN — MORPHINE SULFATE 4 MG: 4 INJECTION, SOLUTION INTRAMUSCULAR; INTRAVENOUS at 05:32

## 2022-07-25 RX ADMIN — HYDRALAZINE HYDROCHLORIDE 10 MG: 20 INJECTION INTRAMUSCULAR; INTRAVENOUS at 04:48

## 2022-07-25 RX ADMIN — FAMOTIDINE 20 MG: 10 INJECTION, SOLUTION INTRAVENOUS at 08:13

## 2022-07-25 RX ADMIN — IPRATROPIUM BROMIDE AND ALBUTEROL SULFATE 1 AMPULE: .5; 2.5 SOLUTION RESPIRATORY (INHALATION) at 11:35

## 2022-07-25 RX ADMIN — OXYCODONE HYDROCHLORIDE 5 MG: 5 SOLUTION ORAL at 12:06

## 2022-07-25 RX ADMIN — MAGNESIUM SULFATE: 1 CRYSTAL ORAL; TOPICAL at 11:54

## 2022-07-25 RX ADMIN — Medication 10 ML: at 08:14

## 2022-07-25 RX ADMIN — ENOXAPARIN SODIUM 40 MG: 100 INJECTION SUBCUTANEOUS at 08:12

## 2022-07-25 RX ADMIN — AMPICILLIN SODIUM AND SULBACTAM SODIUM 3000 MG: 2; 1 INJECTION, POWDER, FOR SOLUTION INTRAMUSCULAR; INTRAVENOUS at 04:35

## 2022-07-25 RX ADMIN — IPRATROPIUM BROMIDE AND ALBUTEROL SULFATE 1 AMPULE: .5; 2.5 SOLUTION RESPIRATORY (INHALATION) at 07:56

## 2022-07-25 ASSESSMENT — PAIN SCALES - GENERAL
PAINLEVEL_OUTOF10: 6
PAINLEVEL_OUTOF10: 9
PAINLEVEL_OUTOF10: 5
PAINLEVEL_OUTOF10: 10

## 2022-07-25 ASSESSMENT — PAIN DESCRIPTION - LOCATION
LOCATION: ABDOMEN;NECK
LOCATION: BACK;NECK
LOCATION: ABDOMEN;NECK

## 2022-07-25 ASSESSMENT — PAIN DESCRIPTION - ORIENTATION
ORIENTATION: LOWER
ORIENTATION: POSTERIOR
ORIENTATION: ANTERIOR

## 2022-07-25 ASSESSMENT — PAIN DESCRIPTION - DESCRIPTORS
DESCRIPTORS: ACHING;DISCOMFORT;TENDER
DESCRIPTORS: ACHING;TENDER;SORE

## 2022-07-25 ASSESSMENT — PAIN - FUNCTIONAL ASSESSMENT
PAIN_FUNCTIONAL_ASSESSMENT: PREVENTS OR INTERFERES SOME ACTIVE ACTIVITIES AND ADLS
PAIN_FUNCTIONAL_ASSESSMENT: ACTIVITIES ARE NOT PREVENTED
PAIN_FUNCTIONAL_ASSESSMENT: PREVENTS OR INTERFERES SOME ACTIVE ACTIVITIES AND ADLS

## 2022-07-25 NOTE — PROGRESS NOTES
Department of Neurosurgery  Progress Note    CHIEF COMPLAINT: s/p anterior/posterior cervical fusion    SUBJECTIVE:  c/o op site pain    REVIEW OF SYSTEMS :  Constitutional: Negative for chills and fever. Neurological: Negative for dizziness, tremors and speech change.      OBJECTIVE:   VITALS:  /70   Pulse 75   Temp 97.7 °F (36.5 °C) (Temporal)   Resp 16   Ht 5' 9\" (1.753 m)   Wt 125 lb 14.4 oz (57.1 kg)   SpO2 95%   BMI 18.59 kg/m²   PHYSICAL:  CONSTITUTIONAL:  awake, alert, cooperative, no apparent distress, and appears stated age    DATA:  CBC:   Lab Results   Component Value Date/Time    WBC 7.4 07/25/2022 05:46 AM    RBC 4.18 07/25/2022 05:46 AM    HGB 11.5 07/25/2022 05:46 AM    HCT 34.8 07/25/2022 05:46 AM    MCV 83.3 07/25/2022 05:46 AM    MCH 27.5 07/25/2022 05:46 AM    MCHC 33.0 07/25/2022 05:46 AM    RDW 15.6 07/25/2022 05:46 AM     07/25/2022 05:46 AM    MPV 10.2 07/25/2022 05:46 AM     BMP:    Lab Results   Component Value Date/Time     07/25/2022 05:46 AM    K 3.6 07/25/2022 05:46 AM    K 4.0 07/13/2022 09:45 AM     07/25/2022 05:46 AM    CO2 26 07/25/2022 05:46 AM    BUN 14 07/25/2022 05:46 AM    LABALBU 3.2 07/23/2022 06:08 AM    CREATININE 0.7 07/25/2022 05:46 AM    CALCIUM 8.8 07/25/2022 05:46 AM    GFRAA >60 07/25/2022 05:46 AM    LABGLOM >60 07/25/2022 05:46 AM    GLUCOSE 110 07/25/2022 05:46 AM     PT/INR:    Lab Results   Component Value Date/Time    PROTIME 11.7 07/13/2022 09:45 AM    INR 1.1 07/13/2022 09:45 AM     PTT:    Lab Results   Component Value Date/Time    APTT 30.0 09/09/2020 01:35 PM   [APTT}    Current Inpatient Medications  Current Facility-Administered Medications: oxyCODONE (ROXICODONE) 5 MG/5ML solution 5 mg, 5 mg, Oral, Q4H PRN  polyethylene glycol (GLYCOLAX) packet 17 g, 17 g, Oral, Daily  magnesium hydroxide (MILK OF MAGNESIA) 400 MG/5ML suspension 30 mL, 30 mL, Oral, Once  magnesium-glycerin-water (1-2-3) enema, , Rectal, Once  hydrALAZINE (APRESOLINE) injection 10 mg, 10 mg, IntraVENous, Q6H PRN  ipratropium-albuterol (DUONEB) nebulizer solution 1 ampule, 1 ampule, Inhalation, Q4H WA  enoxaparin (LOVENOX) injection 40 mg, 40 mg, SubCUTAneous, Daily  glucose chewable tablet 16 g, 4 tablet, Oral, PRN  dextrose bolus 10% 125 mL, 125 mL, IntraVENous, PRN **OR** dextrose bolus 10% 250 mL, 250 mL, IntraVENous, PRN  glucagon (rDNA) injection 1 mg, 1 mg, SubCUTAneous, PRN  dextrose 10 % infusion, , IntraVENous, Continuous PRN  0.9 % sodium chloride infusion, , IntraVENous, PRN  sodium chloride flush 0.9 % injection 5-40 mL, 5-40 mL, IntraVENous, 2 times per day  sodium chloride flush 0.9 % injection 5-40 mL, 5-40 mL, IntraVENous, PRN  0.9 % sodium chloride infusion, , IntraVENous, PRN  ondansetron (ZOFRAN-ODT) disintegrating tablet 4 mg, 4 mg, Oral, Q8H PRN **OR** ondansetron (ZOFRAN) injection 4 mg, 4 mg, IntraVENous, Q6H PRN  morphine (PF) injection 2 mg, 2 mg, IntraVENous, Q2H PRN **OR** morphine sulfate (PF) injection 4 mg, 4 mg, IntraVENous, Q2H PRN  diphenhydrAMINE (BENADRYL) tablet 25 mg, 25 mg, Oral, Q6H PRN **OR** diphenhydrAMINE (BENADRYL) injection 25 mg, 25 mg, IntraVENous, Q6H PRN  bisacodyl (DULCOLAX) suppository 10 mg, 10 mg, Rectal, Daily PRN  fleet rectal enema 1 enema, 1 enema, Rectal, Daily PRN  famotidine (PEPCID) tablet 20 mg, 20 mg, Oral, BID **OR** famotidine (PEPCID) 20 mg in sodium chloride (PF) 10 mL injection, 20 mg, IntraVENous, BID  ampicillin-sulbactam (UNASYN) 3000 mg in 100 mL NS IVPB minibag, 3,000 mg, IntraVENous, Q6H    ASSESSMENT:   S/p C3-6 ACDF and C3-T1 PCF on 7/18 - stable  Pharyngeal perforation  PEG    PLAN:  PT/OT  WBAT with cervical collar  Pain control  ENT following  AB per ID      Electronically signed by RUBEN Olmedo on 7/25/2022 at 11:46 AM s/p anterior

## 2022-07-25 NOTE — PROGRESS NOTES
Called and spoke to Lexus Gama from [facility] Infectious Disease office. Requested to see what antibiotic patient is to discharge on.

## 2022-07-25 NOTE — PLAN OF CARE
Problem: Discharge Planning  Goal: Discharge to home or other facility with appropriate resources  Outcome: Completed     Problem: Nutrition Deficit:  Goal: Optimize nutritional status  Outcome: Completed

## 2022-07-25 NOTE — PROGRESS NOTES
Hospitalist Progress Note      SYNOPSIS: Patient admitted on 2022 for back pain. Mr. Brigitte Orozco, a 75y.o. 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 Possible pharyngeal/esophageal tear and patient was started on Unasyn on  with a plan to continue it for 10 days. Patient underwent PEG tube placement on  and tube feeds were started the same night. SUBJECTIVE:    Patient seen and examined at bedside. Still complaining of cervical pain and mild lower back pain  Denies any chest pain, nausea, vomiting. No major overnight events. Records reviewed. Stable overnight. No other overnight issues reported. Temp (24hrs), Av.5 °F (36.4 °C), Min:97.3 °F (36.3 °C), Max:97.7 °F (36.5 °C)    DIET: Diet NPO Exceptions are: Sips of Water with Meds  ADULT TUBE FEEDING; PEG; Standard with Fiber; Continuous; 10; Yes; 10; Q 6 hours; 60; 150; Q 4 hours  CODE: Prior    Intake/Output Summary (Last 24 hours) at 2022 1529  Last data filed at 2022 0448  Gross per 24 hour   Intake 1661 ml   Output 1300 ml   Net 361 ml       OBJECTIVE:    /70   Pulse 75   Temp 97.7 °F (36.5 °C) (Temporal)   Resp 16   Ht 5' 9\" (1.753 m)   Wt 125 lb 14.4 oz (57.1 kg)   SpO2 95%   BMI 18.59 kg/m²     General appearance: No apparent distress, appears stated age and cooperative. HEENT:  Conjunctivae/corneas clear. Neck: Supple. No jugular venous distention. Respiratory: Clear to auscultation bilaterally, normal respiratory effort  Cardiovascular: Regular rate rhythm, normal S1-S2  Abdomen: Soft, nontender, nondistended  Musculoskeletal: No clubbing, cyanosis, no bilateral lower extremity edema. Brisk capillary refill.    Skin:  No rashes  on visible skin  Neurologic: awake, alert and following commands     ASSESSMENT & PLAN:    ASSESSMENT:  ANTERIOR C3-C4, C4-C5,  AND C5-C6 CERVICAL DISCECTOMY AND FUSION   POSTERIOR C3-C7 LAMINECTOMY, POSTERIOR C3-T1 FUSION   PHARYNGEAL TEAR REPAIR   Appears medically stable  Hfx of prost ca  Hx of htn well conrolled  on lsinop and amlodip at home  Pharyngeal/esophageal tear     PLAN:  Once able to tolerate oral safely   Resume amlodipine and lisinopril  Continue Unasyn as per ID, started on 7/18  Patient seen by ENT on 7/21 due to hoarseness of his voice, dysphagia and secretions and patient underwent indirect endoscopy, showed secretions pooling but no obvious tear or abnormality  General surgery consulted for the placement of PEG tube  Replaced potassium with KCl 20 mg IV x1  Bilirubin slightly elevated to 2.1, repeat CMP on the morning of 7/23, resolved  Status post PEG tube placement on 7/22, already started on tube feeds the same day. DISPOSITION:   Medically stable for discharge. Neurosurgery primary team for final orders. Plan to discharge to subacute rehab on 7/25.     Medications:  REVIEWED DAILY    Infusion Medications    dextrose      sodium chloride      sodium chloride       Scheduled Medications    polyethylene glycol  17 g Oral Daily    magnesium hydroxide  30 mL Oral Once    ipratropium-albuterol  1 ampule Inhalation Q4H WA    enoxaparin  40 mg SubCUTAneous Daily    sodium chloride flush  5-40 mL IntraVENous 2 times per day    famotidine  20 mg Oral BID    Or    famotidine (PEPCID) injection  20 mg IntraVENous BID    ampicillin-sulbactam  3,000 mg IntraVENous Q6H     PRN Meds: oxyCODONE, hydrALAZINE, glucose, dextrose bolus **OR** dextrose bolus, glucagon (rDNA), dextrose, sodium chloride, sodium chloride flush, sodium chloride, ondansetron **OR** ondansetron, morphine **OR** morphine, diphenhydrAMINE **OR** diphenhydrAMINE, bisacodyl, fleet    Labs:     Recent Labs     07/23/22  0608 07/24/22  0605 07/25/22  0546   WBC 7.7 6.6 7.4   HGB 11.1* 11.0* 11.5*   HCT 33.2* 33.6* 34.8*    280 316       Recent Labs     07/23/22  0608 07/24/22  0605 07/25/22  0546    141 146   K 4.0 3.9 3.6   * 107 108*   CO2 21* 24 26   BUN 24* 15 14   CREATININE 0.8 0.7 0.7   CALCIUM 8.6 8.6 8.8       Recent Labs     07/23/22  0608   PROT 6.2*   ALKPHOS 52   ALT 37   AST 27   BILITOT 1.1       No results for input(s): INR in the last 72 hours. No results for input(s): Janelle Parisian in the last 72 hours. Chronic labs:    Lab Results   Component Value Date    INR 1.1 07/13/2022       Radiology: REVIEWED DAILY    +++++++++++++++++++++++++++++++++++++++++++++++++  Kayla Wood MD  [facility] Physician - [address] [place] [address]  +++++++++++++++++++++++++++++++++++++++++++++++++  NOTE: This report was transcribed using voice recognition software. Every effort was made to ensure accuracy; however, inadvertent computerized transcription errors may be present.

## 2022-07-25 NOTE — PROGRESS NOTES
Physician Progress Note      Jacky Peterson  CSN #:                  [id number]  :                       1955  ADMIT DATE:       2022 5:24 AM  DISCH DATE:  RESPONDING  PROVIDER #:        Joel Geller MD          QUERY TEXT:    Noted documentation of Severe Malnutrition by dietician consult. If possible,   please document in progress notes and discharge summary:    The medical record reflects the following:  Risk Factors: chronic illness, pain, dysphagia  Clinical Indicators: Dietician consult note 22 \"Severe malnutrition, In   context of chronic illness related to pain (pain/weakness ongoing for   1 yr) as evidenced by Criteria as identified in malnutrition assessment. Energy Intake:  75% or less estimated energy requirements for 1 month or   longer. Weight Loss:  Mild weight loss (specify amount and time period)   (#, 3/24/# equates to   8% wt loss in   4 mo (avg of   6% in   3 mo)). Body Fat Loss:   (moderate) Orbital, Triceps, Buccal region. Muscle   Mass Loss:  Severe muscle mass loss Clavicles (pectoralis & deltoids), Thigh   (quadraceps), Calf (gastrocnemius). \" BMI 18.59. Treatment: Dietician consult, peg and tube feedings, I/Os    Thank you,  Brunilda Hines, RN, BSN, CCDS, Clinical Documentation Improvement  Options provided:  -- Severe Malnutrition confirmed  -- Severe Malnutrition ruled out  -- Other - I will add my own diagnosis  -- Disagree - Not applicable / Not valid  -- Disagree - Clinically unable to determine / Unknown  -- Refer to Clinical Documentation Reviewer    PROVIDER RESPONSE TEXT:    The diagnosis of Severe Malnutrition was confirmed.     Query created by: Figueroa Cardoza on 2022 12:32 PM      Electronically signed by:  Joel Geller MD 2022 1:16 PM

## 2022-07-25 NOTE — CARE COORDINATION
7/25/22 Update CM Note: Patient is discharged and will go to [facility] today. [facility] set up to transport via stretcher to [facility] with pickup time set for 1:30. Envelope completed and no covid is needed.  Electronically signed by Arturo Hendricks RN CM on 7/25/2022 at 10:04 AM

## 2022-07-25 NOTE — DISCHARGE SUMMARY
Discharge Summary    Anthony Cornejo SUMMARY:                The patient is a 75 y.o. male who was admitted to the hospital on 7/18/2022  5:24 AM for treatment of neck pain. On the day of admission, a cervical fusion was performed. The patient's hospital course was complicated by an unintended pharyngeal perforation requiring ENT closure and PEG tube for temporary nutrition, since TPN was not a good option. Physical therapy and incision observation was performed for the duration of the hospital stay. The patient was discharged on 7/25/2022  3:39 PM moving bowels, and urinating without difficulty. The incisions were clean and intact. The patient was discharged to skilled nursing facility in satisfactory condition with instructions to call the office for a follow up appointment. Hospital Problem List:  Principal Problem:    Cervical spinal stenosis  Active Problems:    Cervical stenosis of spinal canal    Cervical myelopathy (HCC)    Cervical stenosis of spine    Pre-operative laboratory examination    Severe protein-calorie malnutrition ([address]  Resolved Problems:    * No resolved hospital problems. *     Procedure(s) (LRB):  LAPAROSCOPIC POSSIBLE OPEN PLACEMENT OF GASTROSTOMY TUBE WANTS AS EARLY AS POSSIBLE TO FOLLOW OTHER ADDONS (N/A)    Discharge Medications:      Medication List        START taking these medications      oxyCODONE 5 MG/5ML solution  Commonly known as: ROXICODONE  Take 5 mLs by mouth every 4 hours as needed for Pain for up to 7 days. polyethylene glycol 17 g packet  Commonly known as: GLYCOLAX  Take 17 g by mouth in the morning.             CONTINUE taking these medications      amLODIPine 10 MG tablet  Commonly known as: NORVASC     docusate 100 MG Caps  Commonly known as: COLACE, DULCOLAX     lisinopril 2.5 MG tablet  Commonly known as: PRINIVIL;ZESTRIL     sildenafil 100 MG tablet  Commonly known as: VIAGRA     tamsulosin 0.4 MG capsule  Commonly known as: FLOMAX     vitamin D 50 MCG (2000 UT) Caps capsule            STOP taking these medications      ibuprofen 800 MG tablet  Commonly known as: IBU               Where to Get Your Medications        These medications were sent to [facility] [address] [place] [address] [address] [place] [address] [phone] Emily Agudelo [phone]  [address] [facility] [address]      Phone: [phone]   polyethylene glycol 17 g packet       You can get these medications from any pharmacy    Bring a paper prescription for each of these medications  oxyCODONE 5 MG/5ML solution         Kay Sarmientoma  7/25/2022

## 2022-07-25 NOTE — PROGRESS NOTES
Called Dr Franko Kaye to inquire about antibiotics at discharge. Patient set up to discharge at 1330 today. Unable to get through to answering service. Dr Sudhir Stuart sent message to Dr Franko Kaye.

## 2022-07-28 PROBLEM — T81.31XA: Status: ACTIVE | Noted: 2022-07-28

## 2022-07-28 NOTE — OP NOTE
Operative Note      Patient: Elle Mcnair  YOB: 1955  MRN: [id number]    Date of Procedure: 7/18/2022    Pre-Op Diagnosis: C3-C4, C4-C5, C5-C6 HERNIATED DISC  C3-C7 STENOSIS    Post-Op Diagnosis: Same , pharyngeal dehiscence       Procedure(s):  ANTERIOR C3-C4, C4-C5,  AND C5-C6 CERVICAL DISCECTOMY AND FUSION  POSTERIOR C3-C7 LAMINECTOMY, POSTERIOR C3-T1 FUSION  PHARYNGEAL TEAR REPAIR, closure of pharyngeal tissue    Surgeon(s):  Glorya Sicard, MD Johnna Brackett, MD    Assistant:   Resident: Guero Felder DO    Anesthesia: General    Estimated Blood Loss (mL): less than 50     Complications: None    Specimens:   ID Type Source Tests Collected by Time Destination   A : CERVICAL DISC Tissue Spine SURGICAL PATHOLOGY Glorya Sicard, MD 7/18/2022 8378        Implants:  Implant Name Type Inv. Item Serial No.  Lot No. LRB No. Used Action   GRAFT BNE SUB [id number] CANC STANISLAV ANT CERV INTBDY FUS - [id number]  GRAFT BNE SUB [id number] CANC STANISLAV ANT CERV INTBDY FUS [id number] [employer]  N/A 1 Implanted   GRAFT BNE SUB [id number] CANC STANISLAV ANT CERV INTBDY FUS - [id number]  GRAFT BNE SUB [id number] CANC STANISLAV ANT CERV INTBDY FUS [id number] [employer]-  N/A 1 Implanted   GRAFT BNE SUB [id number] CANC STANISLAV ANT CERV INTBDY FUS - [id number]  GRAFT BNE SUB [id number] CANC STANISLAV ANT CERV INTBDY FUS [id number] [employer]  N/A 1 Implanted   GRAFT BONE SUB 10CC DEMIN BONE MTRX PLUSXEMPLIFI - [id number]  GRAFT BONE SUB 10CC DEMIN BONE MTRX PLUSXEMPLIFI [id number] GLOBUS [employer]ICAL [employer]-  N/A 1 Implanted   PLATE SPNL M83MH ANT CERV TI ALLY LEV 3 XTEND - [id number]  PLATE SPNL Y13BL ANT CERV TI ALLY LEV 3 XTEND  [employer]WD  N/A 1 Implanted   SCREW SPNL L16MM DIA4. 6MM ANT CERV SELF DRL REE ANG W/ SGL - [id number]  SCREW SPNL L16MM DIA4. 6MM ANT CERV SELF DRL REE ANG W/ SGL  [employer]WD  N/A 1 Implanted   SCREW SPNL L16MM DIA4. 2MM ANT CERV TI ALLY SELF DRL REE ANG - [id number]  SCREW SPNL L16MM DIA4. 2MM ANT CERV TI ALLY SELF DRL ERE ANG  [employer]-  N/A 5 Implanted   SCREW SPNL L14MM DIA4. 2MM ANT CERV TI ALLY SELF DRL REE ANG - [id number]  SCREW SPNL L14MM DIA4. 2MM ANT CERV TI ALLY SELF DRL REE ANG  [employer]-  N/A 2 Implanted   SCREW SPNL L26MM OD5. 5MM POLYAX QUARTEX - [id number]  SCREW SPNL L26MM OD5. 5MM POLYAX QUARTEX  [employer]-WD  N/A 2 Implanted   SCREW SPNL L14MM DIA3. 5MM POLYAX QUARTEX - [id number]  SCREW SPNL L14MM DIA3. 5MM POLYAX QUARTEX  [employer]-  N/A 8 Implanted   CAP SPNL OCCIPITOCERVICOTHORACIC MONE THRD QUARTEX - [id number]  CAP SPNL OCCIPITOCERVICOTHORACIC MONE THRD QUARTEX  [employer]-  N/A 10 Implanted   KENAN SPNL L80MM DIA3.5MM CVD CAPITOL - [id number]  KENAN SPNL L80MM DIA3.5MM CVD CAPITOL  [employer]-  N/A 1 Implanted   KENAN SPNL L90MM OD3. 5MM CVD CAPITOL - [id number]  KENAN SPNL L90MM OD3. 5MM CVD CAPITOL  [employer]-  N/A 1 Implanted   SCREW SPNL L16MM DIA4. 2MM ANT CERV TI ALLY SELF DRL REE ANG - [id number]  SCREW SPNL L16MM DIA4. 2MM ANT CERV TI ALLY SELF DRL REE ANG  [employer] Match-  N/A 1 Implanted         Drains:   Gastrostomy/Enterostomy/Jejunostomy Tube Gastrostomy LUQ 1 22 fr (Active)   Drainage Appearance None 07/24/22 2030   External Catheter Length (cm) 2 cm 07/22/22 1058   Site Description Clean, dry & intact; Clean;Dry; Intact 07/25/22 0800   G Port Status Infusing 07/24/22 2030   Surrounding Skin Clean, dry & intact 07/24/22 2030   Dressing Status Old drainage noted 07/25/22 0800   Dressing Type Split gauze 07/25/22 0800   Tube Feeding Standard with Fiber 07/24/22 2030   Tube feeding/verify rate (mL/hr) 60 mL/hr 07/24/22 2030   Tube Feeding Intake (mL) 1361 ml 07/24/22 1801   Free Water/Flush (mL) 300 mL 07/24/22 1801   Residual Volume (ml) 10 ml 07/25/22 0800       [REMOVED] Closed/Suction Drain Posterior Neck Accordion (Removed)   Site Description Unable to view 07/24/22 0430   Dressing Status Clean, dry & intact 07/24/22 0430   Drainage Appearance Serous 07/24/22 0430   Drain Status Compressed 07/24/22 0430   Output (ml) 10 ml 07/23/22 1813       [REMOVED] Urinary Catheter Miranda (Removed)   $ Urethral catheter insertion Inserted for procedure 07/18/22 1336   Catheter Indications Perioperative use for selected surgical procedures 07/20/22 1515   Site Assessment No urethral drainage 07/20/22 1515   Urine Color Vandana 07/20/22 1515   Urine Appearance Hazy 07/20/22 1515   Collection Container Standard 07/20/22 1515   Securement Method Securing device (Describe) 07/20/22 1515   Catheter Care Completed Yes 07/19/22 1955   Catheter Best Practices  Drainage tube clipped to bed;Catheter secured to thigh; Bag below bladder;Drainage bag less than half full;Lack of dependent loop in tubing;Bag not on floor; Tamper seal intact 07/20/22 1515   Status Draining;Patent 07/20/22 1515   Output (mL) 575 mL 07/20/22 0830   Discontinuation Reason Per provider order 07/20/22 0830       Findings: right piriform dehiscence    Detailed Description of Procedure:     Called for an intraoperative consult to evaluate airway. a dehiscecne in the right piriform was Identified. It appeared to be clean and no connection below the cricoid. Decision was made to finish anterior repair, then do posterior repair with the anterior neck packed and then close all at once. After spine work was completed, the area was irrigated out and mucosal edges closed of piriform with horizontal mattrress 3.0 vicryl to close the area of around 4 cm. It closed well. A peroxide test in the oral cavity was done with a small area superiolry that had peroxide seen. Another full layer closure was done with the peroxide test now fully negative. A final layer of straps was oversewn the area and no peroxide still seen.   The rest of the incision was closed by the primary team.        Electronically signed by Lupe Juárez MD on 7/28/2022 at 2:03 PM

## 2022-08-05 LAB
ALBUMIN SERPL-MCNC: 3.3 G/DL (ref 3.5–5.2)
ALP BLD-CCNC: 72 U/L (ref 40–129)
ALT SERPL-CCNC: 32 U/L (ref 0–40)
ANION GAP SERPL CALCULATED.3IONS-SCNC: 12 MMOL/L (ref 7–16)
AST SERPL-CCNC: 20 U/L (ref 0–39)
BASOPHILS ABSOLUTE: 0.03 E9/L (ref 0–0.2)
BASOPHILS RELATIVE PERCENT: 0.5 % (ref 0–2)
BILIRUB SERPL-MCNC: 0.4 MG/DL (ref 0–1.2)
BUN BLDV-MCNC: 17 MG/DL (ref 6–23)
CALCIUM SERPL-MCNC: 9.1 MG/DL (ref 8.6–10.2)
CHLORIDE BLD-SCNC: 98 MMOL/L (ref 98–107)
CO2: 27 MMOL/L (ref 22–29)
CREAT SERPL-MCNC: 0.7 MG/DL (ref 0.7–1.2)
EOSINOPHILS ABSOLUTE: 0.25 E9/L (ref 0.05–0.5)
EOSINOPHILS RELATIVE PERCENT: 4.4 % (ref 0–6)
GFR AFRICAN AMERICAN: >60
GFR NON-AFRICAN AMERICAN: >60 ML/MIN/1.73
GLUCOSE BLD-MCNC: 109 MG/DL (ref 74–99)
HCT VFR BLD CALC: 32.1 % (ref 37–54)
HEMOGLOBIN: 10.6 G/DL (ref 12.5–16.5)
IMMATURE GRANULOCYTES #: 0.03 E9/L
IMMATURE GRANULOCYTES %: 0.5 % (ref 0–5)
LYMPHOCYTES ABSOLUTE: 0.89 E9/L (ref 1.5–4)
LYMPHOCYTES RELATIVE PERCENT: 15.6 % (ref 20–42)
MCH RBC QN AUTO: 27.6 PG (ref 26–35)
MCHC RBC AUTO-ENTMCNC: 33 % (ref 32–34.5)
MCV RBC AUTO: 83.6 FL (ref 80–99.9)
MONOCYTES ABSOLUTE: 0.55 E9/L (ref 0.1–0.95)
MONOCYTES RELATIVE PERCENT: 9.6 % (ref 2–12)
NEUTROPHILS ABSOLUTE: 3.96 E9/L (ref 1.8–7.3)
NEUTROPHILS RELATIVE PERCENT: 69.4 % (ref 43–80)
PDW BLD-RTO: 14.9 FL (ref 11.5–15)
PLATELET # BLD: 580 E9/L (ref 130–450)
PMV BLD AUTO: 9.3 FL (ref 7–12)
POTASSIUM SERPL-SCNC: 4.2 MMOL/L (ref 3.5–5)
RBC # BLD: 3.84 E12/L (ref 3.8–5.8)
SODIUM BLD-SCNC: 137 MMOL/L (ref 132–146)
TOTAL PROTEIN: 6.6 G/DL (ref 6.4–8.3)
VITAMIN D 25-HYDROXY: 30 NG/ML (ref 30–100)
WBC # BLD: 5.7 E9/L (ref 4.5–11.5)

## 2022-08-16 ENCOUNTER — HOSPITAL ENCOUNTER (OUTPATIENT)
Dept: GENERAL RADIOLOGY | Age: 67
Discharge: HOME | End: 2022-08-18
Payer: OTHER GOVERNMENT

## 2022-08-16 ENCOUNTER — HOSPITAL ENCOUNTER (OUTPATIENT)
Age: 67
Discharge: HOME | End: 2022-08-18
Payer: OTHER GOVERNMENT

## 2022-08-16 ENCOUNTER — OFFICE VISIT (OUTPATIENT)
Dept: NEUROSURGERY | Age: 67
End: 2022-08-16

## 2022-08-16 VITALS — DIASTOLIC BLOOD PRESSURE: 74 MMHG | HEART RATE: 84 BPM | SYSTOLIC BLOOD PRESSURE: 104 MMHG

## 2022-08-16 DIAGNOSIS — M48.02 CERVICAL SPINAL STENOSIS: ICD-10-CM

## 2022-08-16 DIAGNOSIS — G95.9 CERVICAL MYELOPATHY (HCC): ICD-10-CM

## 2022-08-16 DIAGNOSIS — Z98.1 S/P CERVICAL SPINAL FUSION: Primary | ICD-10-CM

## 2022-08-16 DIAGNOSIS — M48.02 CERVICAL SPINAL STENOSIS: Primary | ICD-10-CM

## 2022-08-16 PROCEDURE — 99024 POSTOP FOLLOW-UP VISIT: CPT | Performed by: PHYSICIAN ASSISTANT

## 2022-08-16 PROCEDURE — 72040 X-RAY EXAM NECK SPINE 2-3 VW: CPT

## 2022-08-16 NOTE — PROGRESS NOTES
Post Operative Follow-up     This is a 75year old male who presents to the office for a 1 month follow-up s/p C3-C6 ACDF and posterior C3-T1 fusion     Subjective: Patient [facility] from [facility] in a wheelchair. States overall he has been doing [employer]. \" Participates in PT and feels he is getting stronger. Pain controlled with medications. Collar compliance. No issues with incision site. Cervical XR reviewed. Physical Exam:              WDWN, no apparent distress              Non-labored breathing               Vitals Stable              Alert and oriented x3              CN 3-12 intact              PERRL              EOMI              BUE 4/5              Sensation to LT intact bilaterally   Incisions healing well with no signs of infection                   Imagin22 cervical XR: Stable alignment, stable fusion. No acute abnormalities noted. Final report pending. Assessment: This is a 75 y.o.  male presenting for a 1 month follow-up s/p C3-C6 ACDF and posterior C3-T1 fusion. Stable. Plan:  -Pain control and expectations discussed  -Continue collar and restrictions   -Only need to wear collar when ambulating and with PT/OT  -OARRS report reviewed   -Follow-up in neurosurgery clinic in 2 months for 3 month follow up with repeat cervical XR  -Call or return to neurosurgery office sooner if symptoms worsen or if new issues arise in the interim.     Electronically signed by Rachel Patel PA-C on 2022 at 4:23 PM

## 2022-08-17 ENCOUNTER — OFFICE VISIT (OUTPATIENT)
Dept: SURGERY | Age: 67
End: 2022-08-17

## 2022-08-17 VITALS
HEIGHT: 69 IN | BODY MASS INDEX: 18.07 KG/M2 | DIASTOLIC BLOOD PRESSURE: 79 MMHG | WEIGHT: 122 LBS | HEART RATE: 78 BPM | TEMPERATURE: 98.4 F | SYSTOLIC BLOOD PRESSURE: 105 MMHG

## 2022-08-17 DIAGNOSIS — Z48.89 ENCOUNTER FOR POSTOPERATIVE CARE: Primary | ICD-10-CM

## 2022-08-17 PROCEDURE — 99024 POSTOP FOLLOW-UP VISIT: CPT | Performed by: SURGERY

## 2022-08-17 RX ORDER — CYCLOBENZAPRINE HCL 5 MG
5 TABLET ORAL 2 TIMES DAILY
COMMUNITY
Start: 2022-08-05

## 2022-08-17 RX ORDER — OXYCODONE HYDROCHLORIDE 5 MG/1
5 TABLET ORAL EVERY 4 HOURS PRN
COMMUNITY

## 2022-08-21 PROBLEM — Z01.812 PRE-OPERATIVE LABORATORY EXAMINATION: Status: RESOLVED | Noted: 2022-07-22 | Resolved: 2022-08-21

## 2022-09-28 ENCOUNTER — TELEPHONE (OUTPATIENT)
Dept: NEUROSURGERY | Age: 67
End: 2022-09-28

## 2022-09-28 DIAGNOSIS — G95.9 CERVICAL MYELOPATHY (HCC): ICD-10-CM

## 2022-09-28 DIAGNOSIS — M48.02 CERVICAL SPINAL STENOSIS: Primary | ICD-10-CM

## 2022-09-28 DIAGNOSIS — R13.10 DYSPHAGIA, UNSPECIFIED TYPE: ICD-10-CM

## 2022-09-28 DIAGNOSIS — Z98.1 S/P CERVICAL SPINAL FUSION: ICD-10-CM

## 2022-09-28 NOTE — TELEPHONE ENCOUNTER
Patient says he feels like something is dislodged in his throat and it is hard to swallow. He is scheduled on 10/11/22.   He is currently at [facility] for rehab.  [phone] phone

## 2022-09-29 ENCOUNTER — TELEPHONE (OUTPATIENT)
Dept: NEUROSURGERY | Age: 67
End: 2022-09-29

## 2022-10-11 ENCOUNTER — OFFICE VISIT (OUTPATIENT)
Dept: NEUROSURGERY | Age: 67
End: 2022-10-11
Payer: OTHER GOVERNMENT

## 2022-10-11 ENCOUNTER — HOSPITAL ENCOUNTER (OUTPATIENT)
Dept: GENERAL RADIOLOGY | Age: 67
Discharge: HOME | End: 2022-10-13

## 2022-10-11 ENCOUNTER — HOSPITAL ENCOUNTER (OUTPATIENT)
Age: 67
Discharge: HOME | End: 2022-10-13

## 2022-10-11 VITALS
SYSTOLIC BLOOD PRESSURE: 102 MMHG | RESPIRATION RATE: 16 BRPM | TEMPERATURE: 98.6 F | HEART RATE: 88 BPM | BODY MASS INDEX: 18.49 KG/M2 | OXYGEN SATURATION: 95 % | HEIGHT: 68 IN | WEIGHT: 122 LBS | DIASTOLIC BLOOD PRESSURE: 78 MMHG

## 2022-10-11 DIAGNOSIS — Z98.1 S/P CERVICAL SPINAL FUSION: ICD-10-CM

## 2022-10-11 DIAGNOSIS — M48.02 CERVICAL SPINAL STENOSIS: Primary | ICD-10-CM

## 2022-10-11 DIAGNOSIS — G95.9 CERVICAL MYELOPATHY (HCC): ICD-10-CM

## 2022-10-11 PROCEDURE — 99024 POSTOP FOLLOW-UP VISIT: CPT | Performed by: PHYSICIAN ASSISTANT

## 2022-10-11 PROCEDURE — 72040 X-RAY EXAM NECK SPINE 2-3 VW: CPT

## 2022-10-11 PROCEDURE — 99212 OFFICE O/P EST SF 10 MIN: CPT

## 2022-10-11 NOTE — PROGRESS NOTES
Post Operative Follow-up    Patient is status post: cervical fusion. Mild op site pain. Arm weakness/numbness slowly improving. Physical Exam  Alert and Oriented X 3  PERRLA, EOMI  SOLIMAN. Bilateral arm weakness  Wound: C/D/I    A/P: patient is s/p C3-T1 PCF. X-rays stable. D/c collar. Continue with bone growth stimulator. No restrictions. F/u in 9 months. D/c collar.

## 2022-10-26 ENCOUNTER — TELEPHONE (OUTPATIENT)
Dept: NEUROSURGERY | Age: 67
End: 2022-10-26

## 2022-10-26 DIAGNOSIS — Z98.1 S/P CERVICAL SPINAL FUSION: Primary | ICD-10-CM

## 2022-10-26 DIAGNOSIS — M54.2 NECK PAIN: ICD-10-CM

## 2023-03-06 ENCOUNTER — APPOINTMENT (OUTPATIENT)
Dept: GENERAL RADIOLOGY | Age: 68
End: 2023-03-06
Payer: OTHER GOVERNMENT

## 2023-03-06 PROCEDURE — 71046 X-RAY EXAM CHEST 2 VIEWS: CPT

## 2023-03-06 PROCEDURE — 96374 THER/PROPH/DIAG INJ IV PUSH: CPT

## 2023-03-06 PROCEDURE — 83690 ASSAY OF LIPASE: CPT

## 2023-03-06 PROCEDURE — 99285 EMERGENCY DEPT VISIT HI MDM: CPT

## 2023-03-06 PROCEDURE — 96375 TX/PRO/DX INJ NEW DRUG ADDON: CPT

## 2023-03-06 PROCEDURE — 84484 ASSAY OF TROPONIN QUANT: CPT

## 2023-03-06 PROCEDURE — 85025 COMPLETE CBC W/AUTO DIFF WBC: CPT

## 2023-03-06 PROCEDURE — 83605 ASSAY OF LACTIC ACID: CPT

## 2023-03-06 PROCEDURE — 85378 FIBRIN DEGRADE SEMIQUANT: CPT

## 2023-03-06 PROCEDURE — 80053 COMPREHEN METABOLIC PANEL: CPT

## 2023-03-06 RX ORDER — 0.9 % SODIUM CHLORIDE 0.9 %
1000 INTRAVENOUS SOLUTION INTRAVENOUS ONCE
Status: DISCONTINUED | OUTPATIENT
Start: 2023-03-06 | End: 2023-03-07 | Stop reason: HOSPADM

## 2023-03-06 RX ORDER — ONDANSETRON 2 MG/ML
4 INJECTION INTRAMUSCULAR; INTRAVENOUS EVERY 6 HOURS PRN
Status: DISCONTINUED | OUTPATIENT
Start: 2023-03-06 | End: 2023-03-07 | Stop reason: HOSPADM

## 2023-03-06 RX ORDER — MORPHINE SULFATE 2 MG/ML
2 INJECTION, SOLUTION INTRAMUSCULAR; INTRAVENOUS ONCE
Status: COMPLETED | OUTPATIENT
Start: 2023-03-06 | End: 2023-03-07

## 2023-03-06 ASSESSMENT — PAIN DESCRIPTION - FREQUENCY: FREQUENCY: CONTINUOUS

## 2023-03-06 ASSESSMENT — LIFESTYLE VARIABLES
HOW OFTEN DO YOU HAVE A DRINK CONTAINING ALCOHOL: NEVER
HOW MANY STANDARD DRINKS CONTAINING ALCOHOL DO YOU HAVE ON A TYPICAL DAY: PATIENT DOES NOT DRINK

## 2023-03-06 ASSESSMENT — PAIN DESCRIPTION - DESCRIPTORS: DESCRIPTORS: BURNING;SHARP

## 2023-03-06 ASSESSMENT — PAIN SCALES - GENERAL: PAINLEVEL_OUTOF10: 10

## 2023-03-06 ASSESSMENT — PAIN DESCRIPTION - PAIN TYPE: TYPE: ACUTE PAIN

## 2023-03-06 ASSESSMENT — PAIN - FUNCTIONAL ASSESSMENT: PAIN_FUNCTIONAL_ASSESSMENT: 0-10

## 2023-03-06 ASSESSMENT — PAIN DESCRIPTION - LOCATION: LOCATION: RIB CAGE

## 2023-03-06 ASSESSMENT — PAIN DESCRIPTION - ORIENTATION: ORIENTATION: LEFT

## 2023-03-07 ENCOUNTER — HOSPITAL ENCOUNTER (EMERGENCY)
Age: 68
Discharge: HOME | End: 2023-03-07
Attending: EMERGENCY MEDICINE
Payer: OTHER GOVERNMENT

## 2023-03-07 ENCOUNTER — APPOINTMENT (OUTPATIENT)
Dept: CT IMAGING | Age: 68
End: 2023-03-07
Payer: OTHER GOVERNMENT

## 2023-03-07 VITALS
DIASTOLIC BLOOD PRESSURE: 98 MMHG | WEIGHT: 130 LBS | RESPIRATION RATE: 16 BRPM | BODY MASS INDEX: 19.77 KG/M2 | HEART RATE: 84 BPM | OXYGEN SATURATION: 99 % | TEMPERATURE: 98 F | SYSTOLIC BLOOD PRESSURE: 145 MMHG

## 2023-03-07 DIAGNOSIS — R07.81 RIB PAIN ON LEFT SIDE: Primary | ICD-10-CM

## 2023-03-07 DIAGNOSIS — I71.20 THORACIC AORTIC ANEURYSM WITHOUT RUPTURE, UNSPECIFIED PART: ICD-10-CM

## 2023-03-07 DIAGNOSIS — K59.00 CONSTIPATION, UNSPECIFIED CONSTIPATION TYPE: ICD-10-CM

## 2023-03-07 LAB
ALBUMIN SERPL-MCNC: 3.8 G/DL (ref 3.5–5.2)
ALP BLD-CCNC: 80 U/L (ref 40–129)
ALT SERPL-CCNC: 10 U/L (ref 0–40)
ANION GAP SERPL CALCULATED.3IONS-SCNC: 9 MMOL/L (ref 7–16)
AST SERPL-CCNC: 14 U/L (ref 0–39)
BASOPHILS ABSOLUTE: 0.02 E9/L (ref 0–0.2)
BASOPHILS RELATIVE PERCENT: 0.5 % (ref 0–2)
BILIRUB SERPL-MCNC: 0.6 MG/DL (ref 0–1.2)
BILIRUBIN URINE: NEGATIVE
BLOOD, URINE: NEGATIVE
BUN BLDV-MCNC: 13 MG/DL (ref 6–23)
CALCIUM SERPL-MCNC: 8.5 MG/DL (ref 8.6–10.2)
CHLORIDE BLD-SCNC: 102 MMOL/L (ref 98–107)
CLARITY: CLEAR
CO2: 27 MMOL/L (ref 22–29)
COLOR: YELLOW
CREAT SERPL-MCNC: 1 MG/DL (ref 0.7–1.2)
D DIMER: 233 NG/ML DDU
EKG ATRIAL RATE: 71 BPM
EKG P AXIS: 57 DEGREES
EKG P-R INTERVAL: 112 MS
EKG Q-T INTERVAL: 406 MS
EKG QRS DURATION: 64 MS
EKG QTC CALCULATION (BAZETT): 441 MS
EKG R AXIS: -21 DEGREES
EKG T AXIS: 55 DEGREES
EKG VENTRICULAR RATE: 71 BPM
EOSINOPHILS ABSOLUTE: 0.1 E9/L (ref 0.05–0.5)
EOSINOPHILS RELATIVE PERCENT: 2.3 % (ref 0–6)
GFR SERPL CREATININE-BSD FRML MDRD: >60 ML/MIN/1.73
GLUCOSE BLD-MCNC: 101 MG/DL (ref 74–99)
GLUCOSE URINE: NEGATIVE MG/DL
HCT VFR BLD CALC: 38 % (ref 37–54)
HEMOGLOBIN: 12.7 G/DL (ref 12.5–16.5)
IMMATURE GRANULOCYTES #: 0.01 E9/L
IMMATURE GRANULOCYTES %: 0.2 % (ref 0–5)
KETONES, URINE: NEGATIVE MG/DL
LACTIC ACID: 1.1 MMOL/L (ref 0.5–2.2)
LEUKOCYTE ESTERASE, URINE: NEGATIVE
LIPASE: 28 U/L (ref 13–60)
LYMPHOCYTES ABSOLUTE: 1.43 E9/L (ref 1.5–4)
LYMPHOCYTES RELATIVE PERCENT: 33.6 % (ref 20–42)
MCH RBC QN AUTO: 26.8 PG (ref 26–35)
MCHC RBC AUTO-ENTMCNC: 33.4 % (ref 32–34.5)
MCV RBC AUTO: 80.3 FL (ref 80–99.9)
MONOCYTES ABSOLUTE: 0.47 E9/L (ref 0.1–0.95)
MONOCYTES RELATIVE PERCENT: 11 % (ref 2–12)
NEUTROPHILS ABSOLUTE: 2.23 E9/L (ref 1.8–7.3)
NEUTROPHILS RELATIVE PERCENT: 52.4 % (ref 43–80)
NITRITE, URINE: NEGATIVE
PDW BLD-RTO: 17.2 FL (ref 11.5–15)
PH UA: 7 (ref 5–9)
PLATELET # BLD: 310 E9/L (ref 130–450)
PMV BLD AUTO: 8.6 FL (ref 7–12)
POTASSIUM SERPL-SCNC: 3.6 MMOL/L (ref 3.5–5)
PROTEIN UA: NEGATIVE MG/DL
RBC # BLD: 4.73 E12/L (ref 3.8–5.8)
SODIUM BLD-SCNC: 138 MMOL/L (ref 132–146)
SPECIFIC GRAVITY UA: 1.01 (ref 1–1.03)
TOTAL PROTEIN: 6.9 G/DL (ref 6.4–8.3)
TROPONIN, HIGH SENSITIVITY: 20 NG/L (ref 0–11)
TROPONIN, HIGH SENSITIVITY: 25 NG/L (ref 0–11)
UROBILINOGEN, URINE: 1 E.U./DL
WBC # BLD: 4.3 E9/L (ref 4.5–11.5)

## 2023-03-07 PROCEDURE — 81003 URINALYSIS AUTO W/O SCOPE: CPT

## 2023-03-07 PROCEDURE — 93010 ELECTROCARDIOGRAM REPORT: CPT | Performed by: INTERNAL MEDICINE

## 2023-03-07 PROCEDURE — 6360000004 HC RX CONTRAST MEDICATION: Performed by: RADIOLOGY

## 2023-03-07 PROCEDURE — 6360000002 HC RX W HCPCS: Performed by: EMERGENCY MEDICINE

## 2023-03-07 PROCEDURE — 84484 ASSAY OF TROPONIN QUANT: CPT

## 2023-03-07 PROCEDURE — 71275 CT ANGIOGRAPHY CHEST: CPT

## 2023-03-07 PROCEDURE — 93005 ELECTROCARDIOGRAM TRACING: CPT | Performed by: NURSE PRACTITIONER

## 2023-03-07 PROCEDURE — 74177 CT ABD & PELVIS W/CONTRAST: CPT

## 2023-03-07 RX ORDER — POLYETHYLENE GLYCOL 3350 17 G/17G
17 POWDER, FOR SOLUTION ORAL DAILY
Qty: 850 G | Refills: 0 | Status: ON HOLD | OUTPATIENT
Start: 2023-03-07 | End: 2023-04-06

## 2023-03-07 RX ORDER — SODIUM CHLORIDE 0.9 % (FLUSH) 0.9 %
10 SYRINGE (ML) INJECTION
Status: DISCONTINUED | OUTPATIENT
Start: 2023-03-07 | End: 2023-03-07 | Stop reason: HOSPADM

## 2023-03-07 RX ADMIN — MORPHINE SULFATE 2 MG: 2 INJECTION, SOLUTION INTRAMUSCULAR; INTRAVENOUS at 07:00

## 2023-03-07 RX ADMIN — ONDANSETRON 4 MG: 2 INJECTION INTRAMUSCULAR; INTRAVENOUS at 07:00

## 2023-03-07 RX ADMIN — IOPAMIDOL 75 ML: 755 INJECTION, SOLUTION INTRAVENOUS at 07:35

## 2023-03-07 ASSESSMENT — PAIN DESCRIPTION - LOCATION: LOCATION: RIB CAGE

## 2023-03-07 ASSESSMENT — PAIN DESCRIPTION - DESCRIPTORS: DESCRIPTORS: BURNING;SHARP

## 2023-03-07 ASSESSMENT — PAIN DESCRIPTION - ORIENTATION: ORIENTATION: LEFT

## 2023-03-07 ASSESSMENT — PAIN SCALES - GENERAL: PAINLEVEL_OUTOF10: 10

## 2023-03-07 NOTE — ED PROVIDER NOTES
HPI:  3/6/23, Time: 11:22 PM KATHLEEN Link is a 75 y.o. male presenting to the ED for left-sided rib pain abdominal pain, beginning hours ago. The complaint has been persistent, moderate in severity, and worsened by nothing. Patient reports the pain is worsened with turning and twisting. Patient reporting mainly left lower rib pain as well as abdominal pain that started hours prior arrival.  Patient reporting no vomiting or diarrhea he reports no urinary symptoms reports no black or tarry stools. Patient reporting no productive cough but does report cough. Patient reporting no calf pain or swelling. Patient reporting history of weakness on left side from prior history of cervical spine fusion. Patient reporting no fall or injury. ROS:   Pertinent positives and negatives are stated within HPI, all other systems reviewed and are negative.  --------------------------------------------- PAST HISTORY ---------------------------------------------  Past Medical History:  has a past medical history of Cancer ([facility] [address] Past Surgical History:  has a past surgical history that includes Skin graft; Hemorrhoid surgery; cervical fusion (N/A, 7/18/2022); cervical fusion (N/A, 7/18/2022); Throat surgery (N/A, 7/18/2022); and Stomach surgery (N/A, 7/22/2022). Social History:  reports that he quit smoking about 4 years ago. His smoking use included cigarettes. He has a 15.00 pack-year smoking history. He has never used smokeless tobacco. He reports that he does not drink alcohol and does not use drugs. Family History: family history is not on file. The patients home medications have been reviewed. Allergies: Patient has no known allergies.     ---------------------------------------------------PHYSICAL EXAM--------------------------------------    Constitutional/General: Alert and oriented x3, mild distress  Head: Normocephalic and atraumatic  Eyes: PERRL, EOMI  Mouth: Oropharynx clear, handling secretions, no trismus  Neck: Supple, full ROM, non tender to palpation in the midline, no stridor, no crepitus, no meningeal signs  Pulmonary: Lungs clear to auscultation bilaterally, no wheezes, rales, or rhonchi. Not in respiratory distress  Cardiovascular:  Regular rate. Regular rhythm. No murmurs, gallops, or rubs. 2+ distal pulses  Chest: no chest wall tenderness  Abdomen: Soft. Tender left upper abdomen non distended. +BS. No rebound, guarding, or rigidity. No pulsatile masses appreciated. Musculoskeletal: Moves all extremities x 4. Warm and well perfused, no clubbing, cyanosis, or edema. Capillary refill <3 seconds  Skin: warm and dry. No rashes. Neurologic: GCS 15, CN 2-12 grossly intact, patient able to move all extremities slightly weaker on left side patient reports this is chronic from prior surgery. Patient able to move all extremities though. Psych: Normal Affect    -------------------------------------------------- RESULTS -------------------------------------------------  I have personally reviewed all laboratory and imaging results for this patient. Results are listed below.      LABS:  Results for orders placed or performed during the hospital encounter of 03/07/23   CBC with Auto Differential   Result Value Ref Range    WBC 4.3 (L) 4.5 - 11.5 E9/L    RBC 4.73 3.80 - 5.80 E12/L    Hemoglobin 12.7 12.5 - 16.5 g/dL    Hematocrit 38.0 37.0 - 54.0 %    MCV 80.3 80.0 - 99.9 fL    MCH 26.8 26.0 - 35.0 pg    MCHC 33.4 32.0 - 34.5 %    RDW 17.2 (H) 11.5 - 15.0 fL    Platelets 101 071 - 381 E9/L    MPV 8.6 7.0 - 12.0 fL    Neutrophils % 52.4 43.0 - 80.0 %    Immature Granulocytes % 0.2 0.0 - 5.0 %    Lymphocytes % 33.6 20.0 - 42.0 %    Monocytes % 11.0 2.0 - 12.0 %    Eosinophils % 2.3 0.0 - 6.0 %    Basophils % 0.5 0.0 - 2.0 %    Neutrophils Absolute 2.23 1.80 - 7.30 E9/L    Immature Granulocytes # 0.01 E9/L    Lymphocytes Absolute 1.43 (L) 1.50 - 4.00 E9/L    Monocytes Absolute 0.47 0.10 - 0.95 E9/L    Eosinophils Absolute 0.10 0.05 - 0.50 E9/L    Basophils Absolute 0.02 0.00 - 0.20 E9/L   CMP   Result Value Ref Range    Sodium 138 132 - 146 mmol/L    Potassium 3.6 3.5 - 5.0 mmol/L    Chloride 102 98 - 107 mmol/L    CO2 27 22 - 29 mmol/L    Anion Gap 9 7 - 16 mmol/L    Glucose 101 (H) 74 - 99 mg/dL    BUN 13 6 - 23 mg/dL    Creatinine 1.0 0.7 - 1.2 mg/dL    Est, Glom Filt Rate >60 >=60 mL/min/1.73    Calcium 8.5 (L) 8.6 - 10.2 mg/dL    Total Protein 6.9 6.4 - 8.3 g/dL    Albumin 3.8 3.5 - 5.2 g/dL    Total Bilirubin 0.6 0.0 - 1.2 mg/dL    Alkaline Phosphatase 80 40 - 129 U/L    ALT 10 0 - 40 U/L    AST 14 0 - 39 U/L   Lipase   Result Value Ref Range    Lipase 28 13 - 60 U/L   Lactic Acid   Result Value Ref Range    Lactic Acid 1.1 0.5 - 2.2 mmol/L   Urinalysis   Result Value Ref Range    Color, UA Yellow Straw/Yellow    Clarity, UA Clear Clear    Glucose, Ur Negative Negative mg/dL    Bilirubin Urine Negative Negative    Ketones, Urine Negative Negative mg/dL    Specific Gravity, UA 1.015 1.005 - 1.030    Blood, Urine Negative Negative    pH, UA 7.0 5.0 - 9.0    Protein, UA Negative Negative mg/dL    Urobilinogen, Urine 1.0 <2.0 E.U./dL    Nitrite, Urine Negative Negative    Leukocyte Esterase, Urine Negative Negative   Troponin   Result Value Ref Range    Troponin, High Sensitivity 25 (H) 0 - 11 ng/L   D-Dimer, Quantitative   Result Value Ref Range    D-Dimer, Quant 233 ng/mL DDU   Troponin   Result Value Ref Range    Troponin, High Sensitivity 20 (H) 0 - 11 ng/L   EKG 12 Lead   Result Value Ref Range    Ventricular Rate 71 BPM    Atrial Rate 71 BPM    P-R Interval 112 ms    QRS Duration 64 ms    Q-T Interval 406 ms    QTc Calculation (Bazett) 441 ms    P Axis 57 degrees    R Axis -21 degrees    T Axis 55 degrees       RADIOLOGY:  Interpreted by Radiologist.  CTA CHEST W CONTRAST   Final Result   No evidence of pulmonary embolism or acute pulmonary abnormality.       Aneurysmal supravalvular ascending aorta measuring up to 4.2 cm without   evidence for acute abnormality thoracic aorta on limited evaluation no   obvious dissection. Abdomen and pelvis reveals fluid-filled small bowel largely distended   borderline dilated with components of minimal air-fluid levels however no   distinct transition point with large colonic stool burden concerning for an   ileus pattern or obstipation pattern given distal colonic stool burden   moderate to large involvement which may be developing a small bowel   obstructive process or from stasis without perforation or abscess         CT ABDOMEN PELVIS W IV CONTRAST Additional Contrast? None   Final Result   No evidence of pulmonary embolism or acute pulmonary abnormality. Aneurysmal supravalvular ascending aorta measuring up to 4.2 cm without   evidence for acute abnormality thoracic aorta on limited evaluation no   obvious dissection. Abdomen and pelvis reveals fluid-filled small bowel largely distended   borderline dilated with components of minimal air-fluid levels however no   distinct transition point with large colonic stool burden concerning for an   ileus pattern or obstipation pattern given distal colonic stool burden   moderate to large involvement which may be developing a small bowel   obstructive process or from stasis without perforation or abscess         XR CHEST (2 VW)   Final Result   No acute process. EKG:  This EKG is signed and interpreted by me. Rate: 71  Rhythm: Sinus  Interpretation: non-specific EKG noted what appears to be PVCs  Comparison: stable as compared to patient's most recent EKG 7/13/22    ------------------------- NURSING NOTES AND VITALS REVIEWED ---------------------------   The nursing notes within the ED encounter and vital signs as below have been reviewed by myself.   BP (!) 145/98   Pulse 84   Temp 98 °F (36.7 °C)   Resp 16   Wt 130 lb (59 kg)   SpO2 99%   BMI 19.77 kg/m²   Oxygen Saturation Interpretation: Normal    The patients available past medical records and past encounters were reviewed. ------------------------------ ED COURSE/MEDICAL DECISION MAKING----------------------  Medications   morphine (PF) injection 2 mg (2 mg IntraVENous Given 3/7/23 0700)   iopamidol (ISOVUE-370) 76 % injection 75 mL (75 mLs IntraVENous Given 3/7/23 0735)             Medical Decision Making:      History From: Patient with history of left-sided rib pain as well as abdominal pain started hours prior arrival.  Patient reporting the pain is worse with turning and twisting. Patient reporting no vomiting he reports no diarrhea reports no black or tarry stools or urinary symptoms he reports no fever chills he does report some cough he reports no calf pain or swelling. CC/HPI Summary, DDx, ED Course, Reassessment, Tests Considered, Patient expectation:   Patient with history of hypertension history of cancer history of cervical spine fusion presenting here because of left-sided abdominal pain rib pain patient reporting it started hours prior arrival.  Patient reporting no vomiting or diarrhea reports no fever no chills he does report some cough. Patient reporting no fall or injury. Patient reporting no hemoptysis. Patient reporting no calf pain or swelling. Patient reports chronic weakness on left side from prior history of neck surgery. Patient here is awake alert mild distress heart exam normal lungs are clear abdomen he is tender mainly left upper abdomen he has some left-sided rib tenderness. He is able to move all extremities. Patient differential includes perforated viscus as well as splenic infarction as well as bowel obstruction as well as PE as well as pneumonia as well as pyelonephritis    Patient had IV established and ordered morphine for pain. Patient CBC within normal limits.   White count was 4.3 hemoglobin 12.7 electrolytes potassium 3.6 troponin was 25 D-dimer is 233 urinalysis showed no signs of infection lactic was one-point 1 repeat troponin was ordered. Patient was ordered morphine 2 mg IV Zofran 4 mg IV. Patient did undergo CT of his chest as well as abdomen due to his pain. Patient CTs are still pending. Patient vital signs are stable. Patient disposition will be dependent on CT findings as well as repeat troponin. Social Determinants affecting Dx or Tx: Patient does smoke    Chronic Conditions: History of cancer history of hypertension history of cervical fusion    Records Reviewed: Patient is seen by neurosurgery. There has had no recent evaluations within the past year here in the emergency department. Patient was seen in November 2021 for abdominal        Re-Evaluations:             Re-evaluation. Patients symptoms show no change  Patient reevaluated and having some discomfort but improved. Consultations:                 Critical Care: This patient's ED course included: a personal history and physicial eaxmination    This patient has been closely monitored during their ED course. Counseling: The emergency provider has spoken with the patient and discussed todays results, in addition to providing specific details for the plan of care and counseling regarding the diagnosis and prognosis. Questions are answered at this time and they are agreeable with the plan.       --------------------------------- IMPRESSION AND DISPOSITION ---------------------------------    IMPRESSION  1. Rib pain on left side    2. Thoracic aortic aneurysm without rupture, unspecified part    3. Constipation, unspecified constipation type        DISPOSITION  Disposition: Disposition will be dependent on CT findings  Patient condition is stable        NOTE: This report was transcribed using voice recognition software.  Every effort was made to ensure accuracy; however, inadvertent computerized transcription errors may be present          Raven Wilson MD  03/06/23 [id number]       Suzie Fraser Radny Ferrer MD  03/07/23 6212       Roge Silva MD  03/07/23 5775       Roge Silva MD  03/08/23 1716

## 2023-03-07 NOTE — ED NOTES
Department of Emergency Medicine  FIRST PROVIDER TRIAGE NOTE             Independent MLP           3/6/23  11:02 PM EST    Date of Encounter: 3/6/23   MRN: [id number]      HPI: Jenn Avendaño is a 75 y.o. male who presents to the ED for No chief complaint on file. LUQ/rb pain that started tonight with shortness of breath    ROS: Negative for back pain or fever. PE: Gen Appearance/Constitutional: alert  GI: tender to palpation     Initial Plan of Care: All treatment areas with department are currently occupied. Plan to order/Initiate the following while awaiting opening in ED: labs.   Initiate Treatment-Testing, Proceed toTreatment Area When Bed Available for ED Attending/MLP to Continue Care    Electronically signed by REBECCA Navarro CNP   DD: 3/6/23       REBECCA Navarro CNP  03/06/23 3003

## 2023-03-07 NOTE — ED NOTES
Seen by night physician with signout plan to discharge home after CAT scan is resulted and troponin resulted. Repeat troponin with nonsignificant delta. CT scan of the chest does demonstrate there is mild dilatation of thoracic aorta without signs of dissection. This was discussed with the patient as well as the importance of timely follow-up. CT the abdomen pelvis does show fluid-filled loops of bowel without signs of obstruction. He appears to be constipated which is likely the cause. He says he started on bowel medication but I will also give him prescription for additional bowel medication. The patient is eager to go home and would like to be discharged at this time. He understands return if symptoms worsen. He understands he needs to follow-up with cardiothoracic surgery as well.      Bryson Shoemaker MD  03/07/23 9485

## 2023-03-10 ENCOUNTER — HOSPITAL ENCOUNTER (INPATIENT)
Age: 68
LOS: 12 days | Discharge: NURSING, REHAB OR LONG-TERM CARE | DRG: 356 | End: 2023-03-22
Attending: EMERGENCY MEDICINE | Admitting: FAMILY MEDICINE
Payer: OTHER GOVERNMENT

## 2023-03-10 ENCOUNTER — APPOINTMENT (OUTPATIENT)
Dept: GENERAL RADIOLOGY | Age: 68
DRG: 356 | End: 2023-03-10
Payer: OTHER GOVERNMENT

## 2023-03-10 ENCOUNTER — APPOINTMENT (OUTPATIENT)
Dept: CT IMAGING | Age: 68
DRG: 356 | End: 2023-03-10
Payer: OTHER GOVERNMENT

## 2023-03-10 DIAGNOSIS — K56.609 SMALL BOWEL OBSTRUCTION (HCC): Primary | ICD-10-CM

## 2023-03-10 DIAGNOSIS — N17.9 AKI (ACUTE KIDNEY INJURY) (HCC): ICD-10-CM

## 2023-03-10 LAB
ALBUMIN SERPL-MCNC: 4 G/DL (ref 3.5–5.2)
ALP BLD-CCNC: 70 U/L (ref 40–129)
ALT SERPL-CCNC: 8 U/L (ref 0–40)
ANION GAP SERPL CALCULATED.3IONS-SCNC: 14 MMOL/L (ref 7–16)
ANISOCYTOSIS: ABNORMAL
AST SERPL-CCNC: 13 U/L (ref 0–39)
BASOPHILS ABSOLUTE: 0.03 E9/L (ref 0–0.2)
BASOPHILS RELATIVE PERCENT: 0.9 % (ref 0–2)
BILIRUB SERPL-MCNC: 1.7 MG/DL (ref 0–1.2)
BUN BLDV-MCNC: 60 MG/DL (ref 6–23)
BURR CELLS: ABNORMAL
CALCIUM SERPL-MCNC: 9.3 MG/DL (ref 8.6–10.2)
CHLORIDE BLD-SCNC: 96 MMOL/L (ref 98–107)
CO2: 30 MMOL/L (ref 22–29)
CREAT SERPL-MCNC: 1.5 MG/DL (ref 0.7–1.2)
EOSINOPHILS ABSOLUTE: 0.03 E9/L (ref 0.05–0.5)
EOSINOPHILS RELATIVE PERCENT: 0.9 % (ref 0–6)
GFR SERPL CREATININE-BSD FRML MDRD: 50 ML/MIN/1.73
GLUCOSE BLD-MCNC: 123 MG/DL (ref 74–99)
HCT VFR BLD CALC: 47.3 % (ref 37–54)
HEMOGLOBIN: 15.3 G/DL (ref 12.5–16.5)
LACTIC ACID: 1.7 MMOL/L (ref 0.5–2.2)
LIPASE: 14 U/L (ref 13–60)
LYMPHOCYTES ABSOLUTE: 0.41 E9/L (ref 1.5–4)
LYMPHOCYTES RELATIVE PERCENT: 11.3 % (ref 20–42)
MCH RBC QN AUTO: 26.9 PG (ref 26–35)
MCHC RBC AUTO-ENTMCNC: 32.3 % (ref 32–34.5)
MCV RBC AUTO: 83.1 FL (ref 80–99.9)
MONOCYTES ABSOLUTE: 0.63 E9/L (ref 0.1–0.95)
MONOCYTES RELATIVE PERCENT: 17.4 % (ref 2–12)
NEUTROPHILS ABSOLUTE: 2.59 E9/L (ref 1.8–7.3)
NEUTROPHILS RELATIVE PERCENT: 69.5 % (ref 43–80)
OVALOCYTES: ABNORMAL
PDW BLD-RTO: 17.7 FL (ref 11.5–15)
PLATELET # BLD: 328 E9/L (ref 130–450)
PMV BLD AUTO: 9 FL (ref 7–12)
POIKILOCYTES: ABNORMAL
POTASSIUM SERPL-SCNC: 3.4 MMOL/L (ref 3.5–5)
RBC # BLD: 5.69 E12/L (ref 3.8–5.8)
REASON FOR REJECTION: NORMAL
REJECTED TEST: NORMAL
SODIUM BLD-SCNC: 140 MMOL/L (ref 132–146)
TARGET CELLS: ABNORMAL
TOTAL PROTEIN: 7.5 G/DL (ref 6.4–8.3)
TROPONIN, HIGH SENSITIVITY: 31 NG/L (ref 0–11)
TROPONIN, HIGH SENSITIVITY: 34 NG/L (ref 0–11)
WBC # BLD: 3.7 E9/L (ref 4.5–11.5)

## 2023-03-10 PROCEDURE — 96376 TX/PRO/DX INJ SAME DRUG ADON: CPT

## 2023-03-10 PROCEDURE — 1200000000 HC SEMI PRIVATE

## 2023-03-10 PROCEDURE — 74176 CT ABD & PELVIS W/O CONTRAST: CPT

## 2023-03-10 PROCEDURE — 96374 THER/PROPH/DIAG INJ IV PUSH: CPT

## 2023-03-10 PROCEDURE — 2580000003 HC RX 258

## 2023-03-10 PROCEDURE — 2580000003 HC RX 258: Performed by: STUDENT IN AN ORGANIZED HEALTH CARE EDUCATION/TRAINING PROGRAM

## 2023-03-10 PROCEDURE — 36415 COLL VENOUS BLD VENIPUNCTURE: CPT

## 2023-03-10 PROCEDURE — 80053 COMPREHEN METABOLIC PANEL: CPT

## 2023-03-10 PROCEDURE — 85025 COMPLETE CBC W/AUTO DIFF WBC: CPT

## 2023-03-10 PROCEDURE — 83690 ASSAY OF LIPASE: CPT

## 2023-03-10 PROCEDURE — 96375 TX/PRO/DX INJ NEW DRUG ADDON: CPT

## 2023-03-10 PROCEDURE — 93005 ELECTROCARDIOGRAM TRACING: CPT | Performed by: STUDENT IN AN ORGANIZED HEALTH CARE EDUCATION/TRAINING PROGRAM

## 2023-03-10 PROCEDURE — 6370000000 HC RX 637 (ALT 250 FOR IP)

## 2023-03-10 PROCEDURE — 83605 ASSAY OF LACTIC ACID: CPT

## 2023-03-10 PROCEDURE — 84484 ASSAY OF TROPONIN QUANT: CPT

## 2023-03-10 PROCEDURE — 74018 RADEX ABDOMEN 1 VIEW: CPT

## 2023-03-10 PROCEDURE — 6360000002 HC RX W HCPCS: Performed by: FAMILY MEDICINE

## 2023-03-10 PROCEDURE — 99285 EMERGENCY DEPT VISIT HI MDM: CPT

## 2023-03-10 PROCEDURE — 6360000002 HC RX W HCPCS: Performed by: STUDENT IN AN ORGANIZED HEALTH CARE EDUCATION/TRAINING PROGRAM

## 2023-03-10 RX ORDER — ACETAMINOPHEN 325 MG/1
650 TABLET ORAL EVERY 6 HOURS PRN
Status: DISCONTINUED | OUTPATIENT
Start: 2023-03-10 | End: 2023-03-13

## 2023-03-10 RX ORDER — SODIUM CHLORIDE 0.9 % (FLUSH) 0.9 %
10 SYRINGE (ML) INJECTION PRN
Status: DISCONTINUED | OUTPATIENT
Start: 2023-03-10 | End: 2023-03-23 | Stop reason: HOSPADM

## 2023-03-10 RX ORDER — POTASSIUM CHLORIDE 7.45 MG/ML
10 INJECTION INTRAVENOUS
Status: COMPLETED | OUTPATIENT
Start: 2023-03-10 | End: 2023-03-10

## 2023-03-10 RX ORDER — ACETAMINOPHEN 650 MG/1
650 SUPPOSITORY RECTAL EVERY 6 HOURS PRN
Status: DISCONTINUED | OUTPATIENT
Start: 2023-03-10 | End: 2023-03-13

## 2023-03-10 RX ORDER — FENTANYL CITRATE 50 UG/ML
50 INJECTION, SOLUTION INTRAMUSCULAR; INTRAVENOUS
Status: DISCONTINUED | OUTPATIENT
Start: 2023-03-10 | End: 2023-03-11

## 2023-03-10 RX ORDER — SODIUM CHLORIDE 9 MG/ML
INJECTION, SOLUTION INTRAVENOUS PRN
Status: DISCONTINUED | OUTPATIENT
Start: 2023-03-10 | End: 2023-03-23 | Stop reason: HOSPADM

## 2023-03-10 RX ORDER — LIDOCAINE HYDROCHLORIDE 20 MG/ML
JELLY TOPICAL ONCE
Status: COMPLETED | OUTPATIENT
Start: 2023-03-10 | End: 2023-03-10

## 2023-03-10 RX ORDER — POLYETHYLENE GLYCOL 3350 17 G/17G
17 POWDER, FOR SOLUTION ORAL DAILY PRN
Status: DISCONTINUED | OUTPATIENT
Start: 2023-03-10 | End: 2023-03-16

## 2023-03-10 RX ORDER — ONDANSETRON 2 MG/ML
4 INJECTION INTRAMUSCULAR; INTRAVENOUS EVERY 6 HOURS PRN
Status: DISCONTINUED | OUTPATIENT
Start: 2023-03-10 | End: 2023-03-23 | Stop reason: HOSPADM

## 2023-03-10 RX ORDER — ONDANSETRON 2 MG/ML
4 INJECTION INTRAMUSCULAR; INTRAVENOUS ONCE
Status: COMPLETED | OUTPATIENT
Start: 2023-03-10 | End: 2023-03-10

## 2023-03-10 RX ORDER — SODIUM CHLORIDE 0.9 % (FLUSH) 0.9 %
10 SYRINGE (ML) INJECTION EVERY 12 HOURS SCHEDULED
Status: DISCONTINUED | OUTPATIENT
Start: 2023-03-10 | End: 2023-03-23 | Stop reason: HOSPADM

## 2023-03-10 RX ORDER — PROMETHAZINE HYDROCHLORIDE 12.5 MG/1
12.5 TABLET ORAL EVERY 6 HOURS PRN
Status: DISCONTINUED | OUTPATIENT
Start: 2023-03-10 | End: 2023-03-23 | Stop reason: HOSPADM

## 2023-03-10 RX ORDER — FENTANYL CITRATE 50 UG/ML
50 INJECTION, SOLUTION INTRAMUSCULAR; INTRAVENOUS ONCE
Status: COMPLETED | OUTPATIENT
Start: 2023-03-10 | End: 2023-03-10

## 2023-03-10 RX ORDER — SODIUM CHLORIDE 9 MG/ML
INJECTION, SOLUTION INTRAVENOUS CONTINUOUS
Status: DISCONTINUED | OUTPATIENT
Start: 2023-03-10 | End: 2023-03-13

## 2023-03-10 RX ORDER — 0.9 % SODIUM CHLORIDE 0.9 %
1000 INTRAVENOUS SOLUTION INTRAVENOUS ONCE
Status: COMPLETED | OUTPATIENT
Start: 2023-03-10 | End: 2023-03-10

## 2023-03-10 RX ORDER — ENOXAPARIN SODIUM 100 MG/ML
40 INJECTION SUBCUTANEOUS DAILY
Status: DISCONTINUED | OUTPATIENT
Start: 2023-03-11 | End: 2023-03-23 | Stop reason: HOSPADM

## 2023-03-10 RX ORDER — OXYMETAZOLINE HYDROCHLORIDE 0.05 G/100ML
2 SPRAY NASAL ONCE
Status: COMPLETED | OUTPATIENT
Start: 2023-03-10 | End: 2023-03-10

## 2023-03-10 RX ADMIN — ONDANSETRON 4 MG: 2 INJECTION INTRAMUSCULAR; INTRAVENOUS at 18:45

## 2023-03-10 RX ADMIN — POTASSIUM CHLORIDE 10 MEQ: 7.46 INJECTION, SOLUTION INTRAVENOUS at 18:47

## 2023-03-10 RX ADMIN — FENTANYL CITRATE 50 MCG: 0.05 INJECTION, SOLUTION INTRAMUSCULAR; INTRAVENOUS at 22:50

## 2023-03-10 RX ADMIN — FENTANYL CITRATE 50 MCG: 50 INJECTION, SOLUTION INTRAMUSCULAR; INTRAVENOUS at 19:02

## 2023-03-10 RX ADMIN — SODIUM CHLORIDE 1000 ML: 9 INJECTION, SOLUTION INTRAVENOUS at 18:41

## 2023-03-10 RX ADMIN — SODIUM CHLORIDE: 9 INJECTION, SOLUTION INTRAVENOUS at 21:17

## 2023-03-10 RX ADMIN — NASAL DECONGESTANT 2 SPRAY: 0.05 SPRAY NASAL at 20:26

## 2023-03-10 RX ADMIN — POTASSIUM CHLORIDE 10 MEQ: 7.46 INJECTION, SOLUTION INTRAVENOUS at 20:29

## 2023-03-10 RX ADMIN — LIDOCAINE HYDROCHLORIDE: 20 JELLY TOPICAL at 20:26

## 2023-03-10 ASSESSMENT — PAIN DESCRIPTION - LOCATION: LOCATION: ABDOMEN

## 2023-03-10 ASSESSMENT — PAIN SCALES - GENERAL: PAINLEVEL_OUTOF10: 10

## 2023-03-10 ASSESSMENT — PAIN - FUNCTIONAL ASSESSMENT: PAIN_FUNCTIONAL_ASSESSMENT: NONE - DENIES PAIN

## 2023-03-10 ASSESSMENT — PAIN DESCRIPTION - ORIENTATION: ORIENTATION: LOWER

## 2023-03-10 NOTE — ED PROVIDER NOTES
osseous structures are without acute process. No acute process. CT ABDOMEN PELVIS W IV CONTRAST Additional Contrast? None    Result Date: 3/7/2023  EXAMINATION: CTA OF THE CHEST; CT OF THE ABDOMEN AND PELVIS WITH CONTRAST 3/7/2023 7:31 am TECHNIQUE: CTA of the chest was performed after the administration of intravenous contrast.  Multiplanar reformatted images are provided for review. 3D and MIP images are provided for review. Automated exposure control, iterative reconstruction, and/or weight based adjustment of the mA/kV was utilized to reduce the radiation dose to as low as reasonably achievable.; CT of the abdomen and pelvis was performed with the administration of intravenous contrast. Multiplanar reformatted images are provided for review. Automated exposure control, iterative reconstruction, and/or weight based adjustment of the mA/kV was utilized to reduce the radiation dose to as low as reasonably achievable. COMPARISON: None. HISTORY: ORDERING SYSTEM PROVIDED HISTORY: rib pain eevated ddimer TECHNOLOGIST PROVIDED HISTORY: Reason for exam:->rib pain eevated ddimer Decision Support Exception - unselect if not a suspected or confirmed emergency medical condition->Emergency Medical Condition (MA) What reading provider will be dictating this exam?->CRC; ORDERING SYSTEM PROVIDED HISTORY: abdominal pain TECHNOLOGIST PROVIDED HISTORY: Reason for exam:->abdominal pain Additional Contrast?->None Decision Support Exception - unselect if not a suspected or confirmed emergency medical condition->Emergency Medical Condition (MA) What reading provider will be dictating this exam?->CRC FINDINGS: CTA chest: Pulmonary Arteries: Pulmonary arteries are adequately opacified for evaluation. No evidence of intraluminal filling defect to suggest pulmonary embolism. Main pulmonary artery is normal in caliber. Mediastinum: No evidence of mediastinal lymphadenopathy.   The heart and pericardium demonstrate no acute LFTs within normal limits. Lipase unremarkable at 7. Lactic acid normal at 1.7. Cardiac evaluation essentially unremarkable. Initial troponin of 31 with a repeat of 34, delta 3. Patient denying any active chest pain in the emergency department. EKG is sinus and nonspecific with no acute ischemic changes. CT abdomen pelvis obtained and notable for small bowel obstruction but no other concerning acute intra-abdominal processes. Imaging as interpreted by me detailed below with official radiology read above. Patient was also given IV Zofran 4 mg and IV fentanyl 50 mcg for symptom control in the ED. Patient did note some improvement in presenting symptoms. General surgery was consulted to help in the management of SBO. NG tube was placed by general surgery team and patient apparently had large output. Plan is for admission for further work-up and management. Patient was excepted for admission by Dr. Selvin Barger. He was agreeable with plan after shared decision making. Patient remained hemodynamically stable in the ED. Non-plain film images such as CT, Ultrasound and MRI are read by the radiologist. Jacqueline Arch radiographic images are visualized and preliminarily interpreted by the ED Provider with the below findings:    CT abdomen pelvis with distention of small bowel suggestive of SBO but no evidence of free air, significant infectious/inflammatory changes or large obstructing renal stones. Discussion with Other Profesionals : Admitting Team who accepted patient for admission and Consultant General surgery to help in the management of SBO    Social Determinants : None    Records Reviewed : Other Stress test from 7/15/2022 reviewed. Impression: Normal wall motion and myocardial thickening of the left ventricular. Left ventricular ejection fraction is calculated at 67%. Right and left ventricle is normal size.     Chronic conditions: severe protein calorie malnutrition, unspecified cancer and cervical

## 2023-03-10 NOTE — LETTER
[id number]         Marital Status:    Employer: DISABLED         Bahai: Baptism   Primary Care Provider: Atul Lucas DO         Primary Phone: [phone]   EMERGENCY CONTACT   Contact Name Legal Guardian? Relationship to Patient Home Phone Work Phone   1. Divine Ding  2. Thien Donovan    No Other  Brother/Sister [phone]                 GUARANTOR            Guarantor: Noemí Viricoco Donovan     : 1955   Address: [address] [place] [address] Sex: Male   [place] [facility] [id number]     Relation to Patient: Self       Home Phone: [phone]   Guarantor ID: [id number]       Work Phone: [phone]   Guarantor Employer: DISABLED         Status: DISABLED      COVERAGE        PRIMARY INSURANCE   Payor: Kristal Ramirez Plan: Kristal Ramirez   Payor Address: [facility] [id number], [address] [facility]       Group Number:   Insurance Type: INDEMNITY   Subscriber Name: David Olson : 1955   Subscriber ID: [id number] Pat. Rel. to Sub: Self   SECONDARY INSURANCE   Payor:   Plan:     Payor Address:  ,           Group Number:   Insurance Type:     Subscriber Name:   Subscriber :     Subscriber ID:   Pat.  Rel. to Sub:        CSN: [id number]

## 2023-03-11 ENCOUNTER — APPOINTMENT (OUTPATIENT)
Dept: ULTRASOUND IMAGING | Age: 68
DRG: 356 | End: 2023-03-11
Payer: OTHER GOVERNMENT

## 2023-03-11 PROBLEM — K56.609 SMALL BOWEL OBSTRUCTION (HCC): Status: ACTIVE | Noted: 2023-03-11

## 2023-03-11 LAB
ABO/RH: NORMAL
ALBUMIN SERPL-MCNC: 3.4 G/DL (ref 3.5–5.2)
ALP BLD-CCNC: 60 U/L (ref 40–129)
ALT SERPL-CCNC: 7 U/L (ref 0–40)
ANION GAP SERPL CALCULATED.3IONS-SCNC: 13 MMOL/L (ref 7–16)
ANION GAP SERPL CALCULATED.3IONS-SCNC: 17 MMOL/L (ref 7–16)
ANISOCYTOSIS: ABNORMAL
ANTIBODY SCREEN: NORMAL
AST SERPL-CCNC: 11 U/L (ref 0–39)
BASOPHILS ABSOLUTE: 0 E9/L (ref 0–0.2)
BASOPHILS RELATIVE PERCENT: 0.2 % (ref 0–2)
BILIRUB SERPL-MCNC: 1.1 MG/DL (ref 0–1.2)
BUN BLDV-MCNC: 65 MG/DL (ref 6–23)
BUN BLDV-MCNC: 75 MG/DL (ref 6–23)
BURR CELLS: ABNORMAL
CALCIUM SERPL-MCNC: 8 MG/DL (ref 8.6–10.2)
CALCIUM SERPL-MCNC: 8.4 MG/DL (ref 8.6–10.2)
CHLORIDE BLD-SCNC: 100 MMOL/L (ref 98–107)
CHLORIDE BLD-SCNC: 101 MMOL/L (ref 98–107)
CO2: 26 MMOL/L (ref 22–29)
CO2: 31 MMOL/L (ref 22–29)
CREAT SERPL-MCNC: 1.7 MG/DL (ref 0.7–1.2)
CREAT SERPL-MCNC: 1.8 MG/DL (ref 0.7–1.2)
EOSINOPHILS ABSOLUTE: 0 E9/L (ref 0.05–0.5)
EOSINOPHILS RELATIVE PERCENT: 0.4 % (ref 0–6)
GFR SERPL CREATININE-BSD FRML MDRD: 40 ML/MIN/1.73
GFR SERPL CREATININE-BSD FRML MDRD: 43 ML/MIN/1.73
GLUCOSE BLD-MCNC: 101 MG/DL (ref 74–99)
GLUCOSE BLD-MCNC: 107 MG/DL (ref 74–99)
HCT VFR BLD CALC: 43.9 % (ref 37–54)
HEMOGLOBIN: 13.8 G/DL (ref 12.5–16.5)
LYMPHOCYTES ABSOLUTE: 0.27 E9/L (ref 1.5–4)
LYMPHOCYTES RELATIVE PERCENT: 6.1 % (ref 20–42)
MCH RBC QN AUTO: 27.2 PG (ref 26–35)
MCHC RBC AUTO-ENTMCNC: 31.4 % (ref 32–34.5)
MCV RBC AUTO: 86.4 FL (ref 80–99.9)
MONOCYTES ABSOLUTE: 0.86 E9/L (ref 0.1–0.95)
MONOCYTES RELATIVE PERCENT: 19.1 % (ref 2–12)
NEUTROPHILS ABSOLUTE: 3.38 E9/L (ref 1.8–7.3)
NEUTROPHILS RELATIVE PERCENT: 74.8 % (ref 43–80)
OVALOCYTES: ABNORMAL
PDW BLD-RTO: 17.2 FL (ref 11.5–15)
PLATELET # BLD: 313 E9/L (ref 130–450)
PMV BLD AUTO: 9.6 FL (ref 7–12)
POIKILOCYTES: ABNORMAL
POTASSIUM REFLEX MAGNESIUM: 3.7 MMOL/L (ref 3.5–5)
POTASSIUM SERPL-SCNC: 3.8 MMOL/L (ref 3.5–5)
RBC # BLD: 5.08 E12/L (ref 3.8–5.8)
SODIUM BLD-SCNC: 143 MMOL/L (ref 132–146)
SODIUM BLD-SCNC: 145 MMOL/L (ref 132–146)
TOTAL PROTEIN: 6.1 G/DL (ref 6.4–8.3)
WBC # BLD: 4.5 E9/L (ref 4.5–11.5)

## 2023-03-11 PROCEDURE — 76770 US EXAM ABDO BACK WALL COMP: CPT

## 2023-03-11 PROCEDURE — 2580000003 HC RX 258: Performed by: SURGERY

## 2023-03-11 PROCEDURE — 86901 BLOOD TYPING SEROLOGIC RH(D): CPT

## 2023-03-11 PROCEDURE — 80048 BASIC METABOLIC PNL TOTAL CA: CPT

## 2023-03-11 PROCEDURE — 86900 BLOOD TYPING SEROLOGIC ABO: CPT

## 2023-03-11 PROCEDURE — 6360000002 HC RX W HCPCS

## 2023-03-11 PROCEDURE — 2580000003 HC RX 258: Performed by: FAMILY MEDICINE

## 2023-03-11 PROCEDURE — 80053 COMPREHEN METABOLIC PANEL: CPT

## 2023-03-11 PROCEDURE — 86850 RBC ANTIBODY SCREEN: CPT

## 2023-03-11 PROCEDURE — 2580000003 HC RX 258: Performed by: STUDENT IN AN ORGANIZED HEALTH CARE EDUCATION/TRAINING PROGRAM

## 2023-03-11 PROCEDURE — 1200000000 HC SEMI PRIVATE

## 2023-03-11 PROCEDURE — 85025 COMPLETE CBC W/AUTO DIFF WBC: CPT

## 2023-03-11 PROCEDURE — 51702 INSERT TEMP BLADDER CATH: CPT

## 2023-03-11 PROCEDURE — 99223 1ST HOSP IP/OBS HIGH 75: CPT | Performed by: SURGERY

## 2023-03-11 PROCEDURE — 2700000000 HC OXYGEN THERAPY PER DAY

## 2023-03-11 PROCEDURE — 51798 US URINE CAPACITY MEASURE: CPT

## 2023-03-11 PROCEDURE — 6360000002 HC RX W HCPCS: Performed by: FAMILY MEDICINE

## 2023-03-11 PROCEDURE — 2580000003 HC RX 258

## 2023-03-11 PROCEDURE — 36415 COLL VENOUS BLD VENIPUNCTURE: CPT

## 2023-03-11 RX ORDER — SODIUM CHLORIDE, SODIUM LACTATE, POTASSIUM CHLORIDE, CALCIUM CHLORIDE 600; 310; 30; 20 MG/100ML; MG/100ML; MG/100ML; MG/100ML
500 INJECTION, SOLUTION INTRAVENOUS ONCE
Status: COMPLETED | OUTPATIENT
Start: 2023-03-11 | End: 2023-03-11

## 2023-03-11 RX ADMIN — SODIUM CHLORIDE: 9 INJECTION, SOLUTION INTRAVENOUS at 21:25

## 2023-03-11 RX ADMIN — SODIUM CHLORIDE, POTASSIUM CHLORIDE, SODIUM LACTATE AND CALCIUM CHLORIDE 500 ML: 600; 310; 30; 20 INJECTION, SOLUTION INTRAVENOUS at 10:45

## 2023-03-11 RX ADMIN — SODIUM CHLORIDE: 9 INJECTION, SOLUTION INTRAVENOUS at 04:02

## 2023-03-11 RX ADMIN — HYDROMORPHONE HYDROCHLORIDE 1 MG: 1 INJECTION, SOLUTION INTRAMUSCULAR; INTRAVENOUS; SUBCUTANEOUS at 08:33

## 2023-03-11 RX ADMIN — HYDROMORPHONE HYDROCHLORIDE 1 MG: 1 INJECTION, SOLUTION INTRAMUSCULAR; INTRAVENOUS; SUBCUTANEOUS at 23:40

## 2023-03-11 RX ADMIN — HYDROMORPHONE HYDROCHLORIDE 1 MG: 1 INJECTION, SOLUTION INTRAMUSCULAR; INTRAVENOUS; SUBCUTANEOUS at 18:07

## 2023-03-11 RX ADMIN — SODIUM CHLORIDE, POTASSIUM CHLORIDE, SODIUM LACTATE AND CALCIUM CHLORIDE 500 ML: 600; 310; 30; 20 INJECTION, SOLUTION INTRAVENOUS at 12:52

## 2023-03-11 RX ADMIN — HYDROMORPHONE HYDROCHLORIDE 1 MG: 1 INJECTION, SOLUTION INTRAMUSCULAR; INTRAVENOUS; SUBCUTANEOUS at 21:01

## 2023-03-11 RX ADMIN — FENTANYL CITRATE 50 MCG: 0.05 INJECTION, SOLUTION INTRAMUSCULAR; INTRAVENOUS at 01:22

## 2023-03-11 RX ADMIN — HYDROMORPHONE HYDROCHLORIDE 1 MG: 1 INJECTION, SOLUTION INTRAMUSCULAR; INTRAVENOUS; SUBCUTANEOUS at 06:46

## 2023-03-11 RX ADMIN — HYDROMORPHONE HYDROCHLORIDE 1 MG: 1 INJECTION, SOLUTION INTRAMUSCULAR; INTRAVENOUS; SUBCUTANEOUS at 13:02

## 2023-03-11 RX ADMIN — SODIUM CHLORIDE, PRESERVATIVE FREE 10 ML: 5 INJECTION INTRAVENOUS at 23:39

## 2023-03-11 RX ADMIN — HYDROMORPHONE HYDROCHLORIDE 1 MG: 1 INJECTION, SOLUTION INTRAMUSCULAR; INTRAVENOUS; SUBCUTANEOUS at 10:42

## 2023-03-11 RX ADMIN — HYDROMORPHONE HYDROCHLORIDE 1 MG: 1 INJECTION, SOLUTION INTRAMUSCULAR; INTRAVENOUS; SUBCUTANEOUS at 03:59

## 2023-03-11 RX ADMIN — ENOXAPARIN SODIUM 40 MG: 100 INJECTION SUBCUTANEOUS at 08:33

## 2023-03-11 RX ADMIN — SODIUM CHLORIDE, PRESERVATIVE FREE 10 ML: 5 INJECTION INTRAVENOUS at 20:51

## 2023-03-11 ASSESSMENT — PAIN SCALES - GENERAL
PAINLEVEL_OUTOF10: 0
PAINLEVEL_OUTOF10: 0
PAINLEVEL_OUTOF10: 9
PAINLEVEL_OUTOF10: 4
PAINLEVEL_OUTOF10: 9
PAINLEVEL_OUTOF10: 7
PAINLEVEL_OUTOF10: 6
PAINLEVEL_OUTOF10: 9
PAINLEVEL_OUTOF10: 0

## 2023-03-11 ASSESSMENT — PAIN DESCRIPTION - ONSET
ONSET: ON-GOING

## 2023-03-11 ASSESSMENT — PAIN DESCRIPTION - ORIENTATION
ORIENTATION: RIGHT;LEFT;LOWER;UPPER
ORIENTATION: LEFT;RIGHT;LOWER
ORIENTATION: RIGHT;LEFT;LOWER

## 2023-03-11 ASSESSMENT — PAIN DESCRIPTION - LOCATION
LOCATION: ABDOMEN

## 2023-03-11 ASSESSMENT — PAIN DESCRIPTION - FREQUENCY
FREQUENCY: INTERMITTENT
FREQUENCY: INTERMITTENT
FREQUENCY: CONTINUOUS
FREQUENCY: INTERMITTENT
FREQUENCY: CONTINUOUS
FREQUENCY: CONTINUOUS

## 2023-03-11 ASSESSMENT — PAIN DESCRIPTION - PAIN TYPE
TYPE: ACUTE PAIN

## 2023-03-11 ASSESSMENT — PAIN DESCRIPTION - DESCRIPTORS
DESCRIPTORS: ACHING;CRAMPING;SHARP;SORE
DESCRIPTORS: ACHING;CRAMPING;TENDER
DESCRIPTORS: TIGHTNESS;PRESSURE;CRAMPING
DESCRIPTORS: ACHING;CRAMPING;TENDER
DESCRIPTORS: ACHING;CRAMPING;SHARP;SORE

## 2023-03-11 NOTE — PLAN OF CARE
[facility] THAT PT HAS VOIDED ONLY DROPS IN THE URINAL AT THIS TIME AND THIS HAS BEEN THE ONLY URINARY OUTPUT SINCE 0700.

## 2023-03-11 NOTE — PLAN OF CARE
NOTIFIED [facility] THAT PT WAS BLADDER SCANNED FOR 116ML AND PT DENIES HAVING THE URGE TO VOID AT THIS TIME.

## 2023-03-11 NOTE — PROGRESS NOTES
3-10    BMP with an NURIA creatinine 1.8 potassium 3.7 LFTs normal CBC unremarkable, lactate 1.7  CT abdomen and pelvis personally reviewed and interpreted patient with high-grade small bowel obstruction moderate distended stomach minimal free fluid in the pelvis. I discussed with the patient that he has a high-grade bowel obstruction and considering he has only had a laparoscopic G-tube placed there is risk that a small bowel lesion could be causing the obstruction rather than pure conservative nonoperative management I would recommend exploration. He also has additional symptoms such as weight loss, nausea, possible night sweats and fevers over the last few weeks that he is at high risk of malignancy. Last colonoscopy was well over 10 years ago he stated he had a polyp. Considering the patient is stable at this time I would like to IV hydrate him continue his NG tube and attempt to decompress his small bowel to give a chance diagnostic laparoscopy tomorrow. Plan would be diagnostic laparoscopy possible open laparotomy exploration of small bowel and colon possible bowel resection possible ostomy pending on what we see. Risks of bleeding infection injury to the nearby structures everything has been discussed with the patient all questions answered.       Suzie Snyder MD

## 2023-03-11 NOTE — PLAN OF CARE
NOTIFIED [place] THAT PT HAS URINE IN VALLE TUBING BUT NO URINE IN [address]

## 2023-03-11 NOTE — PLAN OF CARE
Problem: Discharge Planning  Goal: Discharge to home or other facility with appropriate resources  3/11/2023 1519 by Prisca Tate RN  Outcome: Progressing  3/11/2023 1208 by Meng Delvalle RN  Outcome: Progressing  Flowsheets (Taken 3/11/2023 0341 by Rai Crowell RN)  Discharge to home or other facility with appropriate resources:   Identify barriers to discharge with patient and caregiver   Identify discharge learning needs (meds, wound care, etc)   Refer to discharge planning if patient needs post-hospital services based on physician order or complex needs related to functional status, cognitive ability or social support system     Problem: Pain  Goal: Verbalizes/displays adequate comfort level or baseline comfort level  3/11/2023 1519 by Prisca Tate RN  Outcome: Progressing  3/11/2023 1208 by Meng Delvalle RN  Outcome: Progressing     Problem: Safety - Adult  Goal: Free from fall injury  3/11/2023 1519 by Prisca Tate RN  Outcome: Progressing  3/11/2023 1208 by Meng Delvalle RN  Outcome: Progressing     Problem: Skin/Tissue Integrity  Goal: Absence of new skin breakdown  3/11/2023 1519 by Prisca Tate RN  Outcome: Progressing  3/11/2023 1208 by Meng Delvalle, RN  Outcome: Progressing

## 2023-03-11 NOTE — CONSULTS
exam:->abd distention Additional Contrast?->None Decision Support Exception - unselect if not a suspected or confirmed emergency medical condition->Emergency Medical Condition (MA) What reading provider will be dictating this exam?->CRC FINDINGS: There are multiple gas and fluid-filled distended segments of small bowel throughout the abdomen with lumen diameter measuring up to 4 cm. No free air. Small free fluid located in the pelvis. Partial visualization of the appendix which appears unremarkable in the right lower quadrant. No intrahepatic or extrahepatic bile duct dilatation. Gallbladder is not optimally visualized. No findings to suggest acute pancreatitis. Spleen is nonenlarged. No hydronephrosis. No renal or ureteral calculus. No retroperitoneal lymphadenopathy. Stomach is distended. New small right pleural effusion with adjacent atelectatic changes. Severe central canal stenosis at L4/L5. 1. Limited examination due to paucity of intra-and pelvic fat. 2. Multiple distended segments of small bowel throughout the abdomen which have increased in diameter compared to prior from March 7, 2023. Findings could suggest distal small bowel obstruction. Large bowel is nondilated. 3. Moderate distension of the stomach. 4. Minimal free fluid in the pelvis. 5. New small right pleural effusion with adjacent atelectatic changes.          ASSESSMENT:  75 y.o. male with likely small bowel obstruction     PLAN:  - NG tube to LIWS   - mIVF; may need additional bolus pending NG output  - replace electrolytes  - keep NPO    Discussed with Dr. Fidelina Byrd     Electronically signed by Deborah Yancey MD on 3/10/23 at 9:05 PM EST

## 2023-03-11 NOTE — H&P
Hospitalist History & Physical      PCP: Flip Bravo DO    Date of Service: Pt seen/examined on 3/10/2023     Chief Complaint:  had concerns including Bloated (Distended abd, emesis, no bm for 1 wk). History Of Present Illness:    Mr. Dillan Castillo, a 75y.o. year old male  who  has a past medical history of Cancer ([facility] Patient presented to the emergency department with complaints of abdominal bloating/distention, nausea and vomiting. Patient reports last bowel movement was 1 week ago. Patient was seen in the emergency department several days ago and diagnosed with possible ileus. He was discharged home. Patient notes his symptoms worsen since discharge. Has had several episodes of emesis. Vital signs show the patient to be tachycardic with a rate of 111. Remainder of vital signs within normal limits and stable. The patient is afebrile. Laboratory studies demonstrate potassium 3.4, BUN 60, creatinine 1.5, glucose 123, troponin 31 with repeat of 34. CT imaging shows small bowel obstruction. General surgery was consulted. NG tube was placed. Medicine consulted for admission. Past Medical History:   Diagnosis Date    Cancer ([facility] 2013    Prostate       Past Surgical History:   Procedure Laterality Date    CERVICAL FUSION N/A 7/18/2022    ANTERIOR C3-C4, C4-C5,  AND C5-C6 CERVICAL DISCECTOMY AND FUSION performed by Jigar Nichole MD at [address] N/A 7/18/2022    POSTERIOR C3-C7 LAMINECTOMY, POSTERIOR C3-T1 FUSION performed by Jigar Nichole MD at [address] [facility] N/A 7/22/2022    LAPAROSCOPIC POSSIBLE OPEN PLACEMENT OF GASTROSTOMY TUBE WANTS AS EARLY AS POSSIBLE TO FOLLOW OTHER ADDONS performed by Urban Wiley MD at [address] [facility] N/A 7/18/2022    PHARYNGEAL TEAR REPAIR performed by Jigar Nichole MD at [address]       Prior to Admission medications    Medication Sig Start Date End Date Taking?  Authorizing fluid in the pelvis. 5. New small right pleural effusion with adjacent atelectatic changes. XR CHEST (2 VW)    Result Date: 3/6/2023  EXAMINATION: TWO XRAY VIEWS OF THE CHEST 3/6/2023 11:36 pm COMPARISON: 07/21/2022. HISTORY: ORDERING SYSTEM PROVIDED HISTORY: chest pain TECHNOLOGIST PROVIDED HISTORY: Reason for exam:->chest pain What reading provider will be dictating this exam?->CRC FINDINGS: The lungs are without acute focal process. There is no effusion or pneumothorax. The cardiomediastinal silhouette is without acute process. The osseous structures are without acute process. No acute process. CT ABDOMEN PELVIS W IV CONTRAST Additional Contrast? None    Result Date: 3/7/2023  EXAMINATION: CTA OF THE CHEST; CT OF THE ABDOMEN AND PELVIS WITH CONTRAST 3/7/2023 7:31 am TECHNIQUE: CTA of the chest was performed after the administration of intravenous contrast.  Multiplanar reformatted images are provided for review. 3D and MIP images are provided for review. Automated exposure control, iterative reconstruction, and/or weight based adjustment of the mA/kV was utilized to reduce the radiation dose to as low as reasonably achievable.; CT of the abdomen and pelvis was performed with the administration of intravenous contrast. Multiplanar reformatted images are provided for review. Automated exposure control, iterative reconstruction, and/or weight based adjustment of the mA/kV was utilized to reduce the radiation dose to as low as reasonably achievable. COMPARISON: None.  HISTORY: ORDERING SYSTEM PROVIDED HISTORY: rib pain eevated ddimer TECHNOLOGIST PROVIDED HISTORY: Reason for exam:->rib pain eevated ddimer Decision Support Exception - unselect if not a suspected or confirmed emergency medical condition->Emergency Medical Condition (MA) What reading provider will be dictating this exam?->CRC; ORDERING SYSTEM PROVIDED HISTORY: abdominal pain TECHNOLOGIST PROVIDED HISTORY: Reason for exam:->abdominal pain

## 2023-03-11 NOTE — PROGRESS NOTES
8\" (1.727 m)   Wt 126 lb 6.4 oz (57.3 kg)   SpO2 95%   BMI 19.22 kg/m²     General appearance:  awake, alert, and oriented to person, place, time, and purpose; appears stated age and cooperative; no apparent distress no labored breathing  HEENT:  Conjunctivae/corneas clear. Neck: Supple. No jugular venous distention. Respiratory: symmetrical; clear to auscultation bilaterally; no wheezes; no rhonchi; no rales  Cardiovascular: rhythm regular; rate controlled; no murmurs  Abdomen: Soft, nontender, nondistended  Extremities:  peripheral pulses present; no peripheral edema; no ulcers  Musculoskeletal: No clubbing, cyanosis, no bilateral lower extremity edema. Brisk capillary refill.    Skin:  No rashes  on visible skin  Neurologic: awake, alert and following commands     ASSESSMENT and PLAN:    Small bowel obstruction   General surgery consulted  NGT to LIS   Supportive management     Acute kidney injury   Slightly worse today, Cr at 1.8   Continue IVFs   Renal US pending   Will consult Nephrology if continuing to worsen tomorrow    Essential HTN   Holding home Norvasc and lisinopril in the setting of acute illness and soft BPs     BPH   Holding home Flomax in the setting of illness and soft pressures         DVT Prophylaxis [x] Lovenox, []  Heparin, [] SCDs, [] Ambulation   GI Prophylaxis [] PPI,  [] H2 Blocker,  [] Carafate,  [x] Diet/Tube Feeds   Disposition Patient requires continued admission due to SBO with NGT in place   MDM [] Low, [x] Moderate,[]  High       Medications:  REVIEWED DAILY    Infusion Medications    sodium chloride 100 mL/hr at 03/11/23 0649    sodium chloride       Scheduled Medications    [START ON 3/12/2023] ceFAZolin  2,000 mg IntraVENous Once    sodium chloride flush  10 mL IntraVENous 2 times per day    enoxaparin  40 mg SubCUTAneous Daily     PRN Meds: HYDROmorphone **OR** HYDROmorphone, sodium chloride flush, sodium chloride, promethazine **OR** ondansetron, polyethylene glycol,

## 2023-03-12 ENCOUNTER — ANESTHESIA EVENT (OUTPATIENT)
Dept: OPERATING ROOM | Age: 68
End: 2023-03-12
Payer: OTHER GOVERNMENT

## 2023-03-12 ENCOUNTER — ANESTHESIA (OUTPATIENT)
Dept: OPERATING ROOM | Age: 68
End: 2023-03-12
Payer: OTHER GOVERNMENT

## 2023-03-12 LAB
AMORPHOUS: ABNORMAL
ANION GAP SERPL CALCULATED.3IONS-SCNC: 13 MMOL/L (ref 7–16)
ANION GAP SERPL CALCULATED.3IONS-SCNC: 15 MMOL/L (ref 7–16)
BACTERIA: ABNORMAL /HPF
BILIRUBIN URINE: NEGATIVE
BLOOD, URINE: ABNORMAL
BUN BLDV-MCNC: 60 MG/DL (ref 6–23)
BUN BLDV-MCNC: 70 MG/DL (ref 6–23)
CALCIUM SERPL-MCNC: 7.5 MG/DL (ref 8.6–10.2)
CALCIUM SERPL-MCNC: 8.1 MG/DL (ref 8.6–10.2)
CHLORIDE BLD-SCNC: 103 MMOL/L (ref 98–107)
CHLORIDE BLD-SCNC: 110 MMOL/L (ref 98–107)
CLARITY: CLEAR
CO2: 28 MMOL/L (ref 22–29)
CO2: 28 MMOL/L (ref 22–29)
COLOR: YELLOW
CREAT SERPL-MCNC: 1.4 MG/DL (ref 0.7–1.2)
CREAT SERPL-MCNC: 1.6 MG/DL (ref 0.7–1.2)
EKG ATRIAL RATE: 108 BPM
EKG P AXIS: 37 DEGREES
EKG P-R INTERVAL: 132 MS
EKG Q-T INTERVAL: 346 MS
EKG QRS DURATION: 72 MS
EKG QTC CALCULATION (BAZETT): 463 MS
EKG R AXIS: 22 DEGREES
EKG T AXIS: 80 DEGREES
EKG VENTRICULAR RATE: 108 BPM
EPITHELIAL CELLS, UA: ABNORMAL /HPF
GFR SERPL CREATININE-BSD FRML MDRD: 46 ML/MIN/1.73
GFR SERPL CREATININE-BSD FRML MDRD: 54 ML/MIN/1.73
GLUCOSE BLD-MCNC: 111 MG/DL (ref 74–99)
GLUCOSE BLD-MCNC: 86 MG/DL (ref 74–99)
GLUCOSE URINE: NEGATIVE MG/DL
KETONES, URINE: 15 MG/DL
LEUKOCYTE ESTERASE, URINE: NEGATIVE
NITRITE, URINE: NEGATIVE
PH UA: 6 (ref 5–9)
POTASSIUM REFLEX MAGNESIUM: 4.2 MMOL/L (ref 3.5–5)
POTASSIUM SERPL-SCNC: 3.8 MMOL/L (ref 3.5–5)
PROTEIN UA: 30 MG/DL
RBC UA: ABNORMAL /HPF (ref 0–2)
SODIUM BLD-SCNC: 146 MMOL/L (ref 132–146)
SODIUM BLD-SCNC: 151 MMOL/L (ref 132–146)
SPECIFIC GRAVITY UA: 1.02 (ref 1–1.03)
UROBILINOGEN, URINE: 0.2 E.U./DL
WBC UA: ABNORMAL /HPF (ref 0–5)

## 2023-03-12 PROCEDURE — 2580000003 HC RX 258: Performed by: STUDENT IN AN ORGANIZED HEALTH CARE EDUCATION/TRAINING PROGRAM

## 2023-03-12 PROCEDURE — 3700000001 HC ADD 15 MINUTES (ANESTHESIA): Performed by: SURGERY

## 2023-03-12 PROCEDURE — 2580000003 HC RX 258

## 2023-03-12 PROCEDURE — 7100000000 HC PACU RECOVERY - FIRST 15 MIN: Performed by: SURGERY

## 2023-03-12 PROCEDURE — P9041 ALBUMIN (HUMAN),5%, 50ML: HCPCS | Performed by: NURSE ANESTHETIST, CERTIFIED REGISTERED

## 2023-03-12 PROCEDURE — 3600000004 HC SURGERY LEVEL 4 BASE: Performed by: SURGERY

## 2023-03-12 PROCEDURE — 2500000003 HC RX 250 WO HCPCS: Performed by: NURSE ANESTHETIST, CERTIFIED REGISTERED

## 2023-03-12 PROCEDURE — 0DQV0ZZ REPAIR MESENTERY, OPEN APPROACH: ICD-10-PCS | Performed by: SURGERY

## 2023-03-12 PROCEDURE — 2580000003 HC RX 258: Performed by: FAMILY MEDICINE

## 2023-03-12 PROCEDURE — 80048 BASIC METABOLIC PNL TOTAL CA: CPT

## 2023-03-12 PROCEDURE — 7100000001 HC PACU RECOVERY - ADDTL 15 MIN: Performed by: SURGERY

## 2023-03-12 PROCEDURE — 81001 URINALYSIS AUTO W/SCOPE: CPT

## 2023-03-12 PROCEDURE — 93010 ELECTROCARDIOGRAM REPORT: CPT | Performed by: INTERNAL MEDICINE

## 2023-03-12 PROCEDURE — 2709999900 HC NON-CHARGEABLE SUPPLY: Performed by: SURGERY

## 2023-03-12 PROCEDURE — 88302 TISSUE EXAM BY PATHOLOGIST: CPT

## 2023-03-12 PROCEDURE — 3600000014 HC SURGERY LEVEL 4 ADDTL 15MIN: Performed by: SURGERY

## 2023-03-12 PROCEDURE — 2060000000 HC ICU INTERMEDIATE R&B

## 2023-03-12 PROCEDURE — 6360000002 HC RX W HCPCS: Performed by: NURSE ANESTHETIST, CERTIFIED REGISTERED

## 2023-03-12 PROCEDURE — 2700000000 HC OXYGEN THERAPY PER DAY

## 2023-03-12 PROCEDURE — 6360000002 HC RX W HCPCS: Performed by: ANESTHESIOLOGY

## 2023-03-12 PROCEDURE — 6360000002 HC RX W HCPCS: Performed by: STUDENT IN AN ORGANIZED HEALTH CARE EDUCATION/TRAINING PROGRAM

## 2023-03-12 PROCEDURE — 36415 COLL VENOUS BLD VENIPUNCTURE: CPT

## 2023-03-12 PROCEDURE — 2580000003 HC RX 258: Performed by: NURSE ANESTHETIST, CERTIFIED REGISTERED

## 2023-03-12 PROCEDURE — 44050 REDUCE BOWEL OBSTRUCTION: CPT | Performed by: SURGERY

## 2023-03-12 PROCEDURE — 3700000000 HC ANESTHESIA ATTENDED CARE: Performed by: SURGERY

## 2023-03-12 PROCEDURE — 6360000002 HC RX W HCPCS

## 2023-03-12 RX ORDER — HYDRALAZINE HYDROCHLORIDE 20 MG/ML
5 INJECTION INTRAMUSCULAR; INTRAVENOUS
Status: DISCONTINUED | OUTPATIENT
Start: 2023-03-12 | End: 2023-03-12 | Stop reason: HOSPADM

## 2023-03-12 RX ORDER — ALBUMIN, HUMAN INJ 5% 5 %
SOLUTION INTRAVENOUS PRN
Status: DISCONTINUED | OUTPATIENT
Start: 2023-03-12 | End: 2023-03-12 | Stop reason: SDUPTHER

## 2023-03-12 RX ORDER — ACETAMINOPHEN 325 MG/1
650 TABLET ORAL
Status: DISCONTINUED | OUTPATIENT
Start: 2023-03-12 | End: 2023-03-12 | Stop reason: HOSPADM

## 2023-03-12 RX ORDER — FENTANYL CITRATE 50 UG/ML
INJECTION, SOLUTION INTRAMUSCULAR; INTRAVENOUS PRN
Status: DISCONTINUED | OUTPATIENT
Start: 2023-03-12 | End: 2023-03-12 | Stop reason: SDUPTHER

## 2023-03-12 RX ORDER — NEOSTIGMINE METHYLSULFATE 1 MG/ML
INJECTION, SOLUTION INTRAVENOUS PRN
Status: DISCONTINUED | OUTPATIENT
Start: 2023-03-12 | End: 2023-03-12 | Stop reason: SDUPTHER

## 2023-03-12 RX ORDER — PHENYLEPHRINE HCL IN 0.9% NACL 1 MG/10 ML
SYRINGE (ML) INTRAVENOUS PRN
Status: DISCONTINUED | OUTPATIENT
Start: 2023-03-12 | End: 2023-03-12 | Stop reason: SDUPTHER

## 2023-03-12 RX ORDER — IPRATROPIUM BROMIDE AND ALBUTEROL SULFATE 2.5; .5 MG/3ML; MG/3ML
1 SOLUTION RESPIRATORY (INHALATION)
Status: DISCONTINUED | OUTPATIENT
Start: 2023-03-12 | End: 2023-03-12 | Stop reason: HOSPADM

## 2023-03-12 RX ORDER — GLYCOPYRROLATE 0.2 MG/ML
INJECTION INTRAMUSCULAR; INTRAVENOUS PRN
Status: DISCONTINUED | OUTPATIENT
Start: 2023-03-12 | End: 2023-03-12 | Stop reason: SDUPTHER

## 2023-03-12 RX ORDER — LIDOCAINE HYDROCHLORIDE 20 MG/ML
INJECTION, SOLUTION INTRAVENOUS PRN
Status: DISCONTINUED | OUTPATIENT
Start: 2023-03-12 | End: 2023-03-12 | Stop reason: SDUPTHER

## 2023-03-12 RX ORDER — DEXAMETHASONE SODIUM PHOSPHATE 10 MG/ML
INJECTION INTRAMUSCULAR; INTRAVENOUS PRN
Status: DISCONTINUED | OUTPATIENT
Start: 2023-03-12 | End: 2023-03-12 | Stop reason: SDUPTHER

## 2023-03-12 RX ORDER — ROCURONIUM BROMIDE 10 MG/ML
INJECTION, SOLUTION INTRAVENOUS PRN
Status: DISCONTINUED | OUTPATIENT
Start: 2023-03-12 | End: 2023-03-12 | Stop reason: SDUPTHER

## 2023-03-12 RX ORDER — DIPHENHYDRAMINE HYDROCHLORIDE 50 MG/ML
12.5 INJECTION INTRAMUSCULAR; INTRAVENOUS
Status: DISCONTINUED | OUTPATIENT
Start: 2023-03-12 | End: 2023-03-12 | Stop reason: HOSPADM

## 2023-03-12 RX ORDER — SODIUM CHLORIDE 9 MG/ML
INJECTION, SOLUTION INTRAVENOUS CONTINUOUS PRN
Status: DISCONTINUED | OUTPATIENT
Start: 2023-03-12 | End: 2023-03-12 | Stop reason: SDUPTHER

## 2023-03-12 RX ORDER — SODIUM CHLORIDE 9 MG/ML
25 INJECTION, SOLUTION INTRAVENOUS PRN
Status: DISCONTINUED | OUTPATIENT
Start: 2023-03-12 | End: 2023-03-12 | Stop reason: HOSPADM

## 2023-03-12 RX ORDER — CEFAZOLIN SODIUM 1 G/3ML
INJECTION, POWDER, FOR SOLUTION INTRAMUSCULAR; INTRAVENOUS PRN
Status: DISCONTINUED | OUTPATIENT
Start: 2023-03-12 | End: 2023-03-12 | Stop reason: SDUPTHER

## 2023-03-12 RX ORDER — SODIUM CHLORIDE 0.9 % (FLUSH) 0.9 %
5-40 SYRINGE (ML) INJECTION EVERY 12 HOURS SCHEDULED
Status: DISCONTINUED | OUTPATIENT
Start: 2023-03-12 | End: 2023-03-12 | Stop reason: HOSPADM

## 2023-03-12 RX ORDER — ONDANSETRON 2 MG/ML
INJECTION INTRAMUSCULAR; INTRAVENOUS PRN
Status: DISCONTINUED | OUTPATIENT
Start: 2023-03-12 | End: 2023-03-12 | Stop reason: SDUPTHER

## 2023-03-12 RX ORDER — SUCCINYLCHOLINE/SOD CL,ISO/PF 200MG/10ML
SYRINGE (ML) INTRAVENOUS PRN
Status: DISCONTINUED | OUTPATIENT
Start: 2023-03-12 | End: 2023-03-12 | Stop reason: SDUPTHER

## 2023-03-12 RX ORDER — PROPOFOL 10 MG/ML
INJECTION, EMULSION INTRAVENOUS PRN
Status: DISCONTINUED | OUTPATIENT
Start: 2023-03-12 | End: 2023-03-12 | Stop reason: SDUPTHER

## 2023-03-12 RX ORDER — ONDANSETRON 2 MG/ML
4 INJECTION INTRAMUSCULAR; INTRAVENOUS
Status: DISCONTINUED | OUTPATIENT
Start: 2023-03-12 | End: 2023-03-12 | Stop reason: HOSPADM

## 2023-03-12 RX ORDER — DROPERIDOL 2.5 MG/ML
0.62 INJECTION, SOLUTION INTRAMUSCULAR; INTRAVENOUS
Status: DISCONTINUED | OUTPATIENT
Start: 2023-03-12 | End: 2023-03-12 | Stop reason: HOSPADM

## 2023-03-12 RX ORDER — SODIUM CHLORIDE 0.9 % (FLUSH) 0.9 %
5-40 SYRINGE (ML) INJECTION PRN
Status: DISCONTINUED | OUTPATIENT
Start: 2023-03-12 | End: 2023-03-12 | Stop reason: HOSPADM

## 2023-03-12 RX ORDER — LABETALOL HYDROCHLORIDE 5 MG/ML
5 INJECTION, SOLUTION INTRAVENOUS
Status: DISCONTINUED | OUTPATIENT
Start: 2023-03-12 | End: 2023-03-12 | Stop reason: HOSPADM

## 2023-03-12 RX ORDER — MIDAZOLAM HYDROCHLORIDE 2 MG/2ML
2 INJECTION, SOLUTION INTRAMUSCULAR; INTRAVENOUS
Status: DISCONTINUED | OUTPATIENT
Start: 2023-03-12 | End: 2023-03-12 | Stop reason: HOSPADM

## 2023-03-12 RX ADMIN — FENTANYL CITRATE 100 MCG: 50 INJECTION, SOLUTION INTRAMUSCULAR; INTRAVENOUS at 09:12

## 2023-03-12 RX ADMIN — SODIUM CHLORIDE: 9 INJECTION, SOLUTION INTRAVENOUS at 10:09

## 2023-03-12 RX ADMIN — Medication 3 MG: at 11:16

## 2023-03-12 RX ADMIN — DEXAMETHASONE SODIUM PHOSPHATE 10 MG: 10 INJECTION INTRAMUSCULAR; INTRAVENOUS at 09:24

## 2023-03-12 RX ADMIN — SODIUM CHLORIDE: 9 INJECTION, SOLUTION INTRAVENOUS at 18:01

## 2023-03-12 RX ADMIN — Medication 200 MCG: at 10:36

## 2023-03-12 RX ADMIN — SODIUM CHLORIDE: 9 INJECTION, SOLUTION INTRAVENOUS at 06:04

## 2023-03-12 RX ADMIN — HYDROMORPHONE HYDROCHLORIDE 0.5 MG: 1 INJECTION, SOLUTION INTRAMUSCULAR; INTRAVENOUS; SUBCUTANEOUS at 12:20

## 2023-03-12 RX ADMIN — HYDROMORPHONE HYDROCHLORIDE 1 MG: 1 INJECTION, SOLUTION INTRAMUSCULAR; INTRAVENOUS; SUBCUTANEOUS at 21:50

## 2023-03-12 RX ADMIN — FENTANYL CITRATE 100 MCG: 50 INJECTION, SOLUTION INTRAMUSCULAR; INTRAVENOUS at 11:29

## 2023-03-12 RX ADMIN — HYDROMORPHONE HYDROCHLORIDE 1 MG: 1 INJECTION, SOLUTION INTRAMUSCULAR; INTRAVENOUS; SUBCUTANEOUS at 01:43

## 2023-03-12 RX ADMIN — Medication 200 MCG: at 09:19

## 2023-03-12 RX ADMIN — ROCURONIUM BROMIDE 50 MG: 10 INJECTION, SOLUTION INTRAVENOUS at 09:24

## 2023-03-12 RX ADMIN — ALBUMIN (HUMAN) 25 G: 12.5 INJECTION, SOLUTION INTRAVENOUS at 11:05

## 2023-03-12 RX ADMIN — ONDANSETRON 4 MG: 2 INJECTION INTRAMUSCULAR; INTRAVENOUS at 09:24

## 2023-03-12 RX ADMIN — SODIUM CHLORIDE, PRESERVATIVE FREE 10 ML: 5 INJECTION INTRAVENOUS at 01:43

## 2023-03-12 RX ADMIN — SODIUM CHLORIDE, PRESERVATIVE FREE 10 ML: 5 INJECTION INTRAVENOUS at 04:50

## 2023-03-12 RX ADMIN — SODIUM CHLORIDE, PRESERVATIVE FREE 10 ML: 5 INJECTION INTRAVENOUS at 21:50

## 2023-03-12 RX ADMIN — CEFAZOLIN 2 G: 1 INJECTION, POWDER, FOR SOLUTION INTRAMUSCULAR; INTRAVENOUS at 09:24

## 2023-03-12 RX ADMIN — PROPOFOL 150 MG: 10 INJECTION, EMULSION INTRAVENOUS at 09:12

## 2023-03-12 RX ADMIN — LIDOCAINE HYDROCHLORIDE 100 MG: 20 INJECTION, SOLUTION INTRAVENOUS at 09:12

## 2023-03-12 RX ADMIN — Medication 160 MG: at 09:12

## 2023-03-12 RX ADMIN — GLYCOPYRROLATE 0.4 MG: 0.2 INJECTION INTRAMUSCULAR; INTRAVENOUS at 11:16

## 2023-03-12 RX ADMIN — Medication 200 MCG: at 10:57

## 2023-03-12 RX ADMIN — FENTANYL CITRATE 50 MCG: 50 INJECTION, SOLUTION INTRAMUSCULAR; INTRAVENOUS at 09:47

## 2023-03-12 RX ADMIN — SODIUM CHLORIDE: 9 INJECTION, SOLUTION INTRAVENOUS at 10:36

## 2023-03-12 RX ADMIN — Medication 200 MCG: at 09:25

## 2023-03-12 RX ADMIN — HYDROMORPHONE HYDROCHLORIDE 0.5 MG: 1 INJECTION, SOLUTION INTRAMUSCULAR; INTRAVENOUS; SUBCUTANEOUS at 12:30

## 2023-03-12 RX ADMIN — HYDROMORPHONE HYDROCHLORIDE 1 MG: 1 INJECTION, SOLUTION INTRAMUSCULAR; INTRAVENOUS; SUBCUTANEOUS at 04:50

## 2023-03-12 RX ADMIN — SODIUM CHLORIDE: 9 INJECTION, SOLUTION INTRAVENOUS at 09:05

## 2023-03-12 ASSESSMENT — PAIN DESCRIPTION - LOCATION
LOCATION: ABDOMEN

## 2023-03-12 ASSESSMENT — PAIN - FUNCTIONAL ASSESSMENT
PAIN_FUNCTIONAL_ASSESSMENT: ACTIVITIES ARE NOT PREVENTED
PAIN_FUNCTIONAL_ASSESSMENT: PREVENTS OR INTERFERES SOME ACTIVE ACTIVITIES AND ADLS

## 2023-03-12 ASSESSMENT — PAIN DESCRIPTION - DESCRIPTORS
DESCRIPTORS: ACHING;SHARP
DESCRIPTORS: ACHING;SHARP
DESCRIPTORS: ACHING;DISCOMFORT
DESCRIPTORS: SHOOTING;SORE;SHARP

## 2023-03-12 ASSESSMENT — PAIN DESCRIPTION - ORIENTATION
ORIENTATION: RIGHT;LEFT;LOWER;UPPER
ORIENTATION: RIGHT;LEFT;LOWER;UPPER
ORIENTATION: MID

## 2023-03-12 ASSESSMENT — PAIN SCALES - GENERAL
PAINLEVEL_OUTOF10: 8
PAINLEVEL_OUTOF10: 8
PAINLEVEL_OUTOF10: 7
PAINLEVEL_OUTOF10: 4
PAINLEVEL_OUTOF10: 7
PAINLEVEL_OUTOF10: 9

## 2023-03-12 NOTE — PROGRESS NOTES
(1.727 m)   Wt 126 lb 6.4 oz (57.3 kg)   SpO2 96%   BMI 19.22 kg/m²     General appearance:  awake, alert, and oriented to person, place, time, and purpose; appears stated age and cooperative; no apparent distress no labored breathing  HEENT:  Conjunctivae/corneas clear. Neck: Supple. No jugular venous distention. Respiratory: symmetrical; clear to auscultation bilaterally; no wheezes; no rhonchi; no rales  Cardiovascular: rhythm regular; rate controlled; no murmurs  Abdomen: Soft, nontender, nondistended  Extremities:  peripheral pulses present; no peripheral edema; no ulcers  Musculoskeletal: No clubbing, cyanosis, no bilateral lower extremity edema. Brisk capillary refill. Skin:  No rashes  on visible skin  Neurologic: awake, alert and following commands     ASSESSMENT and PLAN:    Small bowel obstruction   General surgery consulted  NGT to [facility]   OR today for exploratory laparotomy, possible bowel resection, possible ostomy  Supportive management     Acute kidney injury- stable   Slightly improved today  Continue IVFs   Renal US negative   Will consult Nephrology if not improving     Essential HTN   Was holding home Norvasc and lisinopril in the setting of acute illness and soft BPs  Consider resuming  Norvasc postoperatively if hypertensive  Continue holding lisinopril due to NURIA    BPH   Holding home Flomax in the setting of illness and soft pressures         DVT Prophylaxis [x] Lovenox, []  Heparin, [] SCDs, [] Ambulation   GI Prophylaxis [] PPI,  [] H2 Blocker,  [] Carafate,  [x] Diet/Tube Feeds   Disposition Patient requires continued admission due to SBO with NGT in place.  Taken to OR today for ex lap   MDM [] Low, [x] Moderate,[]  High       Medications:  REVIEWED DAILY    Infusion Medications    sodium chloride 125 mL/hr at 03/12/23 0604    sodium chloride       Scheduled Medications    sodium chloride flush  10 mL IntraVENous 2 times per day    enoxaparin  40 mg SubCUTAneous Daily     PRN Meds: HYDROmorphone **OR** HYDROmorphone, sodium chloride flush, sodium chloride, promethazine **OR** ondansetron, polyethylene glycol, acetaminophen **OR** acetaminophen    Labs:     Recent Labs     03/10/23  1720 03/11/23  0518   WBC 3.7* 4.5   HGB 15.3 13.8   HCT 47.3 43.9    313         Recent Labs     03/11/23  0518 03/11/23 2020 03/12/23  0443    145 146   K 3.7 3.8 4.2    101 103   CO2 26 31* 28   BUN 65* 75* 70*   CREATININE 1.8* 1.7* 1.6*   CALCIUM 8.0* 8.4* 8.1*         Recent Labs     03/10/23  1720 03/11/23  0518   PROT 7.5 6.1*   ALKPHOS 70 60   ALT 8 7   AST 13 11   BILITOT 1.7* 1.1   LIPASE 14  --          No results for input(s): INR in the last 72 hours. No results for input(s): Janora Sauger in the last 72 hours. Chronic labs:    Lab Results   Component Value Date    INR 1.1 07/13/2022       Radiology: REVIEWED DAILY    +++++++++++++++++++++++++++++++++++++++++++++++++  Mickey Fan MD  [employer] - [address], [place]  +++++++++++++++++++++++++++++++++++++++++++++++++  NOTE: This report was transcribed using voice recognition software. Every effort was made to ensure accuracy; however, inadvertent computerized transcription errors may be present.

## 2023-03-12 NOTE — ANESTHESIA POSTPROCEDURE EVALUATION
Department of Anesthesiology  Postprocedure Note    Patient: Maurice Resendez  MRN: [id number]  YOB: 1955  Date of evaluation: 3/12/2023      Procedure Summary     Date: 03/12/23 Room / Location: [facility] OR 05 / [facility]    Anesthesia Start: 6037 Anesthesia Stop: 6998    Procedure: Exploratory Laparotomy, Reduction of Internal Hernia (Abdomen) Diagnosis:       Small bowel obstruction ([address]      (POSSIBLE SMALL BOWEL OBSTRUCTION)    Surgeons: Fercho Silver MD Responsible Provider: Kirk Ross MD    Anesthesia Type: general ASA Status: 3          Anesthesia Type: No value filed.     Jorge Phase I:      Jorge Phase II:        Anesthesia Post Evaluation    Patient location during evaluation: PACU  Patient participation: complete - patient participated  Level of consciousness: awake and alert  Airway patency: patent  Nausea & Vomiting: no nausea and no vomiting  Complications: no  Cardiovascular status: hemodynamically stable  Respiratory status: acceptable  Hydration status: euvolemic

## 2023-03-12 NOTE — ANESTHESIA PRE PROCEDURE
Department of Anesthesiology  Preprocedure Note       Name:  Matthew Range   Age:  75 y.o.  :  1955                                          MRN:  [id number]         Date:  3/12/2023      Surgeon: Lashell Egan):  Shaq Galvan MD    Procedure: Procedure(s):  POSSIBLE LAPAROTOMY EXPLORATORY, POSSIBLE SMALL BOWEL OBSTRUCTION    Medications prior to admission:   Prior to Admission medications    Medication Sig Start Date End Date Taking? Authorizing Provider   polyethylene glycol (MIRALAX) 17 GM/SCOOP powder Take 17 g by mouth daily 3/7/23 4/6/23  Francesca Hand MD   cyclobenzaprine (FLEXERIL) 5 MG tablet Take 5 mg by mouth 2 times daily 22   Historical Provider, MD   oxyCODONE (ROXICODONE) 5 MG immediate release tablet Take 5 mg by mouth every 4 hours as needed for Pain. Historical Provider, MD   lisinopril (PRINIVIL;ZESTRIL) 2.5 MG tablet Take 2.5 mg by mouth daily    Historical Provider, MD   amLODIPine (NORVASC) 10 MG tablet Take 10 mg by mouth daily    Historical Provider, MD   Cholecalciferol (VITAMIN D) 50 MCG (2000) CAPS capsule Take by mouth Takes at noon    Historical Provider, MD   docusate (COLACE, DULCOLAX) 100 MG CAPS TAKE ONE CAPSULE BY MOUTH TWICE A DAY 21   Historical Provider, MD   sildenafil (VIAGRA) 100 MG tablet  21   Historical Provider, MD   tamsulosin (FLOMAX) 0.4 MG capsule TAKE ONE CAPSULE BY MOUTH AT BEDTIME 21   Historical Provider, MD   ibuprofen (IBU) 800 MG tablet Take 1 tablet by mouth every 8 hours as needed for Pain Take with food.  20  REBECCA Delgadillo - NP       Current medications:    Current Facility-Administered Medications   Medication Dose Route Frequency Provider Last Rate Last Admin    HYDROmorphone (DILAUDID) injection 0.5 mg  0.5 mg IntraVENous Q2H PRN Melita Desai MD        Or    HYDROmorphone (DILAUDID) injection 1 mg  1 mg IntraVENous Q2H PRN Melita Desai MD   1 mg at 23 0450    ceFAZolin (ANCEF) 2,000 mg in sterile water 20 mL IV syringe  2,000 mg IntraVENous Once Aubrey Bagent, DO        0.9 % sodium chloride infusion   IntraVENous Continuous Aubrey Bagent,  mL/hr at 03/12/23 0604 New Bag at 03/12/23 0604    sodium chloride flush 0.9 % injection 10 mL  10 mL IntraVENous 2 times per day Eitan Hudson, DO   10 mL at 03/11/23 2051    sodium chloride flush 0.9 % injection 10 mL  10 mL IntraVENous PRN Eitan Hudson, DO   10 mL at 03/12/23 0450    0.9 % sodium chloride infusion   IntraVENous PRN Eitan Starkeye, DO        enoxaparin (LOVENOX) injection 40 mg  40 mg SubCUTAneous Daily Eitan Hudson, DO   40 mg at 03/11/23 0833    promethazine (PHENERGAN) tablet 12.5 mg  12.5 mg Oral Q6H PRN Eitan Hudson, DO        Or    ondansetron [facility]) injection 4 mg  4 mg IntraVENous Q6H PRN Eitan Hudson, DO        polyethylene glycol (GLYCOLAX) packet 17 g  17 g Oral Daily PRN Eitan Hudson, DO        acetaminophen (TYLENOL) tablet 650 mg  650 mg Oral Q6H PRN Eitan Hudson, DO        Or    acetaminophen (TYLENOL) suppository 650 mg  650 mg Rectal Q6H PRN Eitan Hudson, DO           Allergies:  No Known Allergies    Problem List:    Patient Active Problem List   Diagnosis Code    Cervical spinal stenosis M48.02    Cervical stenosis of spinal canal M48.02    Cervical myelopathy ([address] G95.9    Cervical stenosis of spine M48.02    Severe protein-calorie malnutrition ([address] E43    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    SBO (small bowel obstruction) ([address] K56.609    Small bowel obstruction ([address] K56.609       Past Medical History:        Diagnosis Date    Cancer [facility]) 2013    Prostate       Past Surgical History:        Procedure Laterality Date    ABDOMEN SURGERY      BACK SURGERY      CERVICAL FUSION N/A 07/18/2022    ANTERIOR C3-C4, C4-C5,  AND C5-C6 CERVICAL DISCECTOMY AND FUSION performed by Kinga Recio MD at [address] [facility] N/A 07/18/2022    POSTERIOR C3-C7 LAMINECTOMY, POSTERIOR C3-T1 FUSION performed by Conor Posada MD at [address] [facility] N/A 2022    LAPAROSCOPIC POSSIBLE OPEN PLACEMENT OF GASTROSTOMY TUBE WANTS AS EARLY AS POSSIBLE TO FOLLOW OTHER ADDONS performed by Machelle Champion MD at [place] [address] N/A 2022    PHARYNGEAL TEAR REPAIR performed by Conor Posada MD at [address] History:    Social History     Tobacco Use    Smoking status: Former     Packs/day: 0.50     Years: 30.00     Pack years: 15.00     Types: Cigarettes     Quit date: 2018     Years since quittin.7    Smokeless tobacco: Never    Tobacco comments:     1-2 cigarettes a day   Substance Use Topics    Alcohol use: No                                Counseling given: Not Answered  Tobacco comments: 1-2 cigarettes a day      Vital Signs (Current):   Vitals:    23 2340 23 0143 23 0450 23 0820   BP:    (!) 136/104   Pulse:    92   Resp:  18    Temp:    96.8 °F (36 °C)   TempSrc:    Temporal   SpO2:    96%   Weight:       Height:                                                  BP Readings from Last 3 Encounters:   23 (!) 136/104   23 (!) 145/98   10/11/22 102/78       NPO Status: Time of last liquid consumption:  (ice chips)                                                 Date of last liquid consumption: 23                        Date of last solid food consumption: 03/10/23    BMI:   Wt Readings from Last 3 Encounters:   03/10/23 126 lb 6.4 oz (57.3 kg)   23 130 lb (59 kg)   10/11/22 122 lb (55.3 kg)     Body mass index is 19.22 kg/m².     CBC:   Lab Results   Component Value Date/Time    WBC 4.5 2023 05:18 AM    RBC 5.08 2023 05:18 AM    HGB 13.8 2023 05:18 AM    HCT 43.9 2023 05:18 AM    MCV 86.4 2023 05:18 AM    RDW 17.2 2023 05:18 AM     2023 05:18 AM       CMP:   Lab Results   Component Value Date/Time     03/12/2023 04:43 AM    K 4.2 03/12/2023 04:43 AM     03/12/2023 04:43 AM    CO2 28 03/12/2023 04:43 AM    BUN 70 03/12/2023 04:43 AM    CREATININE 1.6 03/12/2023 04:43 AM    GFRAA >60 08/05/2022 05:15 AM    LABGLOM 46 03/12/2023 04:43 AM    GLUCOSE 86 03/12/2023 04:43 AM    PROT 6.1 03/11/2023 05:18 AM    CALCIUM 8.1 03/12/2023 04:43 AM    BILITOT 1.1 03/11/2023 05:18 AM    ALKPHOS 60 03/11/2023 05:18 AM    AST 11 03/11/2023 05:18 AM    ALT 7 03/11/2023 05:18 AM       POC Tests: No results for input(s): POCGLU, POCNA, POCK, POCCL, POCBUN, POCHEMO, POCHCT in the last 72 hours. Coags:   Lab Results   Component Value Date/Time    PROTIME 11.7 07/13/2022 09:45 AM    INR 1.1 07/13/2022 09:45 AM    APTT 30.0 09/09/2020 01:35 PM       HCG (If Applicable): No results found for: PREGTESTUR, PREGSERUM, HCG, HCGQUANT     ABGs: No results found for: PHART, PO2ART, TVM8DOO, OPH8YPT, BEART, [id number]     Type & Screen (If Applicable):  No results found for: LABABO, LABRH    Drug/Infectious Status (If Applicable):  No results found for: HIV, HEPCAB    COVID-19 Screening (If Applicable): No results found for: COVID19        Anesthesia Evaluation  Patient summary reviewed no history of anesthetic complications:   Airway: Mallampati: II  TM distance: >3 FB   Neck ROM: limited     Dental:          Pulmonary: breath sounds clear to auscultation                            ROS comment: S/p pharyngeal tear    XR abdomen 3/10/2023:  FINDINGS:  Nasogastric tube courses below the level of the diaphragm with distal tip in  the expected location of the stomach.  Multiple distended segments of bowel  seen in visualized upper abdomen.  No free air beneath the hemidiaphragms.       CTA chest 3/7/2023  No evidence of pulmonary embolism or acute pulmonary abnormality.     Aneurysmal supravalvular ascending aorta measuring up to 4.2 cm without  evidence for acute abnormality thoracic aorta on limited evaluation no  obvious dissection.     Abdomen and pelvis reveals fluid-filled small bowel largely distended  borderline dilated with components of minimal air-fluid levels however no  distinct transition point with large colonic stool burden concerning for an  ileus pattern or obstipation pattern given distal colonic stool burden  moderate to large involvement which may be developing a small bowel  obstructive process or from stasis without perforation or abscess       Cardiovascular:  Exercise tolerance: good (>4 METS),   (+) hypertension:,       ECG reviewed  Rhythm: regular  Rate: normal    Stress test reviewed             ROS comment: Stress Test 7/2022: LVEF 67%, no stress induced ischemia    EKG 3/2023:  Sinus tachycardia with occasional premature ventricular complexes  Possible Inferior infarct , age undetermined  Abnormal ECG  When compared with ECG of 07-MAR-2023 00:06,  Significant changes have occurred    Elevated troponin     Neuro/Psych:                ROS comment: Cervical spinal stenosis  Cervical myelopathy  Cervical stenosis of spine  S/p ACDF    XR C-spine 3/2023:  Fusion identified of the cervical spine stable and unchanged with no hardware  complication or malalignment.     GI/Hepatic/Renal:   (+) renal disease: ARF,          ROS comment: SBO 3/2023  Hx of G-tube surgery    CT A/P 3/10/2023:    1. Limited examination due to paucity of intra-and pelvic fat.  2. Multiple distended segments of small bowel throughout the abdomen which  have increased in diameter compared to prior from March 7, 2023.  Findings  could suggest distal small bowel obstruction.  Large bowel is nondilated.  3. Moderate distension of the stomach.  4. Minimal free fluid in the pelvis.  5. New small right pleural effusion with adjacent atelectatic changes.    .   Endo/Other:    (+) malignancy/cancer (Prostate CA ).                  ROS comment: BPH Abdominal:             Vascular: negative vascular ROS.         Other Findings:           Anesthesia  Plan      general     ASA 3       Induction: intravenous and rapid sequence. MIPS: Postoperative opioids intended, Prophylactic antiemetics administered and Postoperative trial extubation. Anesthetic plan and risks discussed with patient. Use of blood products discussed with patient whom. Plan discussed with CRNA.                     Chaparro Maria MD   3/12/2023

## 2023-03-12 NOTE — PROGRESS NOTES
GENERAL SURGERY  DAILY PROGRESS NOTE  3/12/2023  Chief Complaint   Patient presents with    Bloated     Distended abd, emesis, no bm for 1 wk         Subjective:  No bowel function, still distended. For OR today    Objective:  BP (!) 136/104   Pulse 92   Temp 96.8 °F (36 °C) (Temporal)   Resp 18   Ht 5' 8\" (1.727 m)   Wt 126 lb 6.4 oz (57.3 kg)   SpO2 96%   BMI 19.22 kg/m²     NG tube: 1375 bilious  Urine: 650    GENERAL:  Laying in bed, awake, alert, cooperative  HEAD: Normocephalic, atraumatic, NG tube  EYES: No sclera icterus, pupils equal  LUNGS:  No increased work of breathing, on 3L NC  CARDIOVASCULAR:  regular rate and rhythm  ABDOMEN: Easily reducible umbilical hernia, soft, very distended, tender to palpation throughout the abdomen moderately, no peritoneal signs  EXTREMITIES: No edema or swelling  SKIN: Warm and dry, no rashes or lesions    Assessment/Plan:  75 y.o. male with a small bowel obstruction most likely secondary to adhesive disease. Patient also has an easily reducible umbilical hernia on exam    N.p.o.   IV fluids  NG tube to low intermittent wall suction  Pain/antinausea control  OR today for exploratory laparotomy, possible bowel resection, possible ostomy  Attempted to call patient's sister Thomas Brunner per patient request but it went straight to voicemail.        Electronically signed by Suzie Mas MD on 3/12/2023 at 8:59 AM

## 2023-03-12 NOTE — PLAN OF CARE
Problem: Discharge Planning  Goal: Discharge to home or other facility with appropriate resources  Outcome: Progressing  Flowsheets (Taken 3/12/2023 1400)  Discharge to home or other facility with appropriate resources: Identify barriers to discharge with patient and caregiver     Problem: Pain  Goal: Verbalizes/displays adequate comfort level or baseline comfort level  Outcome: Progressing     Problem: Safety - Adult  Goal: Free from fall injury  Outcome: Progressing  Flowsheets (Taken 3/12/2023 1655)  Free From Fall Injury: Instruct family/caregiver on patient safety     Problem: Skin/Tissue Integrity  Goal: Absence of new skin breakdown  Description: 1. Monitor for areas of redness and/or skin breakdown  2. Assess vascular access sites hourly  3. Every 4-6 hours minimum:  Change oxygen saturation probe site  4. Every 4-6 hours:  If on nasal continuous positive airway pressure, respiratory therapy assess nares and determine need for appliance change or resting period.   Outcome: Progressing     Problem: ABCDS Injury Assessment  Goal: Absence of physical injury  Outcome: Progressing

## 2023-03-12 NOTE — OP NOTE
Operative Note      Patient: Peace Dumont  YOB: 1955  MRN: [id number]    Date of Procedure: 3/12/2023    Pre-Op Diagnosis: POSSIBLE SMALL BOWEL OBSTRUCTION    Post-Op Diagnosis: Same internal hernia       Procedure(s):  Exploratory Laparotomy, Reduction of Internal Hernia    Surgeon(s):  Crissy Blanchard MD    Assistant:   Resident: Karen Christine MD PGY 4    Anesthesia: General    Estimated Blood Loss (mL): 10    Complications: None    Specimens:   ID Type Source Tests Collected by Time Destination   A : Hernia Sack Tissue Tissue SURGICAL PATHOLOGY Crissy Blanchard MD 3/12/2023 1101        Implants:  * No implants in log *      Drains:   NG/OG/NJ/NE Tube Nasogastric 16 fr Left nostril (Active)   Surrounding Skin Clean, dry & intact 03/11/23 2307   Securement device Tape 03/11/23 2307   Status Suction-low intermittent 03/11/23 2307   Placement Verified X-Ray (Initial) 03/11/23 2307   NG/OG/NJ/NE External Measurement (cm) 57 cm 03/11/23 2307   Drainage Appearance Brown 03/11/23 2307   Output (mL) 250 ml 03/12/23 2108   Action Taken Retaped 03/11/23 1518       Gastrostomy/Enterostomy/Jejunostomy Tube Gastrostomy LUQ 1 22 fr (Active)       Urinary Catheter 03/12/23 Miranda (Active)       [REMOVED] Urinary Catheter 03/11/23 Miranda (Removed)   $ Urethral catheter insertion $ Not inserted for procedure 03/11/23 2307   Catheter Indications Urinary retention (acute or chronic), continuous bladder irrigation or bladder outlet obstruction 03/12/23 0530   Site Assessment No urethral drainage 03/12/23 0530   Urine Color Vandana;Tea 03/12/23 0530   Urine Appearance Clear 03/12/23 0530   Collection Container Standard 03/12/23 0530   Securement Method Leg strap;Securing device (Describe) 03/12/23 0530   Catheter Care  Soap and water 03/12/23 0530   Catheter Best Practices  Drainage tube clipped to bed;Catheter secured to thigh; Bag below bladder;Bag not on floor 03/12/23 0530   Status Draining 03/12/23

## 2023-03-13 ENCOUNTER — APPOINTMENT (OUTPATIENT)
Dept: GENERAL RADIOLOGY | Age: 68
DRG: 356 | End: 2023-03-13
Payer: OTHER GOVERNMENT

## 2023-03-13 LAB
ANION GAP SERPL CALCULATED.3IONS-SCNC: 12 MMOL/L (ref 7–16)
BUN BLDV-MCNC: 46 MG/DL (ref 6–23)
CALCIUM SERPL-MCNC: 7.8 MG/DL (ref 8.6–10.2)
CHLORIDE BLD-SCNC: 112 MMOL/L (ref 98–107)
CO2: 29 MMOL/L (ref 22–29)
CREAT SERPL-MCNC: 1.2 MG/DL (ref 0.7–1.2)
GFR SERPL CREATININE-BSD FRML MDRD: >60 ML/MIN/1.73
GLUCOSE BLD-MCNC: 103 MG/DL (ref 74–99)
POTASSIUM REFLEX MAGNESIUM: 3.8 MMOL/L (ref 3.5–5)
SODIUM BLD-SCNC: 153 MMOL/L (ref 132–146)

## 2023-03-13 PROCEDURE — 80048 BASIC METABOLIC PNL TOTAL CA: CPT

## 2023-03-13 PROCEDURE — 2060000000 HC ICU INTERMEDIATE R&B

## 2023-03-13 PROCEDURE — 2700000000 HC OXYGEN THERAPY PER DAY

## 2023-03-13 PROCEDURE — 2580000003 HC RX 258: Performed by: INTERNAL MEDICINE

## 2023-03-13 PROCEDURE — 6370000000 HC RX 637 (ALT 250 FOR IP): Performed by: STUDENT IN AN ORGANIZED HEALTH CARE EDUCATION/TRAINING PROGRAM

## 2023-03-13 PROCEDURE — 74018 RADEX ABDOMEN 1 VIEW: CPT

## 2023-03-13 PROCEDURE — 71045 X-RAY EXAM CHEST 1 VIEW: CPT

## 2023-03-13 PROCEDURE — 36415 COLL VENOUS BLD VENIPUNCTURE: CPT

## 2023-03-13 PROCEDURE — 2580000003 HC RX 258

## 2023-03-13 PROCEDURE — 6360000002 HC RX W HCPCS: Performed by: STUDENT IN AN ORGANIZED HEALTH CARE EDUCATION/TRAINING PROGRAM

## 2023-03-13 PROCEDURE — 99024 POSTOP FOLLOW-UP VISIT: CPT | Performed by: SURGERY

## 2023-03-13 RX ORDER — ACETAMINOPHEN 325 MG/1
650 TABLET ORAL EVERY 6 HOURS
Status: DISCONTINUED | OUTPATIENT
Start: 2023-03-13 | End: 2023-03-23 | Stop reason: HOSPADM

## 2023-03-13 RX ORDER — METHOCARBAMOL 750 MG/1
1500 TABLET, FILM COATED ORAL 4 TIMES DAILY
Status: DISCONTINUED | OUTPATIENT
Start: 2023-03-13 | End: 2023-03-16

## 2023-03-13 RX ORDER — DEXTROSE AND SODIUM CHLORIDE 5; .45 G/100ML; G/100ML
INJECTION, SOLUTION INTRAVENOUS CONTINUOUS
Status: DISCONTINUED | OUTPATIENT
Start: 2023-03-13 | End: 2023-03-15

## 2023-03-13 RX ORDER — SODIUM CHLORIDE, SODIUM LACTATE, POTASSIUM CHLORIDE, CALCIUM CHLORIDE 600; 310; 30; 20 MG/100ML; MG/100ML; MG/100ML; MG/100ML
INJECTION, SOLUTION INTRAVENOUS CONTINUOUS
Status: DISCONTINUED | OUTPATIENT
Start: 2023-03-13 | End: 2023-03-13

## 2023-03-13 RX ADMIN — HYDROMORPHONE HYDROCHLORIDE 1 MG: 1 INJECTION, SOLUTION INTRAMUSCULAR; INTRAVENOUS; SUBCUTANEOUS at 06:36

## 2023-03-13 RX ADMIN — DEXTROSE AND SODIUM CHLORIDE: 5; 450 INJECTION, SOLUTION INTRAVENOUS at 09:01

## 2023-03-13 RX ADMIN — SODIUM CHLORIDE, POTASSIUM CHLORIDE, SODIUM LACTATE AND CALCIUM CHLORIDE: 600; 310; 30; 20 INJECTION, SOLUTION INTRAVENOUS at 03:03

## 2023-03-13 RX ADMIN — METHOCARBAMOL 1500 MG: 750 TABLET ORAL at 13:23

## 2023-03-13 RX ADMIN — HYDROMORPHONE HYDROCHLORIDE 1 MG: 1 INJECTION, SOLUTION INTRAMUSCULAR; INTRAVENOUS; SUBCUTANEOUS at 03:04

## 2023-03-13 RX ADMIN — DEXTROSE AND SODIUM CHLORIDE: 5; 450 INJECTION, SOLUTION INTRAVENOUS at 20:31

## 2023-03-13 RX ADMIN — ENOXAPARIN SODIUM 40 MG: 100 INJECTION SUBCUTANEOUS at 09:02

## 2023-03-13 RX ADMIN — HYDROMORPHONE HYDROCHLORIDE 1 MG: 1 INJECTION, SOLUTION INTRAMUSCULAR; INTRAVENOUS; SUBCUTANEOUS at 21:23

## 2023-03-13 RX ADMIN — ACETAMINOPHEN 650 MG: 325 TABLET ORAL at 13:23

## 2023-03-13 ASSESSMENT — PAIN SCALES - GENERAL
PAINLEVEL_OUTOF10: 8
PAINLEVEL_OUTOF10: 10
PAINLEVEL_OUTOF10: 8
PAINLEVEL_OUTOF10: 10

## 2023-03-13 ASSESSMENT — PAIN DESCRIPTION - ORIENTATION
ORIENTATION: MID

## 2023-03-13 ASSESSMENT — PAIN DESCRIPTION - LOCATION
LOCATION: ABDOMEN

## 2023-03-13 ASSESSMENT — PAIN DESCRIPTION - DESCRIPTORS
DESCRIPTORS: SHOOTING;SORE;SPASM

## 2023-03-13 NOTE — PROGRESS NOTES
Paged Dr. Evans Fess regarding patient ripped his 3rd NG tube out while in restraints. Will not place another one until there is a better plan for this patient.  Ok to not place NG tube

## 2023-03-13 NOTE — PROGRESS NOTES
Occupational Therapy  Attempted OT eval, however, RN requested therapy be held this date as pt was recently placed in restraints after pulling at lines. Will attempt OT assessment at a later time.   Thank you for this referral. ELAINE Gross, OTR/L  # [id number]

## 2023-03-13 NOTE — PROGRESS NOTES
Ambulation   GI Prophylaxis [] PPI,  [] H2 Blocker,  [] Carafate,  [x] Diet/Tube Feeds   Disposition Patient requires continued admission due to SBO with NGT in place. Taken to OR today for ex lap   MDM [] Low, [x] Moderate,[]  High       Medications:  REVIEWED DAILY    Infusion Medications    dextrose 5 % and 0.45 % NaCl 100 mL/hr at 03/13/23 0901    sodium chloride       Scheduled Medications    acetaminophen  650 mg Oral Q6H    methocarbamol  1,500 mg Oral 4x Daily    sodium chloride flush  10 mL IntraVENous 2 times per day    enoxaparin  40 mg SubCUTAneous Daily     PRN Meds: HYDROmorphone **OR** HYDROmorphone, sodium chloride flush, sodium chloride, promethazine **OR** ondansetron, polyethylene glycol    Labs:     Recent Labs     03/10/23  1720 03/11/23  0518   WBC 3.7* 4.5   HGB 15.3 13.8   HCT 47.3 43.9    313         Recent Labs     03/12/23  0443 03/12/23  1749 03/13/23  0511    151* 153*   K 4.2 3.8 3.8    110* 112*   CO2 28 28 29   BUN 70* 60* 46*   CREATININE 1.6* 1.4* 1.2   CALCIUM 8.1* 7.5* 7.8*         Recent Labs     03/10/23  1720 03/11/23  0518   PROT 7.5 6.1*   ALKPHOS 70 60   ALT 8 7   AST 13 11   BILITOT 1.7* 1.1   LIPASE 14  --          No results for input(s): INR in the last 72 hours. No results for input(s): Xavier Stevens in the last 72 hours. Chronic labs:    Lab Results   Component Value Date    INR 1.1 07/13/2022       Radiology: REVIEWED DAILY    +++++++++++++++++++++++++++++++++++++++++++++++++  Carmine Bae MD  [facility] Physician - [address] [facility], [place]  +++++++++++++++++++++++++++++++++++++++++++++++++  NOTE: This report was transcribed using voice recognition software. Every effort was made to ensure accuracy; however, inadvertent computerized transcription errors may be present.

## 2023-03-13 NOTE — ACP (ADVANCE CARE PLANNING)
Advance Care Planning   The patient has the following advanced directives on file:  Advance Directives       Power of [address] Will ACP-Advance Directive ACP-Power of     Not on File Not on File Not on File Not on File            The patient has appointed the following active healthcare agents:    Primary Decision Maker: Armani Jordan - [phone]    Secondary Decision Maker: Edna Meadows - Brother/Sister - [phone]    The Patient has the following current code status:    Code Status: Full Code    Visit Documentation:      Faith Weaver  3/13/2023

## 2023-03-13 NOTE — PROGRESS NOTES
Physical Therapy      Name: Tae Postal  : 1955  MRN: [id number]  Date of Service: 3/13/2023  Room #:  4516/4516-B    PT order received. PT to be held per nursing due to patient agitation. PT will follow up as appropriate.     Janan Dubin, PT, DPT  [id number]

## 2023-03-13 NOTE — PROGRESS NOTES
GENERAL SURGERY  DAILY PROGRESS NOTE  3/13/2023  Chief Complaint   Patient presents with    Bloated     Distended abd, emesis, no bm for 1 wk       Subjective:  Complaining of pain this morning, states pain medication is helping. Denies nausea or emesis. No flatus or BM. NGT with minimal bilious output    Objective:  /88   Pulse 88   Temp 98.7 °F (37.1 °C)   Resp 18   Ht 5' 8\" (1.727 m)   Wt 126 lb 6.4 oz (57.3 kg)   SpO2 95%   BMI 19.22 kg/m²     GENERAL:  Laying in bed, awake, alert, cooperative, no apparent distress  HEAD: Normocephalic, atraumatic  EYES: No sclera icterus, pupils equal  LUNGS:  No increased work of breathing on 2 L nC  CARDIOVASCULAR:  RR  ABDOMEN:  Soft, appropriately tender, moderately distended NGT bilious 250 output recorded. EXTREMITIES: No edema or swelling  SKIN: Warm and dry    Assessment/Plan:  75 y.o. male with SBO s/p exploratory laparotomy, reduction of internal hernia 3/12    - Continue NPO, NGT. Patient pulled out NGT recommend replacing it as patient is distended and high risk for ileus  - IVF, hypernatremia, IVF changed to D5 1/2 NS. - scheduled tylenol and robaxin, on IV dilaudid  - monitor for return of bowel function  - OOB, ambulate.  PT/OT ordered    Plan to be dicussed with Dr. Acuna Army      Electronically signed by Elen Canas MD on 3/13/2023 at 10:13 AM

## 2023-03-13 NOTE — CARE COORDINATION
Care Coordination:  75year old male admitted with distended abdomen and no MB for one week. and NURIA. Underwent lap yesterday for release of  internal hernia. .Continues NPO NGT to suction. Met with patient at bedside and spoke to cousin Julio Greenberg and sister Heather Ordonez on phone to assess for discharge plan. Patient sister Heather Ordonez lives with him, Her phone number is [phone]. Hugo Wall is also involved and is  primary contact. Patient lives in a two floor home. He was discharged from [address] [place] [address] [address]  this fall and sister has been providing assist in dress- ing and  bathing and provides meals and housekeeping. Patient ambulated with walker. Currently on 2L o2 , none at home. PT OT evaluations are pending and if needed family want patient to return to [address] [place] [address] Dr Alvarez Will await PT OT and then make referral.  No hx of [facility]. Patient is  and primary care is received from Dr. Bonny Clement at [facility]. Meds are received from [address] Will need paper scripts  for [facility] at discharge. [facility]  Patient also has Medicare A  . Per notes at last admission patient is only 10% service connected  and would not have skilled coverage from [place] so if needed would be Medicare to SNF. Will follow and await PT OT input .

## 2023-03-13 NOTE — PLAN OF CARE
Problem: Safety - Medical Restraint  Goal: Remains free of injury from restraints (Restraint for Interference with Medical Device)  Description: INTERVENTIONS:  1. Determine that other, less restrictive measures have been tried or would not be effective before applying the restraint  2. Evaluate the patient's condition at the time of restraint application  3. Inform patient/family regarding the reason for restraint  4.  Q2H: Monitor safety, psychosocial status, comfort, nutrition and hydration  Outcome: Progressing  Flowsheets  Taken 3/13/2023 1800  Remains free of injury from restraints (restraint for interference with medical device): Every 2 hours: Monitor safety, psychosocial status, comfort, nutrition and hydration  Taken 3/13/2023 1600  Remains free of injury from restraints (restraint for interference with medical device): Every 2 hours: Monitor safety, psychosocial status, comfort, nutrition and hydration

## 2023-03-13 NOTE — PROGRESS NOTES
[facility]   ATTENDING PHYSICIAN PROGRESS NOTE     I have examined the patient, reviewed the record, and discussed the case with the APN/ Resident. I have reviewed all relevant labs and imaging data. Please refer to the APN/ resident's note. I agree with the assessment and plan with the following corrections/ additions. The following summarizes my clinical findings and independent assessment. CC: SBO    Patient reports ongoing pain in his abdomen. States he has pain with movement. Patient is pulled out to NG tube today. Refusing to allow replacement. 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 Every effort was made to ensure accuracy; however, inadvertent computerized transcription errors may be present.

## 2023-03-14 LAB
ANION GAP SERPL CALCULATED.3IONS-SCNC: 8 MMOL/L (ref 7–16)
ANISOCYTOSIS: ABNORMAL
BASOPHILS ABSOLUTE: 0.02 E9/L (ref 0–0.2)
BASOPHILS RELATIVE PERCENT: 0.6 % (ref 0–2)
BUN BLDV-MCNC: 27 MG/DL (ref 6–23)
BURR CELLS: ABNORMAL
CALCIUM SERPL-MCNC: 8.3 MG/DL (ref 8.6–10.2)
CHLORIDE BLD-SCNC: 111 MMOL/L (ref 98–107)
CO2: 29 MMOL/L (ref 22–29)
CREAT SERPL-MCNC: 0.9 MG/DL (ref 0.7–1.2)
EOSINOPHILS ABSOLUTE: 0.05 E9/L (ref 0.05–0.5)
EOSINOPHILS RELATIVE PERCENT: 1.4 % (ref 0–6)
GFR SERPL CREATININE-BSD FRML MDRD: >60 ML/MIN/1.73
GLUCOSE BLD-MCNC: 117 MG/DL (ref 74–99)
HCT VFR BLD CALC: 40.5 % (ref 37–54)
HEMOGLOBIN: 12.8 G/DL (ref 12.5–16.5)
IMMATURE GRANULOCYTES #: 0.06 E9/L
IMMATURE GRANULOCYTES %: 1.7 % (ref 0–5)
LYMPHOCYTES ABSOLUTE: 0.6 E9/L (ref 1.5–4)
LYMPHOCYTES RELATIVE PERCENT: 17.4 % (ref 20–42)
MAGNESIUM: 2.5 MG/DL (ref 1.6–2.6)
MCH RBC QN AUTO: 27.5 PG (ref 26–35)
MCHC RBC AUTO-ENTMCNC: 31.6 % (ref 32–34.5)
MCV RBC AUTO: 86.9 FL (ref 80–99.9)
MONOCYTES ABSOLUTE: 0.63 E9/L (ref 0.1–0.95)
MONOCYTES RELATIVE PERCENT: 18.3 % (ref 2–12)
NEUTROPHILS ABSOLUTE: 2.09 E9/L (ref 1.8–7.3)
NEUTROPHILS RELATIVE PERCENT: 60.6 % (ref 43–80)
OVALOCYTES: ABNORMAL
PDW BLD-RTO: 17.1 FL (ref 11.5–15)
PLATELET # BLD: 309 E9/L (ref 130–450)
PMV BLD AUTO: 9.9 FL (ref 7–12)
POIKILOCYTES: ABNORMAL
POLYCHROMASIA: ABNORMAL
POTASSIUM REFLEX MAGNESIUM: 3.3 MMOL/L (ref 3.5–5)
POTASSIUM SERPL-SCNC: 3.3 MMOL/L (ref 3.5–5)
RBC # BLD: 4.66 E12/L (ref 3.8–5.8)
SODIUM BLD-SCNC: 148 MMOL/L (ref 132–146)
TARGET CELLS: ABNORMAL
WBC # BLD: 3.5 E9/L (ref 4.5–11.5)

## 2023-03-14 PROCEDURE — 6360000002 HC RX W HCPCS: Performed by: STUDENT IN AN ORGANIZED HEALTH CARE EDUCATION/TRAINING PROGRAM

## 2023-03-14 PROCEDURE — 36415 COLL VENOUS BLD VENIPUNCTURE: CPT

## 2023-03-14 PROCEDURE — 97530 THERAPEUTIC ACTIVITIES: CPT

## 2023-03-14 PROCEDURE — 2580000003 HC RX 258: Performed by: INTERNAL MEDICINE

## 2023-03-14 PROCEDURE — 83735 ASSAY OF MAGNESIUM: CPT

## 2023-03-14 PROCEDURE — 97535 SELF CARE MNGMENT TRAINING: CPT

## 2023-03-14 PROCEDURE — 97165 OT EVAL LOW COMPLEX 30 MIN: CPT

## 2023-03-14 PROCEDURE — 99024 POSTOP FOLLOW-UP VISIT: CPT | Performed by: SURGERY

## 2023-03-14 PROCEDURE — 6370000000 HC RX 637 (ALT 250 FOR IP): Performed by: STUDENT IN AN ORGANIZED HEALTH CARE EDUCATION/TRAINING PROGRAM

## 2023-03-14 PROCEDURE — 6360000002 HC RX W HCPCS: Performed by: FAMILY MEDICINE

## 2023-03-14 PROCEDURE — 80048 BASIC METABOLIC PNL TOTAL CA: CPT

## 2023-03-14 PROCEDURE — 2060000000 HC ICU INTERMEDIATE R&B

## 2023-03-14 PROCEDURE — 2580000003 HC RX 258: Performed by: STUDENT IN AN ORGANIZED HEALTH CARE EDUCATION/TRAINING PROGRAM

## 2023-03-14 PROCEDURE — 97161 PT EVAL LOW COMPLEX 20 MIN: CPT

## 2023-03-14 PROCEDURE — 85025 COMPLETE CBC W/AUTO DIFF WBC: CPT

## 2023-03-14 RX ORDER — POTASSIUM CHLORIDE 7.45 MG/ML
10 INJECTION INTRAVENOUS
Status: DISPENSED | OUTPATIENT
Start: 2023-03-14 | End: 2023-03-14

## 2023-03-14 RX ORDER — POTASSIUM CHLORIDE 7.45 MG/ML
10 INJECTION INTRAVENOUS
Status: COMPLETED | OUTPATIENT
Start: 2023-03-14 | End: 2023-03-14

## 2023-03-14 RX ADMIN — POTASSIUM CHLORIDE 10 MEQ: 7.46 INJECTION, SOLUTION INTRAVENOUS at 17:11

## 2023-03-14 RX ADMIN — METHOCARBAMOL 1500 MG: 750 TABLET ORAL at 23:20

## 2023-03-14 RX ADMIN — POTASSIUM CHLORIDE 10 MEQ: 7.46 INJECTION, SOLUTION INTRAVENOUS at 14:40

## 2023-03-14 RX ADMIN — POTASSIUM CHLORIDE 10 MEQ: 7.46 INJECTION, SOLUTION INTRAVENOUS at 15:47

## 2023-03-14 RX ADMIN — HYDROMORPHONE HYDROCHLORIDE 1 MG: 1 INJECTION, SOLUTION INTRAMUSCULAR; INTRAVENOUS; SUBCUTANEOUS at 05:26

## 2023-03-14 RX ADMIN — HYDROMORPHONE HYDROCHLORIDE 0.5 MG: 1 INJECTION, SOLUTION INTRAMUSCULAR; INTRAVENOUS; SUBCUTANEOUS at 15:55

## 2023-03-14 RX ADMIN — ACETAMINOPHEN 650 MG: 325 TABLET ORAL at 23:23

## 2023-03-14 RX ADMIN — DEXTROSE AND SODIUM CHLORIDE: 5; 450 INJECTION, SOLUTION INTRAVENOUS at 15:45

## 2023-03-14 RX ADMIN — ENOXAPARIN SODIUM 40 MG: 100 INJECTION SUBCUTANEOUS at 08:59

## 2023-03-14 RX ADMIN — POTASSIUM CHLORIDE 10 MEQ: 7.46 INJECTION, SOLUTION INTRAVENOUS at 13:15

## 2023-03-14 RX ADMIN — SODIUM CHLORIDE, PRESERVATIVE FREE 10 ML: 5 INJECTION INTRAVENOUS at 08:59

## 2023-03-14 RX ADMIN — DEXTROSE AND SODIUM CHLORIDE: 5; 450 INJECTION, SOLUTION INTRAVENOUS at 05:35

## 2023-03-14 RX ADMIN — HYDROMORPHONE HYDROCHLORIDE 0.5 MG: 1 INJECTION, SOLUTION INTRAMUSCULAR; INTRAVENOUS; SUBCUTANEOUS at 08:59

## 2023-03-14 ASSESSMENT — PAIN DESCRIPTION - DESCRIPTORS
DESCRIPTORS: SHOOTING;SORE;SPASM
DESCRIPTORS: SHOOTING;SORE;STABBING
DESCRIPTORS: SHOOTING;SORE;STABBING

## 2023-03-14 ASSESSMENT — PAIN SCALES - GENERAL
PAINLEVEL_OUTOF10: 0
PAINLEVEL_OUTOF10: 7
PAINLEVEL_OUTOF10: 0
PAINLEVEL_OUTOF10: 10
PAINLEVEL_OUTOF10: 10

## 2023-03-14 ASSESSMENT — PAIN DESCRIPTION - LOCATION
LOCATION: ABDOMEN

## 2023-03-14 ASSESSMENT — PAIN DESCRIPTION - ORIENTATION
ORIENTATION: MID

## 2023-03-14 ASSESSMENT — PAIN DESCRIPTION - PAIN TYPE: TYPE: ACUTE PAIN;SURGICAL PAIN

## 2023-03-14 ASSESSMENT — PAIN - FUNCTIONAL ASSESSMENT: PAIN_FUNCTIONAL_ASSESSMENT: PREVENTS OR INTERFERES SOME ACTIVE ACTIVITIES AND ADLS

## 2023-03-14 ASSESSMENT — PAIN DESCRIPTION - ONSET: ONSET: ON-GOING

## 2023-03-14 ASSESSMENT — PAIN DESCRIPTION - FREQUENCY: FREQUENCY: CONTINUOUS

## 2023-03-14 NOTE — PROGRESS NOTES
conservation strategies, correct breathing pattern and techniques to improve independence/tolerance for self-care routine  * Functional transfer/mobility training/DME recommendations for increased independence, safety, and fall prevention  * Patient/Family education to increase follow through with safety techniques and functional independence  * Recommendation of environmental modifications for increased safety with functional transfers/mobility and ADLs  * Cognitive retraining/development of therapeutic activities to improve problem solving, judgement, memory, and attention for increased safety/participation in ADL/IADL tasks  * Therapeutic exercise to improve motor endurance, ROM, and functional strength for ADLs/functional transfers  * Therapeutic activities to facilitate/challenge dynamic balance, stand tolerance for increased safety and independence with ADLs  * Therapeutic activities to facilitate gross/fine motor skills for increased independence with ADLs  * Neuro-muscular re-education: facilitation of righting/equilibrium reactions  * Positioning to improve skin integrity, interaction with environment and functional independence  * Delirium prevention/treatment  * Manual techniques for edema management  Other:    Recommended Adaptive Equipment: TBD as pt progresses - BSC, adaptive, equipment      Home Living:  Pt lives with his sister in a 2-story house. Bedroom and half bathroom on the main floor, full bathroom on the 2nd floor. (?) Basement. Bathroom setup:  Tub-Shower, [address] [place] [address] owned:  Rollator, [employer], Shower chair    Available Family Assist:  Sister able to provide assist PRN    Prior Level of Function:  Pt reported receiving assist w/ ADLs, SUP/Assist for Transfers and Mobility using Rollator for ambulation.  Sister completes all IADLs  Driving:  No  Occupation:  \"\"    Pain Level:  9/10 Abdominal Pain;  Relief w/ Rest and Repositioning, Nsg Notified Additional Complaints:  General weakness, limited endurance, SOB - Hypoxia    Cognition: A & O x 3   Able to Follow Multi-Step Commands w/ Mod VC   Memory:  fair    Sequencing:  fair    Problem solving:  fair    Judgement/safety:  fair   Additional Comments:  Pt was pleasant and cooperative. Flat affect, fair eye contact    Vitals/Lab Values:  Found on room air - O2 sats 91% in semi-supine, decreased to 87% seated EOB w/ mild SOB - improved to 91% w/ ~ 1 min of PLB            Functional Assessment:  AM-PAC Daily Activity Raw Score: 9/24     Initial Eval Status  Date: 3-13-23   Treatment Status  Date: STGs = LTGs  Time frame: 10-14 days   Feeding NPO currently    TBA    TBA   Grooming Mod    Able to wash hands/face w/ cloth, digits fixed in flexion limiting fine-motor skills for oral care and hair care  Unable to tolerate ambulating to or standing at sink    Min A  Seated/Standing at the [employer]   UB Dressing Mod A    Simulated - seated EOB - limited endurance, UE ROM    Min A     LB Dressing Dep    Max A to don socks seated EOB, unable to stand for clothing adjustment over hips - assist of 1-2 simulated - bed level    Mod A     Bathing NT    Pt ed re: Benefits of use of Shower chair/Tub Bench    Mod A      Toileting Dep    External Catheter  Assist of 2 - simulated bed level    Mod A     Bed Mobility  Supine to sit:  Max A   Sit to supine:  Max A     VCs for safety, hand placement, use of side-rail     Supine to sit: Min A  Sit to supine: Min A     Functional Transfers NT    Unable to tolerate attempt to stand d/t fatigue/lethargy/general weakness, limited endurance    Mod A     Functional Mobility NT      Mod A     Balance Sitting:     Static:  SUP -> Min A EOB    Dynamic:  Min A w/ functional ax EOB     Standing:     Static:  NT    Dynamic:  NT w/ functional ax/mobility     Sitting:     Static:  Remote SUP    Dynamic:  Remote SUP w/ functional ax    Standing:     Static:  Min A w/ AD PRN    Dynamic:  Mod A w/

## 2023-03-14 NOTE — PROGRESS NOTES
When restraints removed, patient reaches for IV tubing,external catheter, & monitor. Patient oriented x2, but not easily re-directed. ROM done. Bilateral soft wrist restraints maintained for patient safety. No signs of injury noted. Will continue to monitor.

## 2023-03-14 NOTE — CARE COORDINATION
Care Coordination  The patient was admitted  from home with a distended abdomen and he is is post op day #2 exploratory lap reduction of hernia repair for sbo. He is still having pain in his abdomen and has pain with movement. He pulled out his ngt today and will not allow it to be replaced. The patient has a post op ileus. Ot [facility] await pt [facility] 9/24 . Made a referral to [address] [place] [address] [address] to [facility] await if accepted. The patient ids on room air. The patient was on Ivf at 100 cc hr, Iv dilaudid  . 25-.5 mg iv q2 prn .

## 2023-03-14 NOTE — PLAN OF CARE
Problem: Discharge Planning  Goal: Discharge to home or other facility with appropriate resources  Outcome: Progressing     Problem: Pain  Goal: Verbalizes/displays adequate comfort level or baseline comfort level  Outcome: Progressing     Problem: Safety - Adult  Goal: Free from fall injury  Outcome: Progressing     Problem: ABCDS Injury Assessment  Goal: Absence of physical injury  Outcome: Progressing     Problem: Safety - Medical Restraint  Goal: Remains free of injury from restraints (Restraint for Interference with Medical Device)  3/14/2023 0611 by Enedina Tracy RN  Outcome: Progressing  Flowsheets  Taken 3/14/2023 0600  Remains free of injury from restraints (restraint for interference with medical device): Every 2 hours: Monitor safety, psychosocial status, comfort, nutrition and hydration  Taken 3/14/2023 0400  Remains free of injury from restraints (restraint for interference with medical device): Every 2 hours: Monitor safety, psychosocial status, comfort, nutrition and hydration  Taken 3/14/2023 0200  Remains free of injury from restraints (restraint for interference with medical device): Every 2 hours: Monitor safety, psychosocial status, comfort, nutrition and hydration  Taken 3/14/2023 0000  Remains free of injury from restraints (restraint for interference with medical device): Every 2 hours: Monitor safety, psychosocial status, comfort, nutrition and hydration  Taken 3/13/2023 2200  Remains free of injury from restraints (restraint for interference with medical device): Every 2 hours: Monitor safety, psychosocial status, comfort, nutrition and hydration  Taken 3/13/2023 2000  Remains free of injury from restraints (restraint for interference with medical device): Every 2 hours: Monitor safety, psychosocial status, comfort, nutrition and hydration  3/13/2023 1905 by Dereje Salinas RN  Outcome: Progressing  Flowsheets  Taken 3/13/2023 1800  Remains free of injury from restraints (restraint for

## 2023-03-14 NOTE — PROGRESS NOTES
When restrains removed pt reaching for the IV tubing and monitor wires. ROM performed, nos/s of injury, restraints reapplied for pt's safety. Will continue to monitor.

## 2023-03-14 NOTE — PROGRESS NOTES
[facility]   ATTENDING PHYSICIAN PROGRESS NOTE     I have examined the patient, reviewed the record, and discussed the case with the APN/ Resident. I have reviewed all relevant labs and imaging data. Please refer to the APN/ resident's note. I agree with the assessment and plan with the following corrections/ additions. The following summarizes my clinical findings and independent assessment. CC: SBO    Patient reports some pain in his abdomen. Seems confused. 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 Every effort was made to ensure accuracy; however, inadvertent computerized transcription errors may be present.

## 2023-03-14 NOTE — PROGRESS NOTES
Supervision  Dynamic Standing:  SBA with AAD     Pt is A & O x 4 (self, \"hospital,\" time, situation)  Sensation:  Pt reports intermittent B hand numbness/tingling  Edema:  Unremarkable    Vitals:   SpO2 87% (room air) sitting EOB. -109, SpO2 92% (2 L O2) sitting EOB. HR 96, SpO2 93% (2 L O2), /95 in semi-Peter's position following activity. Nurse notified of BP. Patient education  Pt educated on role of PT, abdominal precautions, safety during functional mobility. Patient response to education:   Pt verbalized understanding Pt demonstrated skill Pt requires further education in this area   Yes Yes Yes     ASSESSMENT:    Conditions Requiring Skilled Therapeutic Intervention:    [x]Decreased strength     []Decreased ROM  [x]Decreased functional mobility  [x]Decreased balance   [x]Decreased endurance   [x]Decreased posture  [x]Decreased sensation  []Decreased coordination   []Decreased vision  [x]Decreased safety awareness   [x]Increased pain       Comments:  Session cleared by nurse. Patient in semi-Peter's position with nasal cannula doffed upon arrival; agreeable to PT session with OT collaboration. Restraints removed at beginning of session and placed at end of session. Required increased time, assistance of trunk and BLE to perform bed mobility via log roll technique. Exhibited significant posterior lean upon initially sitting EOB. Nurse present during session and aware of decreased SpO2 and application of nasal cannula. Tolerated sitting EOB for extended period of time while interdisciplinary care was provided. Activity limited by fatigue. Reported persistent lightheadedness during activity that appeared to worsen over time; reported feeling weak while sitting EOB and exhibited significant rightward lean; further functional mobility withheld due to safety concerns; patient assisted to supine; nurse notified. Patient reported improvement of lightheadedness in supine.  Patient left in for safe discharge home and/or into the community   [x] Positioning to prevent skin breakdown and contractures  [x] Safety and Education Training   [x] Patient/Caregiver Education   [] HEP  [] Other     PT long term treatment goals are located in above grid    Frequency of treatments: 2-5x/week x 1-2 weeks. Time in  1140  Time out  1225    Total Treatment Time  30 minutes     Evaluation Time includes thorough review of current medical information, gathering information on past medical history/social history and prior level of function, completion of standardized testing/informal observation of tasks, assessment of data and education on plan of care and goals.     CPT codes:  [x] Low Complexity PT evaluation 34184  [] Moderate Complexity PT evaluation 74664  [] High Complexity PT evaluation 77021  [] PT Re-evaluation 65386  [] Gait training 75711 0 minutes  [] Manual therapy 27701 0 minutes  [x] Therapeutic activities 14620 30 minutes  [] Therapeutic exercises 38103 0 minutes  [] Neuromuscular reeducation 81937 0 minutes     Josephine Patricia, PT, DPT  [id number]

## 2023-03-14 NOTE — PROGRESS NOTES
Hospitalist Progress Note      SYNOPSIS: Patient admitted on 3/10/2023 for SBO (small bowel obstruction) ([facility] [address]      SUBJECTIVE:    Patient seen and examined. Patient was curious of her eating. Discussed with him given his ileus is no way he can keep any food down. By bedside notes he did pull out his NG twice. Like some ice chips discussed with him that we will be okay for now but will need to continue bowel rest.  Records reviewed. 65-year-old male past medical history of cervical myopathy, cervical spinal stenosis, prostate cancer presented to small bowel obstruction and acute kidney injury. He was seen by general surgery started on IV fluids for acute kidney injury. His status post exploratory laparotomy and reduction of internal hernia on 3/12/2023 developed postoperative ileus. Stable overnight. No other overnight issues reported. Temp (24hrs), Av °F (37.2 °C), Min:98.4 °F (36.9 °C), Max:99.5 °F (37.5 °C)    DIET: Diet NPO Exceptions are: Sips of Water with Meds  CODE: Full Code    Intake/Output Summary (Last 24 hours) at 3/14/2023 1101  Last data filed at 3/14/2023 0528  Gross per 24 hour   Intake 2200 ml   Output 1000 ml   Net 1200 ml       OBJECTIVE:    BP (!) 139/93   Pulse 80   Temp 98.4 °F (36.9 °C) (Temporal)   Resp 16   Ht 5' 8\" (1.727 m)   Wt 126 lb 6.4 oz (57.3 kg)   SpO2 98%   BMI 19.22 kg/m²     General appearance: No apparent distress, appears stated age and cooperative. HEENT:  Conjunctivae/corneas clear. Neck: Supple. No jugular venous distention. Respiratory: Clear to auscultation bilaterally, normal respiratory effort  Cardiovascular: Regular rate rhythm, normal S1-S2  Abdomen: Soft, nontender, nondistended, sutures clean no surrounding erythema or drainage around surgical wound  Musculoskeletal: No clubbing, cyanosis, no bilateral lower extremity edema. Brisk capillary refill.    Skin:  No rashes  on visible skin  Neurologic: awake, alert and following commands software. Every effort was made to ensure accuracy; however, inadvertent computerized transcription errors may be present.

## 2023-03-14 NOTE — PROGRESS NOTES
When restraints removed pt is reaching for IV tubing, stevens catheter and monitor wires. ROM performed, no s/s of injury. Restraints reapplied for pt's safety. Will continue to monitor.

## 2023-03-15 ENCOUNTER — APPOINTMENT (OUTPATIENT)
Dept: CT IMAGING | Age: 68
DRG: 356 | End: 2023-03-15
Payer: OTHER GOVERNMENT

## 2023-03-15 PROBLEM — E43 SEVERE PROTEIN-CALORIE MALNUTRITION (HCC): Chronic | Status: ACTIVE | Noted: 2023-03-15

## 2023-03-15 LAB
ANION GAP SERPL CALCULATED.3IONS-SCNC: 5 MMOL/L (ref 7–16)
BASOPHILS ABSOLUTE: 0.01 E9/L (ref 0–0.2)
BASOPHILS RELATIVE PERCENT: 0.3 % (ref 0–2)
BUN BLDV-MCNC: 20 MG/DL (ref 6–23)
BURR CELLS: ABNORMAL
CALCIUM SERPL-MCNC: 7.8 MG/DL (ref 8.6–10.2)
CHLORIDE BLD-SCNC: 116 MMOL/L (ref 98–107)
CO2: 28 MMOL/L (ref 22–29)
CREAT SERPL-MCNC: 0.8 MG/DL (ref 0.7–1.2)
EOSINOPHILS ABSOLUTE: 0.15 E9/L (ref 0.05–0.5)
EOSINOPHILS RELATIVE PERCENT: 3.9 % (ref 0–6)
GFR SERPL CREATININE-BSD FRML MDRD: >60 ML/MIN/1.73
GLUCOSE BLD-MCNC: 115 MG/DL (ref 74–99)
HCT VFR BLD CALC: 38.2 % (ref 37–54)
HEMOGLOBIN: 11.6 G/DL (ref 12.5–16.5)
IMMATURE GRANULOCYTES #: 0.05 E9/L
IMMATURE GRANULOCYTES %: 1.3 % (ref 0–5)
LYMPHOCYTES ABSOLUTE: 0.6 E9/L (ref 1.5–4)
LYMPHOCYTES RELATIVE PERCENT: 15.7 % (ref 20–42)
MCH RBC QN AUTO: 26.9 PG (ref 26–35)
MCHC RBC AUTO-ENTMCNC: 30.4 % (ref 32–34.5)
MCV RBC AUTO: 88.6 FL (ref 80–99.9)
MONOCYTES ABSOLUTE: 0.56 E9/L (ref 0.1–0.95)
MONOCYTES RELATIVE PERCENT: 14.7 % (ref 2–12)
NEUTROPHILS ABSOLUTE: 2.44 E9/L (ref 1.8–7.3)
NEUTROPHILS RELATIVE PERCENT: 64.1 % (ref 43–80)
OVALOCYTES: ABNORMAL
PDW BLD-RTO: 17.2 FL (ref 11.5–15)
PLATELET # BLD: 282 E9/L (ref 130–450)
PMV BLD AUTO: 9.3 FL (ref 7–12)
POIKILOCYTES: ABNORMAL
POLYCHROMASIA: ABNORMAL
POTASSIUM REFLEX MAGNESIUM: 3.9 MMOL/L (ref 3.5–5)
RBC # BLD: 4.31 E12/L (ref 3.8–5.8)
SCHISTOCYTES: ABNORMAL
SODIUM BLD-SCNC: 149 MMOL/L (ref 132–146)
WBC # BLD: 3.8 E9/L (ref 4.5–11.5)

## 2023-03-15 PROCEDURE — 36415 COLL VENOUS BLD VENIPUNCTURE: CPT

## 2023-03-15 PROCEDURE — 97535 SELF CARE MNGMENT TRAINING: CPT

## 2023-03-15 PROCEDURE — 2580000003 HC RX 258: Performed by: STUDENT IN AN ORGANIZED HEALTH CARE EDUCATION/TRAINING PROGRAM

## 2023-03-15 PROCEDURE — 6360000002 HC RX W HCPCS: Performed by: STUDENT IN AN ORGANIZED HEALTH CARE EDUCATION/TRAINING PROGRAM

## 2023-03-15 PROCEDURE — 6370000000 HC RX 637 (ALT 250 FOR IP): Performed by: STUDENT IN AN ORGANIZED HEALTH CARE EDUCATION/TRAINING PROGRAM

## 2023-03-15 PROCEDURE — 2060000000 HC ICU INTERMEDIATE R&B

## 2023-03-15 PROCEDURE — 6360000004 HC RX CONTRAST MEDICATION: Performed by: RADIOLOGY

## 2023-03-15 PROCEDURE — 99024 POSTOP FOLLOW-UP VISIT: CPT | Performed by: SURGERY

## 2023-03-15 PROCEDURE — 2580000003 HC RX 258: Performed by: FAMILY MEDICINE

## 2023-03-15 PROCEDURE — 80048 BASIC METABOLIC PNL TOTAL CA: CPT

## 2023-03-15 PROCEDURE — 85025 COMPLETE CBC W/AUTO DIFF WBC: CPT

## 2023-03-15 PROCEDURE — 74177 CT ABD & PELVIS W/CONTRAST: CPT

## 2023-03-15 PROCEDURE — 2700000000 HC OXYGEN THERAPY PER DAY

## 2023-03-15 RX ORDER — DEXTROSE MONOHYDRATE 50 MG/ML
INJECTION, SOLUTION INTRAVENOUS CONTINUOUS
Status: DISCONTINUED | OUTPATIENT
Start: 2023-03-15 | End: 2023-03-16

## 2023-03-15 RX ADMIN — ACETAMINOPHEN 650 MG: 325 TABLET ORAL at 10:44

## 2023-03-15 RX ADMIN — HYDROMORPHONE HYDROCHLORIDE 0.5 MG: 1 INJECTION, SOLUTION INTRAMUSCULAR; INTRAVENOUS; SUBCUTANEOUS at 17:15

## 2023-03-15 RX ADMIN — SODIUM CHLORIDE, PRESERVATIVE FREE 10 ML: 5 INJECTION INTRAVENOUS at 13:38

## 2023-03-15 RX ADMIN — ENOXAPARIN SODIUM 40 MG: 100 INJECTION SUBCUTANEOUS at 10:42

## 2023-03-15 RX ADMIN — ACETAMINOPHEN 650 MG: 325 TABLET ORAL at 05:22

## 2023-03-15 RX ADMIN — METHOCARBAMOL 1500 MG: 750 TABLET ORAL at 20:29

## 2023-03-15 RX ADMIN — HYDROMORPHONE HYDROCHLORIDE 0.5 MG: 1 INJECTION, SOLUTION INTRAMUSCULAR; INTRAVENOUS; SUBCUTANEOUS at 13:38

## 2023-03-15 RX ADMIN — HYDROMORPHONE HYDROCHLORIDE 0.5 MG: 1 INJECTION, SOLUTION INTRAMUSCULAR; INTRAVENOUS; SUBCUTANEOUS at 00:39

## 2023-03-15 RX ADMIN — HYDROMORPHONE HYDROCHLORIDE 0.5 MG: 1 INJECTION, SOLUTION INTRAMUSCULAR; INTRAVENOUS; SUBCUTANEOUS at 05:21

## 2023-03-15 RX ADMIN — IOPAMIDOL 75 ML: 755 INJECTION, SOLUTION INTRAVENOUS at 19:15

## 2023-03-15 RX ADMIN — SODIUM CHLORIDE, PRESERVATIVE FREE 10 ML: 5 INJECTION INTRAVENOUS at 20:30

## 2023-03-15 RX ADMIN — ACETAMINOPHEN 650 MG: 325 TABLET ORAL at 20:28

## 2023-03-15 RX ADMIN — DEXTROSE MONOHYDRATE: 50 INJECTION, SOLUTION INTRAVENOUS at 10:53

## 2023-03-15 ASSESSMENT — PAIN DESCRIPTION - ORIENTATION
ORIENTATION: MID

## 2023-03-15 ASSESSMENT — PAIN DESCRIPTION - DESCRIPTORS
DESCRIPTORS: SHOOTING;SORE;STABBING
DESCRIPTORS: DISCOMFORT;STABBING
DESCRIPTORS: SHOOTING;SORE;STABBING
DESCRIPTORS: SHARP;SORE;TENDER
DESCRIPTORS: DISCOMFORT;SORE;TENDER
DESCRIPTORS: SHOOTING;SPASM;SORE

## 2023-03-15 ASSESSMENT — PAIN - FUNCTIONAL ASSESSMENT
PAIN_FUNCTIONAL_ASSESSMENT: PREVENTS OR INTERFERES SOME ACTIVE ACTIVITIES AND ADLS
PAIN_FUNCTIONAL_ASSESSMENT: PREVENTS OR INTERFERES SOME ACTIVE ACTIVITIES AND ADLS

## 2023-03-15 ASSESSMENT — PAIN DESCRIPTION - ONSET
ONSET: ON-GOING
ONSET: ON-GOING

## 2023-03-15 ASSESSMENT — PAIN DESCRIPTION - LOCATION
LOCATION: ABDOMEN

## 2023-03-15 ASSESSMENT — PAIN SCALES - GENERAL
PAINLEVEL_OUTOF10: 4
PAINLEVEL_OUTOF10: 10
PAINLEVEL_OUTOF10: 4
PAINLEVEL_OUTOF10: 10
PAINLEVEL_OUTOF10: 0
PAINLEVEL_OUTOF10: 8
PAINLEVEL_OUTOF10: 8

## 2023-03-15 ASSESSMENT — PAIN DESCRIPTION - FREQUENCY
FREQUENCY: CONTINUOUS
FREQUENCY: CONTINUOUS

## 2023-03-15 ASSESSMENT — PAIN DESCRIPTION - PAIN TYPE
TYPE: ACUTE PAIN;SURGICAL PAIN
TYPE: ACUTE PAIN;SURGICAL PAIN

## 2023-03-15 NOTE — PROGRESS NOTES
When restraints removed the pt is reaching for the IV tubing. ROM performed, no s/s of injury. Restraints reapplied for pt's safety. Will continue to monitor.

## 2023-03-15 NOTE — PROGRESS NOTES
[address]      Date:3/15/2023                                                  Patient Name: Isabel Patton  MRN: [id number]  : 1955  Room: [address] [place] [address]    Evaluating OT: ELAINE Arias, OTR/L  # [id number]    Referring Provider:  Beti Gross DO  Specific Provider Orders:  Celi Beronnae and Treat\"  3-13-23    Diagnosis: SBO (small bowel obstruction) ([facility] [address] [K56.609]  NURIA (acute kidney injury) ([facility] [address] [N17.9]  Acute cystitis with hematuria [N30.01]    Pt was admitted w/ Abdominal pain/distention, emesis, no BM    Pertinent Medical History:  Pt has a past medical history of Cancer ([facility] [address] ,  has a past surgical history that includes Skin graft; Hemorrhoid surgery; cervical fusion (N/A, 2022); cervical fusion (N/A, 2022); Throat surgery (N/A, 2022); Stomach surgery (N/A, 2022); back surgery; Abdomen surgery; and laparotomy (N/A, 3/12/2023).     Surgeries this admission: 3-12-23: Exploratory Laparotomy, Reduction of Internal Hernia    Precautions:  Fall Risk  Abdominal Precautions  External catheter  Cognition - Blu Wrist Restraints, bed alarm       Assessment of current deficits   [x] Functional mobility  [x]ADLs  [x] Strength               [x]Cognition   [x] Functional transfers   [x] IADLs         [x] Safety Awareness   [x]Endurance   [x] Fine Coordination              [x] Balance     [] Vision/perception   [x]Sensation    []Gross Motor Coordination  [x] ROM  [] Delirium                  [] Motor Control       OT PLAN OF CARE   OT POC based on physician orders, patient diagnosis and results of clinical assessment    Frequency/Duration 1-3 days/wk for 2 weeks PRN   Specific OT Treatment to include:     * Instruction/training on adapted ADL techniques and AE recommendations to increase functional independence within precautions       * Training on energy conservation strategies, correct breathing pattern and techniques to improve independence/tolerance for self-care routine  * Functional transfer/mobility training/DME recommendations for increased independence, safety, and fall prevention  * Patient/Family education to increase follow through with safety techniques and functional independence  * Recommendation of environmental modifications for increased safety with functional transfers/mobility and ADLs  * Cognitive retraining/development of therapeutic activities to improve problem solving, judgement, memory, and attention for increased safety/participation in ADL/IADL tasks  * Therapeutic exercise to improve motor endurance, ROM, and functional strength for ADLs/functional transfers  * Therapeutic activities to facilitate/challenge dynamic balance, stand tolerance for increased safety and independence with ADLs  * Therapeutic activities to facilitate gross/fine motor skills for increased independence with ADLs  * Neuro-muscular re-education: facilitation of righting/equilibrium reactions  * Positioning to improve skin integrity, interaction with environment and functional independence  * Delirium prevention/treatment  * Manual techniques for edema management  Other:    Recommended Adaptive Equipment: TBD as pt progresses - BSC, adaptive, equipment      Home Living:  Pt lives with his sister in a 2-story house. Bedroom and half bathroom on the main floor, full bathroom on the 2nd floor. (?) Basement. Bathroom setup:  T[employer]ower, [address] [place] [address] owned:  [employer], [employer], Shower chair    Available Family Assist:  Sister able to provide assist PRN    Prior Level of Function:  Pt reported receiving assist w/ ADLs, SUP/Assist for Transfers and Mobility using Rollator for ambulation.  Sister completes all IADLs  Driving:  No  Occupation:  \"\"    Pain Level:  9/10 Abdominal Pain;  Relief w/ Rest and Repositioning, Nsg Notified

## 2023-03-15 NOTE — PROGRESS NOTES
Rn was assigned this patient at 1600 upon assessment, patient reported complaints of severe abdominal pain rated 9/10 on the pain scale and unable to pass gas or have a bowel movement. Rn noted pt abdomen to be hard and distended when palpating, tender to touch, bowel sounds hypoactive, pt observed breathing shallow breaths. Vitals are within normal limits. Notified attending and gen surg of findings and recs to do ct to see if pt still has bowel obstruction, no nausea or vomiting that was reported during report or from patient. New orders received. Pt will remain NPO, until further testing is done. [id number]- Rn administered . 5 iv dilaudid for abdominal discomfort. Will continue to monitor.

## 2023-03-15 NOTE — PROGRESS NOTES
[employer] APPROVAL #[id number]  3/31/22-12/31/2039.  See approval letter scanned to media.   GENERAL SURGERY  DAILY PROGRESS NOTE  3/15/2023  Chief Complaint   Patient presents with    Bloated     Distended abd, emesis, no bm for 1 wk       Subjective:  Started passing gas and had bowel movement overnight. Denies nausea or emesis. Now taking some pain medication and is feeling better    Objective:  BP (!) 125/97   Pulse 89   Temp 98.2 °F (36.8 °C) (Temporal)   Resp 16   Ht 5' 8\" (1.727 m)   Wt 126 lb 6.4 oz (57.3 kg)   SpO2 99%   BMI 19.22 kg/m²     GENERAL:  Laying in bed, awake, alert, cooperative, no apparent distress  HEAD: Normocephalic, atraumatic  EYES: No sclera icterus, pupils equal  LUNGS:  No increased work of breathing on 2 L NC  CARDIOVASCULAR:  RR  ABDOMEN:  Soft, appropriately tender, non-distended.  Incision C/D/I  EXTREMITIES: No edema or swelling  SKIN: Warm and dry    Assessment/Plan:  75 y.o. male with internal hernia s/p ex lap reduction of internal hernia 3/12    - Advance to CLD  - Pain/nausea control prn  - OOB ambulate  - Cr now normal    Plan to be dicussed with Dr. Shira Faustin      Electronically signed by Gil Chen MD on 3/15/2023 at 7:22 AM

## 2023-03-15 NOTE — PLAN OF CARE
Problem: Safety - Medical Restraint  Goal: Remains free of injury from restraints (Restraint for Interference with Medical Device)  3/15/2023 0538 by Miriam Ojeda RN  Outcome: Completed  3/15/2023 0211 by Miriam Ojeda RN  Outcome: Progressing  Flowsheets  Taken 3/15/2023 0000 by Miriam Ojeda RN  Remains free of injury from restraints (restraint for interference with medical device): Every 2 hours: Monitor safety, psychosocial status, comfort, nutrition and hydration  Taken 3/14/2023 2200 by Miriam Ojeda RN  Remains free of injury from restraints (restraint for interference with medical device): Every 2 hours: Monitor safety, psychosocial status, comfort, nutrition and hydration  Taken 3/14/2023 2000 by Miriam Ojeda RN  Remains free of injury from restraints (restraint for interference with medical device): Every 2 hours: Monitor safety, psychosocial status, comfort, nutrition and hydration  Taken 3/14/2023 1800 by Miriam Ojeda RN  Remains free of injury from restraints (restraint for interference with medical device): Every 2 hours: Monitor safety, psychosocial status, comfort, nutrition and hydration  Taken 3/14/2023 1600 by Miriam Ojeda RN  Remains free of injury from restraints (restraint for interference with medical device): Every 2 hours: Monitor safety, psychosocial status, comfort, nutrition and hydration  Taken 3/14/2023 1400 by Dmitry Nicole RN  Remains free of injury from restraints (restraint for interference with medical device): Every 2 hours: Monitor safety, psychosocial status, comfort, nutrition and hydration

## 2023-03-15 NOTE — PROGRESS NOTES
Comprehensive Nutrition Assessment    Type and Reason for Visit:  Initial, NPO/Clear Liquid    Nutrition Recommendations/Plan:   Continue Diet and ADAT. Will Start ONS and monitor. If unable to tolerate PO diet x next few days, would then rec for PN support. Please consult for rec's if PN needed. Malnutrition Assessment:  Malnutrition Status:  Severe malnutrition (03/15/23 1522)    Context:  Chronic Illness     Findings of the 6 clinical characteristics of malnutrition:  Energy Intake:  75% or less estimated energy requirements for 1 month or longer  Weight Loss:  No significant weight loss     Body Fat Loss:  Severe body fat loss Orbital, Triceps   Muscle Mass Loss:  Severe muscle mass loss Temples (temporalis), Clavicles (pectoralis & deltoids), Scapula (trapezius)  Fluid Accumulation:  No significant fluid accumulation     Strength:  Not Performed    Nutrition Assessment:    Pt adm w/ no BM x ~1wk, N/V/Abd pain x ~2-3d pta. PMHx Prostate CA (13'), Spinal Stenosis, Severe PCM (7/2022); Adm w/ SBO, NURIA, s/p Ex Lap w/ Reduction of Hernia 3/12.  +Post-op Ileus, s/p NG 3/11, self-removed and replaced x 3, however now w/ +BM last PM, adv to clears, tolerating thus far. Pt at nutritional risk d/t currently meeting criteria for Severe Malnutrition w/ noted wasting and poor intake 2/2 chronic poor intake w/ altered GI. Will Start ONS and monitor. Nutrition Related Findings:    A&O, confused at times, dentition WNL, tender/distended Abd, +active/distant BS, no edema, +I/O's, +hyperNA+ Wound Type: Surgical Incision       Current Nutrition Intake & Therapies:    Average Meal Intake: 1-25%  Average Supplements Intake: None Ordered  ADULT DIET; Clear Liquid    Anthropometric Measures:  Height: 5' 8\" (172.7 cm)  Ideal Body Weight (IBW): 154 lbs (70 kg)    Admission Body Weight: 126 lb (57.2 kg) (bed 3/10)  Current Body Weight: 126 lb (57.2 kg) (bed 3/10),   IBW.  Weight Source: Bed Scale  Current BMI

## 2023-03-15 NOTE — CARE COORDINATION
Spoke with [facility], they cannot accept. Discussed with patient and he asked me to call Tye Ramirez on speaker phone while we spoke. He does not want to go to [facility]. I asked him about other options. He wants to review list with sister when she comes in later today. Discussed that he should have top three choices. Tye Ramirez tells me she will likely be here before 4pm to discuss. Will follow up with choices. NGT removed yesterday evening, on clear liquid diet today. Na today 149. For questions I can be reached at [phone].  [place], [place]

## 2023-03-15 NOTE — PLAN OF CARE
Problem: Discharge Planning  Goal: Discharge to home or other facility with appropriate resources  Outcome: Progressing     Problem: Pain  Goal: Verbalizes/displays adequate comfort level or baseline comfort level  Outcome: Progressing     Problem: Safety - Adult  Goal: Free from fall injury  Outcome: Progressing     Problem: Skin/Tissue Integrity  Goal: Absence of new skin breakdown  Outcome: Progressing     Problem: ABCDS Injury Assessment  Goal: Absence of physical injury  Outcome: Progressing     Problem: Safety - Medical Restraint  Goal: Remains free of injury from restraints (Restraint for Interference with Medical Device)  Outcome: Progressing  Flowsheets  Taken 3/15/2023 0000 by Thao Whitlock RN  Remains free of injury from restraints (restraint for interference with medical device): Every 2 hours: Monitor safety, psychosocial status, comfort, nutrition and hydration  Taken 3/14/2023 2200 by Thao Whitlock RN  Remains free of injury from restraints (restraint for interference with medical device): Every 2 hours: Monitor safety, psychosocial status, comfort, nutrition and hydration  Taken 3/14/2023 2000 by Thao Whitlock RN  Remains free of injury from restraints (restraint for interference with medical device): Every 2 hours: Monitor safety, psychosocial status, comfort, nutrition and hydration  Taken 3/14/2023 1800 by Thao Whitlock RN  Remains free of injury from restraints (restraint for interference with medical device): Every 2 hours: Monitor safety, psychosocial status, comfort, nutrition and hydration  Taken 3/14/2023 1600 by Thao Whitlock RN  Remains free of injury from restraints (restraint for interference with medical device): Every 2 hours: Monitor safety, psychosocial status, comfort, nutrition and hydration  Taken 3/14/2023 1400 by Lynne Kaiser RN  Remains free of injury from restraints (restraint for interference with medical device): Every 2 hours: Monitor safety, psychosocial status, comfort, nutrition and hydration

## 2023-03-15 NOTE — PROGRESS NOTES
Hospitalist Progress Note      SYNOPSIS: Patient admitted on 3/10/2023 for SBO (small bowel obstruction) ([facility] [address]      SUBJECTIVE:    Patient seen and examined. Patient mentioned that he is passing gas today tolerating clear liquid diet. Records reviewed. 57-year-old male past medical history of cervical myopathy, cervical spinal stenosis, prostate cancer presented to small bowel obstruction and acute kidney injury. He was seen by general surgery started on IV fluids for acute kidney injury. His status post exploratory laparotomy and reduction of internal hernia on 3/12/2023 developed postoperative ileus. Stable overnight. No other overnight issues reported. Temp (24hrs), Av °F (37.2 °C), Min:98.2 °F (36.8 °C), Max:99.7 °F (37.6 °C)    DIET: ADULT DIET; Clear Liquid  CODE: Full Code    Intake/Output Summary (Last 24 hours) at 3/15/2023 0941  Last data filed at 3/15/2023 0524  Gross per 24 hour   Intake 2770 ml   Output 800 ml   Net 1970 ml       OBJECTIVE:    BP (!) 125/97   Pulse 89   Temp 98.2 °F (36.8 °C) (Temporal)   Resp 16   Ht 5' 8\" (1.727 m)   Wt 126 lb 6.4 oz (57.3 kg)   SpO2 99%   BMI 19.22 kg/m²     General appearance: No apparent distress, appears stated age and cooperative. HEENT:  Conjunctivae/corneas clear. Neck: Supple. No jugular venous distention. Respiratory: Clear to auscultation bilaterally, normal respiratory effort  Cardiovascular: Regular rate rhythm, normal S1-S2  Abdomen: Soft, nontender, nondistended  Musculoskeletal: No clubbing, cyanosis, no bilateral lower extremity edema. Brisk capillary refill. Skin:  No rashes  on visible skin  Neurologic: awake, alert and following commands     ASSESSMENT:  Small obstruction status post expiratory laparotomy and reduction of internal hernia 3/12/23  Postoperative ileus  Acute kidney injury   Hypokalemia   Hypernatremia   history of prostate cancer        PLAN:  Advanced to clear liquid diet today per general surgery. Sodium 149, will switch to D5W. Monitor bowel activity. Pain control. DISPOSITION:     Medications:  REVIEWED DAILY    Infusion Medications    dextrose 5 % and 0.45 % NaCl 100 mL/hr at 03/14/23 1545    sodium chloride       Scheduled Medications    acetaminophen  650 mg Oral Q6H    methocarbamol  1,500 mg Oral 4x Daily    sodium chloride flush  10 mL IntraVENous 2 times per day    enoxaparin  40 mg SubCUTAneous Daily     PRN Meds: HYDROmorphone **OR** HYDROmorphone, sodium chloride flush, sodium chloride, promethazine **OR** ondansetron, polyethylene glycol    Labs:     Recent Labs     03/14/23  0459 03/15/23  0508   WBC 3.5* 3.8*   HGB 12.8 11.6*   HCT 40.5 38.2    282       Recent Labs     03/13/23  0511 03/14/23  0459 03/15/23  0508   * 148* 149*   K 3.8 3.3*  3.3* 3.9   * 111* 116*   CO2 29 29 28   BUN 46* 27* 20   CREATININE 1.2 0.9 0.8   CALCIUM 7.8* 8.3* 7.8*       No results for input(s): PROT, ALB, ALKPHOS, ALT, AST, BILITOT, AMYLASE, LIPASE in the last 72 hours. No results for input(s): INR in the last 72 hours. No results for input(s): Vick Vivas in the last 72 hours. Chronic labs:    Lab Results   Component Value Date    INR 1.1 07/13/2022       Radiology: REVIEWED DAILY    +++++++++++++++++++++++++++++++++++++++++++++++++  Loreen Kussmaul, MD  [facility] Physician [address], [facility]  +++++++++++++++++++++++++++++++++++++++++++++++++  NOTE: This report was transcribed using voice recognition software. Every effort was made to ensure accuracy; however, inadvertent computerized transcription errors may be present.

## 2023-03-15 NOTE — PROGRESS NOTES
[facility]   ATTENDING PHYSICIAN PROGRESS NOTE     I have examined the patient, reviewed the record, and discussed the case with the APN/ Resident. I have reviewed all relevant labs and imaging data. Please refer to the APN/ resident's note. I agree with the assessment and plan with the following corrections/ additions. The following summarizes my clinical findings and independent assessment. CC: SBO    Patient more alert today. Reports passing some flatus and had BM. 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 Every effort was made to ensure accuracy; however, inadvertent computerized transcription errors may be present.

## 2023-03-16 LAB
ANION GAP SERPL CALCULATED.3IONS-SCNC: 7 MMOL/L (ref 7–16)
ANISOCYTOSIS: ABNORMAL
BASOPHILS # BLD: 0 E9/L (ref 0–0.2)
BASOPHILS NFR BLD: 0.2 % (ref 0–2)
BUN SERPL-MCNC: 17 MG/DL (ref 6–23)
CALCIUM SERPL-MCNC: 7.6 MG/DL (ref 8.6–10.2)
CHLORIDE SERPL-SCNC: 109 MMOL/L (ref 98–107)
CO2 SERPL-SCNC: 27 MMOL/L (ref 22–29)
CREAT SERPL-MCNC: 0.7 MG/DL (ref 0.7–1.2)
EOSINOPHIL # BLD: 0.2 E9/L (ref 0.05–0.5)
EOSINOPHIL NFR BLD: 3.5 % (ref 0–6)
ERYTHROCYTE [DISTWIDTH] IN BLOOD BY AUTOMATED COUNT: 16.9 FL (ref 11.5–15)
GLUCOSE SERPL-MCNC: 97 MG/DL (ref 74–99)
HCT VFR BLD AUTO: 37.9 % (ref 37–54)
HGB BLD-MCNC: 11.5 G/DL (ref 12.5–16.5)
LYMPHOCYTES # BLD: 0.58 E9/L (ref 1.5–4)
LYMPHOCYTES NFR BLD: 10.4 % (ref 20–42)
MAGNESIUM SERPL-MCNC: 2.1 MG/DL (ref 1.6–2.6)
MCH RBC QN AUTO: 26.9 PG (ref 26–35)
MCHC RBC AUTO-ENTMCNC: 30.3 % (ref 32–34.5)
MCV RBC AUTO: 88.6 FL (ref 80–99.9)
MONOCYTES # BLD: 0.29 E9/L (ref 0.1–0.95)
MONOCYTES NFR BLD: 5.2 % (ref 2–12)
NEUTROPHILS # BLD: 4.7 E9/L (ref 1.8–7.3)
NEUTS SEG NFR BLD: 80.9 % (ref 43–80)
OVALOCYTES: ABNORMAL
PLATELET # BLD AUTO: 289 E9/L (ref 130–450)
PMV BLD AUTO: 9.6 FL (ref 7–12)
POIKILOCYTES: ABNORMAL
POTASSIUM SERPL-SCNC: 3.5 MMOL/L (ref 3.5–5)
RBC # BLD AUTO: 4.28 E12/L (ref 3.8–5.8)
SODIUM SERPL-SCNC: 143 MMOL/L (ref 132–146)
WBC # BLD: 5.8 E9/L (ref 4.5–11.5)

## 2023-03-16 PROCEDURE — 92610 EVALUATE SWALLOWING FUNCTION: CPT

## 2023-03-16 PROCEDURE — 6360000002 HC RX W HCPCS: Performed by: STUDENT IN AN ORGANIZED HEALTH CARE EDUCATION/TRAINING PROGRAM

## 2023-03-16 PROCEDURE — 92526 ORAL FUNCTION THERAPY: CPT

## 2023-03-16 PROCEDURE — 85025 COMPLETE CBC W/AUTO DIFF WBC: CPT

## 2023-03-16 PROCEDURE — 97530 THERAPEUTIC ACTIVITIES: CPT

## 2023-03-16 PROCEDURE — 2700000000 HC OXYGEN THERAPY PER DAY

## 2023-03-16 PROCEDURE — 2580000003 HC RX 258: Performed by: STUDENT IN AN ORGANIZED HEALTH CARE EDUCATION/TRAINING PROGRAM

## 2023-03-16 PROCEDURE — 97535 SELF CARE MNGMENT TRAINING: CPT

## 2023-03-16 PROCEDURE — 83735 ASSAY OF MAGNESIUM: CPT

## 2023-03-16 PROCEDURE — 6370000000 HC RX 637 (ALT 250 FOR IP): Performed by: STUDENT IN AN ORGANIZED HEALTH CARE EDUCATION/TRAINING PROGRAM

## 2023-03-16 PROCEDURE — 2060000000 HC ICU INTERMEDIATE R&B

## 2023-03-16 PROCEDURE — 80048 BASIC METABOLIC PNL TOTAL CA: CPT

## 2023-03-16 PROCEDURE — 99024 POSTOP FOLLOW-UP VISIT: CPT | Performed by: SURGERY

## 2023-03-16 PROCEDURE — 2500000003 HC RX 250 WO HCPCS: Performed by: STUDENT IN AN ORGANIZED HEALTH CARE EDUCATION/TRAINING PROGRAM

## 2023-03-16 PROCEDURE — 36415 COLL VENOUS BLD VENIPUNCTURE: CPT

## 2023-03-16 RX ORDER — TAMSULOSIN HYDROCHLORIDE 0.4 MG/1
0.4 CAPSULE ORAL DAILY
Status: DISCONTINUED | OUTPATIENT
Start: 2023-03-16 | End: 2023-03-23 | Stop reason: HOSPADM

## 2023-03-16 RX ORDER — CYCLOBENZAPRINE HCL 5 MG
5 TABLET ORAL 2 TIMES DAILY
Status: DISCONTINUED | OUTPATIENT
Start: 2023-03-16 | End: 2023-03-23 | Stop reason: HOSPADM

## 2023-03-16 RX ORDER — AMLODIPINE BESYLATE 10 MG/1
10 TABLET ORAL DAILY
Status: DISCONTINUED | OUTPATIENT
Start: 2023-03-16 | End: 2023-03-23 | Stop reason: HOSPADM

## 2023-03-16 RX ORDER — HYDRALAZINE HYDROCHLORIDE 20 MG/ML
10 INJECTION INTRAMUSCULAR; INTRAVENOUS EVERY 30 MIN PRN
Status: DISCONTINUED | OUTPATIENT
Start: 2023-03-16 | End: 2023-03-23 | Stop reason: HOSPADM

## 2023-03-16 RX ORDER — DEXTROSE, SODIUM CHLORIDE, AND POTASSIUM CHLORIDE 5; .45; .15 G/100ML; G/100ML; G/100ML
INJECTION INTRAVENOUS CONTINUOUS
Status: DISCONTINUED | OUTPATIENT
Start: 2023-03-16 | End: 2023-03-18

## 2023-03-16 RX ORDER — POLYETHYLENE GLYCOL 3350 17 G/17G
17 POWDER, FOR SOLUTION ORAL DAILY
Status: DISCONTINUED | OUTPATIENT
Start: 2023-03-16 | End: 2023-03-16

## 2023-03-16 RX ORDER — LABETALOL HYDROCHLORIDE 5 MG/ML
10 INJECTION, SOLUTION INTRAVENOUS EVERY 30 MIN PRN
Status: DISCONTINUED | OUTPATIENT
Start: 2023-03-16 | End: 2023-03-23 | Stop reason: HOSPADM

## 2023-03-16 RX ADMIN — ENOXAPARIN SODIUM 40 MG: 100 INJECTION SUBCUTANEOUS at 08:55

## 2023-03-16 RX ADMIN — SODIUM CHLORIDE, PRESERVATIVE FREE 10 ML: 5 INJECTION INTRAVENOUS at 21:27

## 2023-03-16 RX ADMIN — AMLODIPINE BESYLATE 10 MG: 10 TABLET ORAL at 08:54

## 2023-03-16 RX ADMIN — TAMSULOSIN HYDROCHLORIDE 0.4 MG: 0.4 CAPSULE ORAL at 08:54

## 2023-03-16 RX ADMIN — SODIUM CHLORIDE, PRESERVATIVE FREE 10 ML: 5 INJECTION INTRAVENOUS at 08:57

## 2023-03-16 RX ADMIN — HYDROMORPHONE HYDROCHLORIDE 0.5 MG: 1 INJECTION, SOLUTION INTRAMUSCULAR; INTRAVENOUS; SUBCUTANEOUS at 06:29

## 2023-03-16 RX ADMIN — POTASSIUM CHLORIDE, DEXTROSE MONOHYDRATE AND SODIUM CHLORIDE: 150; 5; 450 INJECTION, SOLUTION INTRAVENOUS at 06:31

## 2023-03-16 ASSESSMENT — PAIN DESCRIPTION - DESCRIPTORS: DESCRIPTORS: SHOOTING;SORE;SPASM

## 2023-03-16 ASSESSMENT — PAIN SCALES - GENERAL
PAINLEVEL_OUTOF10: 6
PAINLEVEL_OUTOF10: 0
PAINLEVEL_OUTOF10: 7
PAINLEVEL_OUTOF10: 0

## 2023-03-16 ASSESSMENT — PAIN DESCRIPTION - LOCATION: LOCATION: ABDOMEN

## 2023-03-16 ASSESSMENT — PAIN DESCRIPTION - ORIENTATION: ORIENTATION: MID

## 2023-03-16 NOTE — PROGRESS NOTES
[facility]   ATTENDING PHYSICIAN PROGRESS NOTE     I have examined the patient, reviewed the record, and discussed the case with the APN/ Resident. I have reviewed all relevant labs and imaging data. Please refer to the APN/ resident's note. I agree with the assessment and plan with the following corrections/ additions. The following summarizes my clinical findings and independent assessment. CC: SBO    Patient reports he is passing some flatus and had a BM. Reports some abd pain. Denies nausea--asking from something to eat and drink. 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 Every effort was made to ensure accuracy; however, inadvertent computerized transcription errors may be present.

## 2023-03-16 NOTE — PROGRESS NOTES
Physical Therapy  Physical Therapy Treatment note     Name: Honey   : 1955  MRN: [id number]      Date of Service: 3/16/2023    Evaluating PT:  Clay Sanchez, PT, DPT [id number]    Room #:  9903/2143-H  Diagnosis:  SBO (small bowel obstruction) ([facility] [P84.551]  NURIA (acute kidney injury) ([facility] [N17.9]  Acute cystitis with hematuria [N30.01]  PMHx/PSHx:   has a past medical history of Cancer ([facility] has a past surgical history that includes Skin graft; Hemorrhoid surgery; cervical fusion (N/A, 2022); cervical fusion (N/A, 2022); Throat surgery (N/A, 2022); Stomach surgery (N/A, 2022); back surgery; Abdomen surgery; and laparotomy (N/A, 3/12/2023). Procedure/Surgery:  Exploratory Laparotomy, Reduction of Internal Hernia (3/12/2023)  Precautions:  Falls, cognition, , abdominal precautions, O2, external catheter, incontinent   Equipment Needs:  TBD    SUBJECTIVE:    Pt is a questionable historian. Pt lives with sister in a 2 story home with 4 stair(s) to enter and 0 rail(s). Pt has been staying on 1st floor. Pt ambulated with rollator PTA. OBJECTIVE:   Initial Evaluation  Date: 3/14/2023 Treatment  Date: 3/16/23 Short Term/ Long Term   Goals   AM-PAC 6 Clicks     Was pt agreeable to Eval/treatment? Yes Yes     Does pt have pain?  /10 abdomen Moderate abdominal pain     Bed Mobility  Rolling: Bob  Supine to sit: ModA  Sit to supine: NT  Scooting: NT Rolling: ModA  Supine to sit: NT  Sit to supine: NT  Scooting: NT Rolling: Supervision  Supine to sit: SBA  Sit to supine: SBA  Scooting: Supervision   Transfers Sit to stand: NT  Stand to sit: NT  Stand pivot: NT NT Sit to stand: SBA  Stand to sit: SBA  Stand pivot: SBA with AAD   Ambulation    NT  feet with AAD SBA   Stair negotiation: ascended and descended  NT NT 4 step(s) with 0 rail(s) +AAD SBA   ROM BUE:  See OT note  BLE:  WFL     Strength BUE:  See OT note  RLE:  Knee ext 5/5, ankle dorsiflex 5/5  LLE: Mobility: Verbal cues for proper positioning and sequencing to perform bed mobility to facilitate maximum independence. Rolling R/L x5 each with increased time required for vital monitoring and pt comfort d/t SOB. Supine therapeutic exercise: B heel slides and ankle pumps x10 each   Skillful positioning in bed to protect skin and joint integrity. Pt HOB elevated to facilitate maximum oxygenation. Pt education for:  Abdominal precautions with mobility   Pursed lip breathing   Symptom monitoring  Therapeutic exercise to complete supine in bed to promote mobility and blood flow. Vitals and symptoms monitored during session. SpO2 WNL throughout session. PLAN:    Patient is making good progress towards established goals. Will continue with current POC.       Time in  1425  Time out  1518    Total Treatment Time  53 minutes     CPT codes:  [] Gait training 87710 0 minutes  [] Manual therapy 74237 0 minutes  [x] Therapeutic activities 83483 43 minutes  [] Therapeutic exercises 54334 0 minutes  [] Neuromuscular reeducation 19852 0 minutes    Jero Ignacio PT, DPT  [id number]

## 2023-03-16 NOTE — CARE COORDINATION
Spoke with patient he asked me to call his sister, Cristina Johnson and discuss things with her. Seems more confused today than yesterday. Called and spoke with sister, she asked for referral to [address] [place] [address]. Referral made and patient accepted. SLP eval today recommended npo until MBSS completed. MBSS ordered and pending. Patient had large BM yesterday evening, hopefully ileus is resolved. Patient will not require precert prior to discharge. For questions I can be reached at [phone]. Sidra Dave, [facility]    The Plan for Transition of Care is related to the following treatment goals: discharge planning when stable     The Patient and/or patient representative Isadora Jernigan was provided with a choice of provider and agrees   with the discharge plan. [x] Yes [] No    Freedom of choice list was provided with basic dialogue that supports the patient's individualized plan of care/goals, treatment preferences and shares the quality data associated with the providers.  [x] Yes [] No

## 2023-03-16 NOTE — PROGRESS NOTES
weakness, limited endurance, SOB - Hypoxia     Cognition: A & O x 3   Able to Follow Multi-Step Commands w/ Mod VC              Memory:  fair               Sequencing:  fair               Problem solving:  fair               Judgement/safety:  fair   Additional Comments:  Pt was pleasant and cooperative. Flat affect, fair eye contact     Vitals/Lab Values:  Found on room air - O2 sats 91% in semi-supine, decreased to 87% seated EOB w/ mild SOB - improved to 91% w/ ~ 1 min of PLB                         Functional Assessment:  AM-PAC Daily Activity Raw Score: 11/24       Initial Eval Status  Date: 3-13-23    Treatment Status  Date:  3-16-23 STGs = LTGs  Time frame: 10-14 days   Feeding NPO currently     TBA    Per previous session Mod A    SUP/Set up   Grooming Mod     Able to wash hands/face w/ cloth, digits fixed in flexion limiting fine-motor skills for oral care and hair care  Unable to tolerate ambulating to or standing at sink    Min A   At bed-level. Min A  Seated/Standing at the [employer] Mod A     Simulated - seated EOB - limited endurance, UE ROM    Mod A   To manage gown throughout session - difficulty d/t vanessa hand flexion contractures to grasp gown. Min A      LB Dressing Dep     Max A to don socks seated EOB, unable to stand for clothing adjustment over hips - assist of 1-2 simulated - bed level    Dep   To don socks at bed-level. Mod A      Bathing NT     Pt ed re: Benefits of use of Shower chair/Tub Bench    Max A   Increased assist for BLEs & posterior UB hygiene care. Mod A       Toileting Dep     External Catheter  Assist of 2 - simulated bed level    Dep  Extensive posterior hygiene care & pericare at bed-level d/t BM incontinence. Mod A      Bed Mobility  Supine to sit: Max A   Sit to supine:  Max A      VCs for safety, hand placement, use of side-rail     Log Roll:  Mod A Supine to sit: Min A  Sit to supine: Min A      Functional Transfers NT     Unable to tolerate attempt to stand d/t

## 2023-03-16 NOTE — PROGRESS NOTES
Hospitalist Progress Note      SYNOPSIS: Patient admitted on 3/10/2023 for SBO (small bowel obstruction) ([facility]      SUBJECTIVE:    Patient seen and examined. Patient mentioned that he is passing gas today tolerating clear liquid diet. Records reviewed. 53-year-old male past medical history of cervical myopathy, cervical spinal stenosis, prostate cancer presented to small bowel obstruction and acute kidney injury. He was seen by general surgery started on IV fluids for acute kidney injury. His status post exploratory laparotomy and reduction of internal hernia on 3/12/2023 developed postoperative ileus. Subjective :     Feels better , no n/v no dizziness  Remains afebrile       Temp (24hrs), Av.6 °F (37 °C), Min:98.1 °F (36.7 °C), Max:99.1 °F (37.3 °C)    DIET: Diet NPO Exceptions are: Sips of Water with Meds  CODE: Full Code    Intake/Output Summary (Last 24 hours) at 3/16/2023 1421  Last data filed at 3/16/2023 0611  Gross per 24 hour   Intake 2548 ml   Output 1000 ml   Net 1548 ml       OBJECTIVE:    BP (!) 155/98   Pulse 81   Temp 99 °F (37.2 °C) (Oral)   Resp 20   Ht 5' 8\" (1.727 m)   Wt 126 lb 6.4 oz (57.3 kg)   SpO2 97%   BMI 19.22 kg/m²     General appearance: No apparent distress, appears stated age and cooperative. HEENT:  Conjunctivae/corneas clear. Neck: Supple. No jugular venous distention. Respiratory: Clear to auscultation bilaterally, normal respiratory effort  Cardiovascular: Regular rate rhythm, normal S1-S2  Abdomen: Soft, nontender, nondistended  Musculoskeletal: No clubbing, cyanosis, no bilateral lower extremity edema. Skin:  No rashes  on visible skin  Neurologic: awake, alert and following commands     ASSESSMENT:  Small obstruction status post expiratory laparotomy and reduction of internal hernia 3/12/23: Surgery following advance diet per surgery recommendations.   Postoperative ileus  Acute kidney injury, resolved  Hypokalemia   Hypernatremia   history of prostate cancer        Disposition: To rehab once cleared by surgery. Hopefully in the next 1 to 2 days          Medications:  REVIEWED DAILY    Infusion Medications    dextrose 5% and 0.45% NaCl with KCl 20 mEq 100 mL/hr at 03/16/23 0631    sodium chloride       Scheduled Medications    amLODIPine  10 mg Oral Daily    cyclobenzaprine  5 mg Oral BID    tamsulosin  0.4 mg Oral Daily    acetaminophen  650 mg Oral Q6H    sodium chloride flush  10 mL IntraVENous 2 times per day    enoxaparin  40 mg SubCUTAneous Daily     PRN Meds: hydrALAZINE, labetalol, HYDROmorphone **OR** HYDROmorphone, sodium chloride flush, sodium chloride, promethazine **OR** ondansetron    Labs:     Recent Labs     03/14/23  0459 03/15/23  0508 03/16/23 0439   WBC 3.5* 3.8* 5.8   HGB 12.8 11.6* 11.5*   HCT 40.5 38.2 37.9    282 289       Recent Labs     03/14/23  0459 03/15/23  0508 03/16/23  0439   * 149* 143   K 3.3*  3.3* 3.9 3.5   * 116* 109*   CO2 29 28 27   BUN 27* 20 17   CREATININE 0.9 0.8 0.7   CALCIUM 8.3* 7.8* 7.6*       No results for input(s): PROT, ALB, ALKPHOS, ALT, AST, BILITOT, AMYLASE, LIPASE in the last 72 hours. No results for input(s): INR in the last 72 hours. No results for input(s): Maryanne Fuchs in the last 72 hours. Chronic labs:    Lab Results   Component Value Date    INR 1.1 07/13/2022       Radiology: REVIEWED DAILY    +++++++++++++++++++++++++++++++++++++++++++++++++  Curly Oliveira MD  [facility] Physician - [address] [place], [place] [address]jayro Landeros, [facility]  +++++++++++++++++++++++++++++++++++++++++++++++++  NOTE: This report was transcribed using voice recognition software. Every effort was made to ensure accuracy; however, inadvertent computerized transcription errors may be present.

## 2023-03-16 NOTE — PROGRESS NOTES
GENERAL SURGERY  DAILY PROGRESS NOTE  3/16/2023  Chief Complaint   Patient presents with    Bloated     Distended abd, emesis, no bm for 1 wk       Subjective:  Still distended. Not feeling well. On 2.5 L nasal cannula. Passing some gas. No bowel movement. Urine dark. Objective:  BP (!) 150/90   Pulse 88   Temp 99.1 °F (37.3 °C) (Temporal)   Resp 16   Ht 5' 8\" (1.727 m)   Wt 126 lb 6.4 oz (57.3 kg)   SpO2 97%   BMI 19.22 kg/m²     GENERAL:  Laying in bed, awake, alert, cooperative, no apparent distress  HEAD: Normocephalic, atraumatic  EYES: No sclera icterus, pupils equal  LUNGS:  No increased work of breathing on 2 L NC  CARDIOVASCULAR:  RR  ABDOMEN:  Soft, moderately distended, appropriately tender, non-distended.  Incision C/D/I  EXTREMITIES: No edema or swelling  SKIN: Warm and dry    Assessment/Plan:  75 y.o. male with internal hernia s/p ex lap reduction of internal hernia 3/12    Continue n.p.o.  Maintenance IV fluids  PT/OT  Strict I's and O's    Electronically signed by Zeeshan Apple MD on 3/16/2023 at 6:19 AM

## 2023-03-16 NOTE — PROGRESS NOTES
recommended and requires a physician order when medical status permits transport to xray department      Patient/family Goal:    Did not state. Will further assess during treatment. Plan of care discussed with Patient   The Patient understand(s) the diagnosis, prognosis and plan of care     Rehabilitation Potential/Prognosis: fair                    ADMITTING DIAGNOSIS: SBO (small bowel obstruction) ([facility] [K56.609]  NURIA (acute kidney injury) ([facility] [N17.9]  Acute cystitis with hematuria [N30.01]    VISIT DIAGNOSIS:   Visit Diagnoses         Codes    NURIA (acute kidney injury) ([facility]     N17.9             PATIENT REPORT/COMPLAINT: food sticking in the throat  occasional cough  RN cleared patient for participation in assessment     yes     PRIOR LEVEL OF SWALLOW FUNCTION:    PAST HISTORY OF DYSPHAGIA?: yes, per chart review patient had MBSS completed 7/21/22. Profound pharyngeal phase dysphagia following ACDF. Patient stated he was discharged to a SNF and was upgraded to regular solids and thin liquids. Unknown if patient had repeat imagining prior to diet upgrade. Patient states he has had ongoing difficulty swallowing both solids and liquids at home. Home diet: Regular consistency solids (IDDSI level 7) with  thin liquids (IDDSI level 0)  Current Diet Order:  Diet NPO Exceptions are: Sips of Water with Meds    PROCEDURE:  Consistencies Administered During the Evaluation   Liquids: thin liquid   Solids:  not appropriate - physician recommends clear liquids only for assessment    Method of Intake:   cup, spoon  Fed by clinician      Position:   Seated, upright    CLINICAL ASSESSMENT:  Oral Stage:        The oral stage of swallowing was within functional limits for consistencies administered      Pharyngeal Stage:    Throat clearing present after presentation of thin liquid  Immediate wet cough was noted after presentation of thin liquid  Wet respirations were noted after presentation of thin liquid  Congested cough throughout evaluation    Cognition:   Within functional limits for this exam    Oral Peripheral Examination   Adequate lingual/labial strength     Current Respiratory Status    2 liters O2 via nasal cannula     Parameters of Speech Production  Respiration:  Adequate for speech production  Quality:   Within functional limits  Intensity: Within functional limits    Volitional Swallow: present     Volitional Cough:   present     Pain: No pain reported. EDUCATION:   The Speech Language Pathologist (SLP) completed education regarding results of evaluation and that intervention is warranted at this time. Learner: Patient  Education: Reviewed results and recommendations of this evaluation  Evaluation of Education:  Ardena Sever understanding    This plan may be re-evaluated and revised as warranted. Evaluation Time includes thorough review of current medical information, gathering information on past medical history/social history and prior level of function, completion of standardized testing/informal observation of tasks, assessment of data and education on plan of care and goals. [x]The admitting diagnosis and active problem list, have been reviewed prior to initiation of this evaluation.         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

## 2023-03-17 ENCOUNTER — APPOINTMENT (OUTPATIENT)
Dept: GENERAL RADIOLOGY | Age: 68
DRG: 356 | End: 2023-03-17
Payer: OTHER GOVERNMENT

## 2023-03-17 LAB
ALBUMIN SERPL-MCNC: 2.7 G/DL (ref 3.5–5.2)
ALP SERPL-CCNC: 53 U/L (ref 40–129)
ALT SERPL-CCNC: 25 U/L (ref 0–40)
ANION GAP SERPL CALCULATED.3IONS-SCNC: 7 MMOL/L (ref 7–16)
ANION GAP SERPL CALCULATED.3IONS-SCNC: 8 MMOL/L (ref 7–16)
ANISOCYTOSIS: ABNORMAL
AST SERPL-CCNC: 33 U/L (ref 0–39)
BASOPHILS # BLD: 0.04 E9/L (ref 0–0.2)
BASOPHILS NFR BLD: 0.6 % (ref 0–2)
BILIRUB SERPL-MCNC: 0.3 MG/DL (ref 0–1.2)
BUN SERPL-MCNC: 8 MG/DL (ref 6–23)
BUN SERPL-MCNC: 9 MG/DL (ref 6–23)
BURR CELLS: ABNORMAL
CALCIUM SERPL-MCNC: 7.8 MG/DL (ref 8.6–10.2)
CALCIUM SERPL-MCNC: 7.9 MG/DL (ref 8.6–10.2)
CHLORIDE SERPL-SCNC: 105 MMOL/L (ref 98–107)
CHLORIDE SERPL-SCNC: 108 MMOL/L (ref 98–107)
CO2 SERPL-SCNC: 22 MMOL/L (ref 22–29)
CO2 SERPL-SCNC: 28 MMOL/L (ref 22–29)
CREAT SERPL-MCNC: 0.6 MG/DL (ref 0.7–1.2)
CREAT SERPL-MCNC: 0.7 MG/DL (ref 0.7–1.2)
EOSINOPHIL # BLD: 0.1 E9/L (ref 0.05–0.5)
EOSINOPHIL NFR BLD: 1.4 % (ref 0–6)
ERYTHROCYTE [DISTWIDTH] IN BLOOD BY AUTOMATED COUNT: 16.5 FL (ref 11.5–15)
ERYTHROCYTE [DISTWIDTH] IN BLOOD BY AUTOMATED COUNT: 16.9 FL (ref 11.5–15)
GLUCOSE SERPL-MCNC: 91 MG/DL (ref 74–99)
GLUCOSE SERPL-MCNC: 95 MG/DL (ref 74–99)
HCT VFR BLD AUTO: 40.3 % (ref 37–54)
HCT VFR BLD AUTO: 42.8 % (ref 37–54)
HGB BLD-MCNC: 12.2 G/DL (ref 12.5–16.5)
HGB BLD-MCNC: 12.2 G/DL (ref 12.5–16.5)
IMM GRANULOCYTES # BLD: 0.1 E9/L
IMM GRANULOCYTES NFR BLD: 1.4 % (ref 0–5)
LYMPHOCYTES # BLD: 0.84 E9/L (ref 1.5–4)
LYMPHOCYTES NFR BLD: 12.1 % (ref 20–42)
MCH RBC QN AUTO: 26.8 PG (ref 26–35)
MCH RBC QN AUTO: 26.9 PG (ref 26–35)
MCHC RBC AUTO-ENTMCNC: 28.5 % (ref 32–34.5)
MCHC RBC AUTO-ENTMCNC: 30.3 % (ref 32–34.5)
MCV RBC AUTO: 88.4 FL (ref 80–99.9)
MCV RBC AUTO: 94.5 FL (ref 80–99.9)
MONOCYTES # BLD: 0.35 E9/L (ref 0.1–0.95)
MONOCYTES NFR BLD: 5 % (ref 2–12)
NEUTROPHILS # BLD: 5.53 E9/L (ref 1.8–7.3)
NEUTS SEG NFR BLD: 79.5 % (ref 43–80)
OVALOCYTES: ABNORMAL
PLATELET # BLD AUTO: 277 E9/L (ref 130–450)
PLATELET # BLD AUTO: 297 E9/L (ref 130–450)
PMV BLD AUTO: 10.4 FL (ref 7–12)
PMV BLD AUTO: 8.5 FL (ref 7–12)
POIKILOCYTES: ABNORMAL
POTASSIUM SERPL-SCNC: 3.8 MMOL/L (ref 3.5–5)
POTASSIUM SERPL-SCNC: 4.6 MMOL/L (ref 3.5–5)
PROT SERPL-MCNC: 5.4 G/DL (ref 6.4–8.3)
RBC # BLD AUTO: 4.53 E12/L (ref 3.8–5.8)
RBC # BLD AUTO: 4.56 E12/L (ref 3.8–5.8)
SODIUM SERPL-SCNC: 138 MMOL/L (ref 132–146)
SODIUM SERPL-SCNC: 140 MMOL/L (ref 132–146)
TOXIC GRANULATION: ABNORMAL
TROPONIN, HIGH SENSITIVITY: 23 NG/L (ref 0–11)
VACUOLATED NEUTROPHILS: ABNORMAL
WBC # BLD: 7 E9/L (ref 4.5–11.5)
WBC # BLD: 7.3 E9/L (ref 4.5–11.5)

## 2023-03-17 PROCEDURE — 2500000003 HC RX 250 WO HCPCS: Performed by: STUDENT IN AN ORGANIZED HEALTH CARE EDUCATION/TRAINING PROGRAM

## 2023-03-17 PROCEDURE — 85027 COMPLETE CBC AUTOMATED: CPT

## 2023-03-17 PROCEDURE — 71045 X-RAY EXAM CHEST 1 VIEW: CPT

## 2023-03-17 PROCEDURE — 6360000002 HC RX W HCPCS: Performed by: STUDENT IN AN ORGANIZED HEALTH CARE EDUCATION/TRAINING PROGRAM

## 2023-03-17 PROCEDURE — 36415 COLL VENOUS BLD VENIPUNCTURE: CPT

## 2023-03-17 PROCEDURE — 2060000000 HC ICU INTERMEDIATE R&B

## 2023-03-17 PROCEDURE — 84484 ASSAY OF TROPONIN QUANT: CPT

## 2023-03-17 PROCEDURE — 92611 MOTION FLUOROSCOPY/SWALLOW: CPT

## 2023-03-17 PROCEDURE — 93005 ELECTROCARDIOGRAM TRACING: CPT | Performed by: INTERNAL MEDICINE

## 2023-03-17 PROCEDURE — 2700000000 HC OXYGEN THERAPY PER DAY

## 2023-03-17 PROCEDURE — 80053 COMPREHEN METABOLIC PANEL: CPT

## 2023-03-17 PROCEDURE — 6370000000 HC RX 637 (ALT 250 FOR IP): Performed by: STUDENT IN AN ORGANIZED HEALTH CARE EDUCATION/TRAINING PROGRAM

## 2023-03-17 PROCEDURE — 80048 BASIC METABOLIC PNL TOTAL CA: CPT

## 2023-03-17 PROCEDURE — 99024 POSTOP FOLLOW-UP VISIT: CPT | Performed by: SURGERY

## 2023-03-17 PROCEDURE — 74230 X-RAY XM SWLNG FUNCJ C+: CPT

## 2023-03-17 PROCEDURE — 2580000003 HC RX 258: Performed by: STUDENT IN AN ORGANIZED HEALTH CARE EDUCATION/TRAINING PROGRAM

## 2023-03-17 PROCEDURE — 6370000000 HC RX 637 (ALT 250 FOR IP): Performed by: INTERNAL MEDICINE

## 2023-03-17 PROCEDURE — 85025 COMPLETE CBC W/AUTO DIFF WBC: CPT

## 2023-03-17 PROCEDURE — 92526 ORAL FUNCTION THERAPY: CPT

## 2023-03-17 RX ORDER — OXYCODONE HYDROCHLORIDE 5 MG/1
5 TABLET ORAL EVERY 6 HOURS PRN
Status: DISCONTINUED | OUTPATIENT
Start: 2023-03-17 | End: 2023-03-23 | Stop reason: HOSPADM

## 2023-03-17 RX ADMIN — ENOXAPARIN SODIUM 40 MG: 100 INJECTION SUBCUTANEOUS at 09:50

## 2023-03-17 RX ADMIN — SODIUM CHLORIDE, PRESERVATIVE FREE 10 ML: 5 INJECTION INTRAVENOUS at 20:17

## 2023-03-17 RX ADMIN — CYCLOBENZAPRINE HYDROCHLORIDE 5 MG: 5 TABLET, FILM COATED ORAL at 20:16

## 2023-03-17 RX ADMIN — ACETAMINOPHEN 650 MG: 325 TABLET ORAL at 20:18

## 2023-03-17 RX ADMIN — CYCLOBENZAPRINE HYDROCHLORIDE 5 MG: 5 TABLET, FILM COATED ORAL at 09:51

## 2023-03-17 RX ADMIN — POTASSIUM CHLORIDE, DEXTROSE MONOHYDRATE AND SODIUM CHLORIDE: 150; 5; 450 INJECTION, SOLUTION INTRAVENOUS at 21:34

## 2023-03-17 RX ADMIN — TAMSULOSIN HYDROCHLORIDE 0.4 MG: 0.4 CAPSULE ORAL at 09:50

## 2023-03-17 RX ADMIN — OXYCODONE HYDROCHLORIDE 5 MG: 5 TABLET ORAL at 23:05

## 2023-03-17 RX ADMIN — ONDANSETRON 4 MG: 2 INJECTION INTRAMUSCULAR; INTRAVENOUS at 10:02

## 2023-03-17 RX ADMIN — AMLODIPINE BESYLATE 10 MG: 10 TABLET ORAL at 09:50

## 2023-03-17 RX ADMIN — HYDROMORPHONE HYDROCHLORIDE 0.5 MG: 1 INJECTION, SOLUTION INTRAMUSCULAR; INTRAVENOUS; SUBCUTANEOUS at 09:53

## 2023-03-17 RX ADMIN — POTASSIUM CHLORIDE, DEXTROSE MONOHYDRATE AND SODIUM CHLORIDE: 150; 5; 450 INJECTION, SOLUTION INTRAVENOUS at 05:16

## 2023-03-17 ASSESSMENT — PAIN SCALES - GENERAL
PAINLEVEL_OUTOF10: 8
PAINLEVEL_OUTOF10: 7
PAINLEVEL_OUTOF10: 7
PAINLEVEL_OUTOF10: 0
PAINLEVEL_OUTOF10: 4
PAINLEVEL_OUTOF10: 5

## 2023-03-17 ASSESSMENT — PAIN DESCRIPTION - DESCRIPTORS
DESCRIPTORS: ACHING;SORE
DESCRIPTORS: ACHING
DESCRIPTORS: STABBING
DESCRIPTORS: TINGLING;NUMBNESS

## 2023-03-17 ASSESSMENT — PAIN DESCRIPTION - ONSET
ONSET: SUDDEN
ONSET: ON-GOING

## 2023-03-17 ASSESSMENT — PAIN DESCRIPTION - ORIENTATION
ORIENTATION: LEFT;RIGHT
ORIENTATION: LEFT
ORIENTATION: LEFT;MID

## 2023-03-17 ASSESSMENT — PAIN DESCRIPTION - LOCATION
LOCATION: CHEST
LOCATION: ABDOMEN
LOCATION: FINGER (COMMENT WHICH ONE)
LOCATION: CHEST

## 2023-03-17 ASSESSMENT — PAIN DESCRIPTION - PAIN TYPE
TYPE: ACUTE PAIN
TYPE: SURGICAL PAIN;ACUTE PAIN

## 2023-03-17 ASSESSMENT — PAIN DESCRIPTION - FREQUENCY
FREQUENCY: CONTINUOUS
FREQUENCY: CONTINUOUS

## 2023-03-17 NOTE — CARE COORDINATION
Patient for MBSS, was able to tolerate diet and will not require tpn. Will begin to trial diet. [facility] following and willing to accept. Pasrr and ambulance on soft chart. Will not require precert prior to discharge. For questions I can be reached at [phone].  Francisco Javier Chi, [place]

## 2023-03-17 NOTE — PROGRESS NOTES
formerly [facility]   ATTENDING PHYSICIAN PROGRESS NOTE     I have examined the patient, reviewed the record, and discussed the case with the APN/ Resident. I have reviewed all relevant labs and imaging data. Please refer to the APN/ resident's note. I agree with the assessment and plan with the following corrections/ additions. The following summarizes my clinical findings and independent assessment. CC: SBO    Patient reports he is passing flatus and having BMs. Denies abd pain. Denies nausea. 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 Every effort was made to ensure accuracy; however, inadvertent computerized transcription errors may be present. 12-Feb-2021 17:02

## 2023-03-17 NOTE — PROGRESS NOTES
[phone]- contacted speech, no answer. Voicemail left a message to call unit for a time when patient would be going down for barium swallow test, there was no call back. RN contacted radiology, they will send for patient at 1400.

## 2023-03-17 NOTE — PROGRESS NOTES
Pt tolerating clear liquid diet, ate 50-75% of food. Denies nausea,vomiting, abdominal pain. Bowel sounds present in all quadrants.

## 2023-03-17 NOTE — PROGRESS NOTES
resolved  Hypokalemia   Hypernatremia   history of prostate cancer        Disposition: To rehab ending clinical progression. Hopefully in the next 3 to 4 days          Medications:  REVIEWED DAILY    Infusion Medications    dextrose 5% and 0.45% NaCl with KCl 20 mEq 100 mL/hr at 03/17/23 0516    sodium chloride       Scheduled Medications    amLODIPine  10 mg Oral Daily    cyclobenzaprine  5 mg Oral BID    tamsulosin  0.4 mg Oral Daily    acetaminophen  650 mg Oral Q6H    sodium chloride flush  10 mL IntraVENous 2 times per day    enoxaparin  40 mg SubCUTAneous Daily     PRN Meds: hydrALAZINE, labetalol, HYDROmorphone **OR** HYDROmorphone, sodium chloride flush, sodium chloride, promethazine **OR** ondansetron    Labs:     Recent Labs     03/15/23  0508 03/16/23  0439 03/17/23  0531   WBC 3.8* 5.8 7.0   HGB 11.6* 11.5* 12.2*   HCT 38.2 37.9 42.8    289 277       Recent Labs     03/15/23  0508 03/16/23  0439 03/17/23  0531   * 143 138   K 3.9 3.5 4.6   * 109* 108*   CO2 28 27 22   BUN 20 17 9   CREATININE 0.8 0.7 0.6*   CALCIUM 7.8* 7.6* 7.8*       No results for input(s): PROT, ALB, ALKPHOS, ALT, AST, BILITOT, AMYLASE, LIPASE in the last 72 hours. No results for input(s): INR in the last 72 hours. No results for input(s): Xenia Balsam in the last 72 hours. Chronic labs:    Lab Results   Component Value Date    INR 1.1 07/13/2022       Radiology: REVIEWED DAILY    +++++++++++++++++++++++++++++++++++++++++++++++++  Manuel Evangelista MD  [facility] Physician - [address], [place]  +++++++++++++++++++++++++++++++++++++++++++++++++  NOTE: This report was transcribed using voice recognition software. Every effort was made to ensure accuracy; however, inadvertent computerized transcription errors may be present.

## 2023-03-17 NOTE — PROGRESS NOTES
Physician Progress Note      PATIENTCaffie Dance Elma Puller  CSN #:                  [id number]  :                       1955  ADMIT DATE:       3/10/2023 4:51 PM  [address] [place] DATE:  RESPONDING  PROVIDER #:        Brianna Davis MD          QUERY TEXT:    Patient admitted with bowel obstruction. Noted to have severe malnutrition in   dietary consult. If possible, please document in progress notes and discharge   summary if you are evaluating and /or treating any of the following: The medical record reflects the following:  Risk Factors: Chronic illness  Clinical Indicators: Per Dietary Consult Severe malnutrition, In context of   chronic illness related to inadequate protein-energy intake (2/2 chronic poor   appetite w/ prostate CA hx) as evidenced by 75% or less estimated energy   requirements for 1 month or longer; Severe body fat loss, Severe muscle mass   loss, BMI 19.2  Treatment: Dietary Consult, NPO per plan of care    Thank you  Dianne ANDERSONN, RN, CCDS  Clinical Documentation Improvement  Options provided:  -- Protein calorie malnutrition severe  -- Other - I will add my own diagnosis  -- Disagree - Not applicable / Not valid  -- Disagree - Clinically unable to determine / Unknown  -- Refer to Clinical Documentation Reviewer    PROVIDER RESPONSE TEXT:    This patient has severe protein calorie malnutrition.     Query created by: Catherine Parson on  32:09 AM      Electronically signed by:  Brianna Davis MD 3/17/2023 1:37 PM

## 2023-03-17 NOTE — PROGRESS NOTES
SPEECH/LANGUAGE PATHOLOGY  VIDEOFLUOROSCOPIC STUDY OF SWALLOWING (MBS)   and PLAN OF CARE    PATIENT NAME:  Jenn Avendaño  (male)     MRN:  [id number]    :  1955  (75 y.o.)  STATUS:  Inpatient: Room [facility]/4516-B    TODAY'S DATE:  3/17/2023  REFERRING PROVIDER:   Dr. Kamille Masterson: [place] modified barium swallow with video  Date of order:  3-16-23   REASON FOR REFERRAL: dysphagia   EVALUATING THERAPIST: JESUS Babcock      RESULTS:      DYSPHAGIA DIAGNOSIS:  Minimal pharyngeal dysphagia    DIET RECOMMENDATIONS:  Regular consistency solids (IDDSI level 7) with  thin liquids (IDDSI level 0)    FEEDING RECOMMENDATIONS:    Assistance level:  Set-up is required for all oral intake     Compensatory strategies recommended: No straw     Discussed recommendations with nursing and/or faxed report to referring provider: Nursing not available, diet change recommendation placed in Physician sticky note section     SPEECH THERAPY  PLAN OF CARE   The dysphagia POC is established based on physician order and dysphagia diagnosis    Dysphagia therapy is not recommended       Conditions Requiring Skilled Therapeutic Intervention for dysphagia:    Not applicable    SPECIFIC DYSPHAGIA INTERVENTIONS TO INCLUDE:     not applicable    Specific instructions for next treatment:  not applicable   Treatment Goals:    Short Term Goals:  Not applicable no therapy warranted     Long Term Goals:   Not applicable no therapy warranted      Patient/family Goal:    not applicable                  ADMITTING DIAGNOSIS: SBO (small bowel obstruction) ([facility] [K56.609]  NURIA (acute kidney injury) ([facility] [N17.9]  Acute cystitis with hematuria [N30.01]     VISIT DIAGNOSIS:   Visit Diagnoses         Codes    NURIA (acute kidney injury) ([facility]     N17.9                PATIENT REPORT/COMPLAINT: patient currently NPO pending results of this evaluation    PRIOR LEVEL OF SWALLOW FUNCTION:    Current Diet Order: Diet NPO Exceptions are: Sips of time.  Learner: Patient  Education: Reviewed results and recommendations of this evaluation  Evaluation of Education:  Verbalizes understanding    This plan may be re-evaluated and revised as warranted. Evaluation Time includes thorough review of current medical information, gathering information on past medical history/social history and prior level of function, completion of standardized testing/informal observation of tasks, assessment of data and education on plan of care and goals. [x]The admitting diagnosis and active problem list, have been reviewed prior to initiation of this evaluation.     CPT Code: 47235  dysphagia study    ACTIVE PROBLEM LIST:   Patient Active Problem List   Diagnosis    Cervical spinal stenosis    Cervical stenosis of spinal canal    Cervical myelopathy (HCC)    Cervical stenosis of spine    Severe protein-calorie malnutrition ([address]    Disruption or dehiscence of closure of mucosa    SBO (small bowel obstruction) (HCC)    Small bowel obstruction ([address]

## 2023-03-17 NOTE — PROGRESS NOTES
GENERAL SURGERY  DAILY PROGRESS NOTE  3/17/2023  Chief Complaint   Patient presents with    Bloated     Distended abd, emesis, no bm for 1 wk       Subjective:  No acute events overnight. Pain well controlled. No nausea no vomiting. Multiple bowel movements overnight    Objective:  BP (!) 151/95   Pulse 86   Temp 97.9 °F (36.6 °C) (Oral)   Resp 16   Ht 5' 8\" (1.727 m)   Wt 126 lb 6.4 oz (57.3 kg)   SpO2 97%   BMI 19.22 kg/m²     GENERAL:  Laying in bed, awake, alert, cooperative, no apparent distress  HEAD: Normocephalic, atraumatic  EYES: No sclera icterus, pupils equal  LUNGS:  No increased work of breathing on 2 L NC  CARDIOVASCULAR:  RR  ABDOMEN:  Soft, moderately distended, appropriately tender, Incision C/D/I  EXTREMITIES: No edema or swelling  SKIN: Warm and dry    Assessment/Plan:  75 y.o. male with internal hernia s/p ex lap reduction of internal hernia 3/12    Continue n.p.o.  Maintenance IV fluids  PT/OT  Strict I's and O's    Patient for modified barium swallow today.   If patient fails he will need a PICC line and TPN    Electronically signed by Chago Gomez MD on 3/17/2023 at 7:09 AM

## 2023-03-18 LAB
ANION GAP SERPL CALCULATED.3IONS-SCNC: 8 MMOL/L (ref 7–16)
ANISOCYTOSIS: ABNORMAL
BASOPHILS # BLD: 0.03 E9/L (ref 0–0.2)
BASOPHILS NFR BLD: 0.3 % (ref 0–2)
BUN SERPL-MCNC: 8 MG/DL (ref 6–23)
BURR CELLS: ABNORMAL
CALCIUM SERPL-MCNC: 7.7 MG/DL (ref 8.6–10.2)
CHLORIDE SERPL-SCNC: 108 MMOL/L (ref 98–107)
CO2 SERPL-SCNC: 25 MMOL/L (ref 22–29)
CREAT SERPL-MCNC: 0.7 MG/DL (ref 0.7–1.2)
EKG ATRIAL RATE: 105 BPM
EKG P AXIS: 22 DEGREES
EKG P-R INTERVAL: 128 MS
EKG Q-T INTERVAL: 352 MS
EKG QRS DURATION: 84 MS
EKG QTC CALCULATION (BAZETT): 465 MS
EKG R AXIS: -10 DEGREES
EKG T AXIS: 12 DEGREES
EKG VENTRICULAR RATE: 105 BPM
EOSINOPHIL # BLD: 0.08 E9/L (ref 0.05–0.5)
EOSINOPHIL NFR BLD: 0.7 % (ref 0–6)
ERYTHROCYTE [DISTWIDTH] IN BLOOD BY AUTOMATED COUNT: 16.5 FL (ref 11.5–15)
GLUCOSE SERPL-MCNC: 112 MG/DL (ref 74–99)
HCT VFR BLD AUTO: 39.6 % (ref 37–54)
HGB BLD-MCNC: 12.3 G/DL (ref 12.5–16.5)
HYPOCHROMIA: ABNORMAL
IMM GRANULOCYTES # BLD: 0.14 E9/L
IMM GRANULOCYTES NFR BLD: 1.2 % (ref 0–5)
LYMPHOCYTES # BLD: 0.52 E9/L (ref 1.5–4)
LYMPHOCYTES NFR BLD: 4.6 % (ref 20–42)
MCH RBC QN AUTO: 27.3 PG (ref 26–35)
MCHC RBC AUTO-ENTMCNC: 31.1 % (ref 32–34.5)
MCV RBC AUTO: 88 FL (ref 80–99.9)
MONOCYTES # BLD: 0.31 E9/L (ref 0.1–0.95)
MONOCYTES NFR BLD: 2.8 % (ref 2–12)
NEUTROPHILS # BLD: 10.13 E9/L (ref 1.8–7.3)
NEUTS SEG NFR BLD: 90.4 % (ref 43–80)
OVALOCYTES: ABNORMAL
PLATELET # BLD AUTO: 285 E9/L (ref 130–450)
PMV BLD AUTO: 8.9 FL (ref 7–12)
POIKILOCYTES: ABNORMAL
POLYCHROMASIA: ABNORMAL
POTASSIUM SERPL-SCNC: 3.8 MMOL/L (ref 3.5–5)
RBC # BLD AUTO: 4.5 E12/L (ref 3.8–5.8)
SODIUM SERPL-SCNC: 141 MMOL/L (ref 132–146)
TARGET CELLS: ABNORMAL
TROPONIN, HIGH SENSITIVITY: 21 NG/L (ref 0–11)
WBC # BLD: 11.2 E9/L (ref 4.5–11.5)

## 2023-03-18 PROCEDURE — 80048 BASIC METABOLIC PNL TOTAL CA: CPT

## 2023-03-18 PROCEDURE — 84484 ASSAY OF TROPONIN QUANT: CPT

## 2023-03-18 PROCEDURE — 2060000000 HC ICU INTERMEDIATE R&B

## 2023-03-18 PROCEDURE — 6360000002 HC RX W HCPCS: Performed by: STUDENT IN AN ORGANIZED HEALTH CARE EDUCATION/TRAINING PROGRAM

## 2023-03-18 PROCEDURE — 93010 ELECTROCARDIOGRAM REPORT: CPT | Performed by: INTERNAL MEDICINE

## 2023-03-18 PROCEDURE — 36415 COLL VENOUS BLD VENIPUNCTURE: CPT

## 2023-03-18 PROCEDURE — 6370000000 HC RX 637 (ALT 250 FOR IP)

## 2023-03-18 PROCEDURE — 85025 COMPLETE CBC W/AUTO DIFF WBC: CPT

## 2023-03-18 PROCEDURE — 2580000003 HC RX 258: Performed by: STUDENT IN AN ORGANIZED HEALTH CARE EDUCATION/TRAINING PROGRAM

## 2023-03-18 PROCEDURE — 99024 POSTOP FOLLOW-UP VISIT: CPT | Performed by: SURGERY

## 2023-03-18 PROCEDURE — 2700000000 HC OXYGEN THERAPY PER DAY

## 2023-03-18 PROCEDURE — 6370000000 HC RX 637 (ALT 250 FOR IP): Performed by: STUDENT IN AN ORGANIZED HEALTH CARE EDUCATION/TRAINING PROGRAM

## 2023-03-18 RX ORDER — BISACODYL 10 MG
10 SUPPOSITORY, RECTAL RECTAL DAILY
Status: DISCONTINUED | OUTPATIENT
Start: 2023-03-18 | End: 2023-03-23 | Stop reason: HOSPADM

## 2023-03-18 RX ADMIN — AMLODIPINE BESYLATE 10 MG: 10 TABLET ORAL at 10:19

## 2023-03-18 RX ADMIN — ENOXAPARIN SODIUM 40 MG: 100 INJECTION SUBCUTANEOUS at 10:19

## 2023-03-18 RX ADMIN — SODIUM CHLORIDE, PRESERVATIVE FREE 10 ML: 5 INJECTION INTRAVENOUS at 21:33

## 2023-03-18 RX ADMIN — CYCLOBENZAPRINE HYDROCHLORIDE 5 MG: 5 TABLET, FILM COATED ORAL at 21:33

## 2023-03-18 RX ADMIN — CYCLOBENZAPRINE HYDROCHLORIDE 5 MG: 5 TABLET, FILM COATED ORAL at 10:20

## 2023-03-18 RX ADMIN — ACETAMINOPHEN 650 MG: 325 TABLET ORAL at 10:21

## 2023-03-18 RX ADMIN — TAMSULOSIN HYDROCHLORIDE 0.4 MG: 0.4 CAPSULE ORAL at 10:19

## 2023-03-18 RX ADMIN — ACETAMINOPHEN 650 MG: 325 TABLET ORAL at 16:53

## 2023-03-18 RX ADMIN — SODIUM CHLORIDE, PRESERVATIVE FREE 10 ML: 5 INJECTION INTRAVENOUS at 10:19

## 2023-03-18 RX ADMIN — BISACODYL 10 MG: 10 SUPPOSITORY RECTAL at 10:19

## 2023-03-18 ASSESSMENT — PAIN DESCRIPTION - LOCATION
LOCATION: ABDOMEN

## 2023-03-18 ASSESSMENT — PAIN DESCRIPTION - DESCRIPTORS
DESCRIPTORS: ACHING;DISCOMFORT;DULL

## 2023-03-18 ASSESSMENT — PAIN SCALES - GENERAL
PAINLEVEL_OUTOF10: 3
PAINLEVEL_OUTOF10: 7
PAINLEVEL_OUTOF10: 6

## 2023-03-18 NOTE — PROGRESS NOTES
Perfect serve message sent to Dr. Claudio Del Valle this evening regarding patient's new complaints of left sided CP and SOB. Patient states his pain just began and describes it as \"stabbing\". Patient also states that he feels it may be related to him starting to eat today and just had a bout of coughing not witnessed by this RN. Vital signs obtained. /101, , oxygen saturation 98% on 2L NC. EKG obtained and placed in soft chart and transmitted to EMR. New orders obtained from Dr. Claudio Del Valle and placed including troponin every 4 hours for two occurrences, CBC, BMP, CXR, and PRN order for oxycodone.

## 2023-03-18 NOTE — PROGRESS NOTES
GENERAL SURGERY  DAILY PROGRESS NOTE  3/18/2023  Chief Complaint   Patient presents with    Bloated     Distended abd, emesis, no bm for 1 wk       Subjective:  States he had some abdominal pain with full liquid diet yesterday however no nausea or emesis. Still passing flatus. No BM.      Objective:  /83   Pulse (!) 104   Temp 98.2 °F (36.8 °C) (Oral)   Resp 18   Ht 5' 8\" (1.727 m)   Wt 126 lb 6.4 oz (57.3 kg)   SpO2 96%   BMI 19.22 kg/m²     GENERAL:  Laying in bed, awake, alert, cooperative, no apparent distress  HEAD: Normocephalic, atraumatic  EYES: No sclera icterus, pupils equal  LUNGS:  No increased work of breathing on 2 L NC  CARDIOVASCULAR:  RR  ABDOMEN:  moderately distended, appropriately tender, Incision C/D/I  EXTREMITIES: No edema or swelling  SKIN: Warm and dry    Assessment/Plan:  75 y.o. male with internal hernia s/p ex lap reduction of internal hernia 3/12    Advance to regular diet  DC IV fluids  Add suppository   PT/OT  Strict I's and O's    Electronically signed by Ag August MD on 3/18/2023 at 8:48 AM

## 2023-03-18 NOTE — PROGRESS NOTES
Physician Progress Note      Adithya Lima  CSN #:                  [id number]  :                       1955  ADMIT DATE:       3/10/2023 4:51 PM  [address] [place] DATE:  RESPONDING  PROVIDER #:        Thierno Dacosta MD          QUERY TEXT:    Pt admitted with SBO and underwent ex lap with reduction of internal hernia   noted documentation of  postop ileus. ? If possible, please document in   progress notes and discharge summary the relationship if any between the post   op ileus and the surgery: The medical record reflects the following:  Risk Factors: SBO  Clinical Indicators:  progress note 3/13 Continue NPO, NGT. Patient pulled out   NGT recommend replacing it as patient is distended and high risk for ileus   Per progress note 3/14- Post op ileus  Treatment: NG tube, monitor bowel function, encourage pt out of bed    Thank you  Vicente PIEDRA, RN, CCDS  Clinical Documentation Improvement  Options provided:  -- post op ileus is due to the procedure  -- Ileus is not due to the procedure, but is due to other incidental risk   factor, Please specify other incidental risk factor. -- Other - I will add my own diagnosis  -- Disagree - Not applicable / Not valid  -- Disagree - Clinically unable to determine / Unknown  -- Refer to Clinical Documentation Reviewer    PROVIDER RESPONSE TEXT:    Patient has post op ileus due to the procedure.     Query created by: Vu Sam on 2/15/1182 20:55 AM      Electronically signed by:  Thierno Dacosta MD 3/17/2023 9:40 PM

## 2023-03-18 NOTE — PLAN OF CARE
Problem: Pain  Goal: Verbalizes/displays adequate comfort level or baseline comfort level  Outcome: Progressing     Problem: Safety - Adult  Goal: Free from fall injury  Outcome: Progressing     Problem: Skin/Tissue Integrity  Goal: Absence of new skin breakdown  Description: 1. Monitor for areas of redness and/or skin breakdown  2. Assess vascular access sites hourly  3. Every 4-6 hours minimum:  Change oxygen saturation probe site  4. Every 4-6 hours:  If on nasal continuous positive airway pressure, respiratory therapy assess nares and determine need for appliance change or resting period.   Outcome: Progressing     Problem: ABCDS Injury Assessment  Goal: Absence of physical injury  Outcome: Progressing     Problem: Nutrition Deficit:  Goal: Optimize nutritional status  Outcome: Progressing

## 2023-03-18 NOTE — PROGRESS NOTES
[facility]   ATTENDING PHYSICIAN PROGRESS NOTE     I have examined the patient, reviewed the record, and discussed the case with the APN/ Resident. I have reviewed all relevant labs and imaging data. Please refer to the APN/ resident's note. I agree with the assessment and plan with the following corrections/ additions. The following summarizes my clinical findings and independent assessment. CC: SBO    Patient reports he is passing flatus and having BMs. States he is trying to take it easy with the diet. 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 Every effort was made to ensure accuracy; however, inadvertent computerized transcription errors may be present.

## 2023-03-18 NOTE — PROGRESS NOTES
following , modified barium swallow , postop ileus resolved. Diet advanced per surgery. Postoperative ileus  Acute kidney injury, resolved  Hypokalemia   Hypernatremia   history of prostate cancer        Disposition: To rehab ending clinical progression. Hopefully Monday. Medications:  REVIEWED DAILY    Infusion Medications    sodium chloride       Scheduled Medications    bisacodyl  10 mg Rectal Daily    amLODIPine  10 mg Oral Daily    cyclobenzaprine  5 mg Oral BID    tamsulosin  0.4 mg Oral Daily    acetaminophen  650 mg Oral Q6H    sodium chloride flush  10 mL IntraVENous 2 times per day    enoxaparin  40 mg SubCUTAneous Daily     PRN Meds: oxyCODONE, hydrALAZINE, labetalol, HYDROmorphone **OR** HYDROmorphone, sodium chloride flush, sodium chloride, promethazine **OR** ondansetron    Labs:     Recent Labs     03/17/23  0531 03/17/23  2228 03/18/23  0109   WBC 7.0 7.3 11.2   HGB 12.2* 12.2* 12.3*   HCT 42.8 40.3 39.6    297 285       Recent Labs     03/17/23  0531 03/17/23 2128 03/18/23  0109    140 141   K 4.6 3.8 3.8   * 105 108*   CO2 22 28 25   BUN 9 8 8   CREATININE 0.6* 0.7 0.7   CALCIUM 7.8* 7.9* 7.7*       Recent Labs     03/17/23 2128   PROT 5.4*   ALKPHOS 53   ALT 25   AST 33   BILITOT 0.3       No results for input(s): INR in the last 72 hours. No results for input(s): Jonas Burnett in the last 72 hours. Chronic labs:    Lab Results   Component Value Date    INR 1.1 07/13/2022       Radiology: REVIEWED DAILY    +++++++++++++++++++++++++++++++++++++++++++++++++  Jeri Patricia MD  [facility] Physician - [address] [facility], [place]  +++++++++++++++++++++++++++++++++++++++++++++++++  NOTE: This report was transcribed using voice recognition software. Every effort was made to ensure accuracy; however, inadvertent computerized transcription errors may be present.

## 2023-03-18 NOTE — PLAN OF CARE
Problem: Pain  Goal: Verbalizes/displays adequate comfort level or baseline comfort level  3/18/2023 1121 by Gucci Banerjee RN  Outcome: Progressing  3/17/2023 2252 by Cal Rosas RN  Outcome: Progressing     Problem: Safety - Adult  Goal: Free from fall injury  3/18/2023 1121 by Gucci Banerjee RN  Outcome: Progressing  Flowsheets (Taken 3/18/2023 1120)  Free From Fall Injury: Instruct family/caregiver on patient safety  3/17/2023 2252 by Cal Rosas RN  Outcome: Progressing

## 2023-03-19 LAB
ANION GAP SERPL CALCULATED.3IONS-SCNC: 7 MMOL/L (ref 7–16)
BASOPHILS # BLD: 0.01 E9/L (ref 0–0.2)
BASOPHILS NFR BLD: 0.2 % (ref 0–2)
BUN SERPL-MCNC: 13 MG/DL (ref 6–23)
CALCIUM SERPL-MCNC: 8 MG/DL (ref 8.6–10.2)
CHLORIDE SERPL-SCNC: 106 MMOL/L (ref 98–107)
CO2 SERPL-SCNC: 27 MMOL/L (ref 22–29)
CREAT SERPL-MCNC: 0.7 MG/DL (ref 0.7–1.2)
EOSINOPHIL # BLD: 0.1 E9/L (ref 0.05–0.5)
EOSINOPHIL NFR BLD: 1.5 % (ref 0–6)
ERYTHROCYTE [DISTWIDTH] IN BLOOD BY AUTOMATED COUNT: 16.6 FL (ref 11.5–15)
GLUCOSE SERPL-MCNC: 86 MG/DL (ref 74–99)
HCT VFR BLD AUTO: 36 % (ref 37–54)
HGB BLD-MCNC: 11.4 G/DL (ref 12.5–16.5)
IMM GRANULOCYTES # BLD: 0.07 E9/L
IMM GRANULOCYTES NFR BLD: 1.1 % (ref 0–5)
LYMPHOCYTES # BLD: 0.79 E9/L (ref 1.5–4)
LYMPHOCYTES NFR BLD: 12 % (ref 20–42)
MCH RBC QN AUTO: 27 PG (ref 26–35)
MCHC RBC AUTO-ENTMCNC: 31.7 % (ref 32–34.5)
MCV RBC AUTO: 85.1 FL (ref 80–99.9)
MONOCYTES # BLD: 0.42 E9/L (ref 0.1–0.95)
MONOCYTES NFR BLD: 6.4 % (ref 2–12)
NEUTROPHILS # BLD: 5.19 E9/L (ref 1.8–7.3)
NEUTS SEG NFR BLD: 78.8 % (ref 43–80)
PLATELET # BLD AUTO: 353 E9/L (ref 130–450)
PMV BLD AUTO: 8.8 FL (ref 7–12)
POTASSIUM SERPL-SCNC: 4 MMOL/L (ref 3.5–5)
RBC # BLD AUTO: 4.23 E12/L (ref 3.8–5.8)
SODIUM SERPL-SCNC: 140 MMOL/L (ref 132–146)
WBC # BLD: 6.6 E9/L (ref 4.5–11.5)

## 2023-03-19 PROCEDURE — 85025 COMPLETE CBC W/AUTO DIFF WBC: CPT

## 2023-03-19 PROCEDURE — 99024 POSTOP FOLLOW-UP VISIT: CPT | Performed by: SURGERY

## 2023-03-19 PROCEDURE — 2700000000 HC OXYGEN THERAPY PER DAY

## 2023-03-19 PROCEDURE — 6360000002 HC RX W HCPCS: Performed by: STUDENT IN AN ORGANIZED HEALTH CARE EDUCATION/TRAINING PROGRAM

## 2023-03-19 PROCEDURE — 6370000000 HC RX 637 (ALT 250 FOR IP)

## 2023-03-19 PROCEDURE — 2060000000 HC ICU INTERMEDIATE R&B

## 2023-03-19 PROCEDURE — 6370000000 HC RX 637 (ALT 250 FOR IP): Performed by: INTERNAL MEDICINE

## 2023-03-19 PROCEDURE — 36415 COLL VENOUS BLD VENIPUNCTURE: CPT

## 2023-03-19 PROCEDURE — 6370000000 HC RX 637 (ALT 250 FOR IP): Performed by: STUDENT IN AN ORGANIZED HEALTH CARE EDUCATION/TRAINING PROGRAM

## 2023-03-19 PROCEDURE — 80048 BASIC METABOLIC PNL TOTAL CA: CPT

## 2023-03-19 PROCEDURE — 2580000003 HC RX 258: Performed by: STUDENT IN AN ORGANIZED HEALTH CARE EDUCATION/TRAINING PROGRAM

## 2023-03-19 RX ADMIN — ACETAMINOPHEN 650 MG: 325 TABLET ORAL at 17:03

## 2023-03-19 RX ADMIN — OXYCODONE HYDROCHLORIDE 5 MG: 5 TABLET ORAL at 17:02

## 2023-03-19 RX ADMIN — SODIUM CHLORIDE, PRESERVATIVE FREE 10 ML: 5 INJECTION INTRAVENOUS at 09:51

## 2023-03-19 RX ADMIN — ENOXAPARIN SODIUM 40 MG: 100 INJECTION SUBCUTANEOUS at 09:50

## 2023-03-19 RX ADMIN — SODIUM CHLORIDE, PRESERVATIVE FREE 10 ML: 5 INJECTION INTRAVENOUS at 20:17

## 2023-03-19 RX ADMIN — CYCLOBENZAPRINE HYDROCHLORIDE 5 MG: 5 TABLET, FILM COATED ORAL at 09:55

## 2023-03-19 RX ADMIN — BISACODYL 10 MG: 10 SUPPOSITORY RECTAL at 09:51

## 2023-03-19 RX ADMIN — AMLODIPINE BESYLATE 10 MG: 10 TABLET ORAL at 09:51

## 2023-03-19 RX ADMIN — TAMSULOSIN HYDROCHLORIDE 0.4 MG: 0.4 CAPSULE ORAL at 09:50

## 2023-03-19 ASSESSMENT — PAIN SCALES - GENERAL
PAINLEVEL_OUTOF10: 3
PAINLEVEL_OUTOF10: 0
PAINLEVEL_OUTOF10: 10
PAINLEVEL_OUTOF10: 0
PAINLEVEL_OUTOF10: 2

## 2023-03-19 ASSESSMENT — PAIN DESCRIPTION - LOCATION
LOCATION: NECK
LOCATION: SHOULDER

## 2023-03-19 ASSESSMENT — PAIN DESCRIPTION - DESCRIPTORS
DESCRIPTORS: ACHING;DISCOMFORT;DULL
DESCRIPTORS: ACHING;DISCOMFORT;DULL
DESCRIPTORS: ACHING;SHARP;DISCOMFORT

## 2023-03-19 ASSESSMENT — PAIN - FUNCTIONAL ASSESSMENT: PAIN_FUNCTIONAL_ASSESSMENT: PREVENTS OR INTERFERES SOME ACTIVE ACTIVITIES AND ADLS

## 2023-03-19 ASSESSMENT — PAIN DESCRIPTION - ORIENTATION: ORIENTATION: LEFT

## 2023-03-19 NOTE — PROGRESS NOTES
3/12/23: Surgery following , modified barium swallow , postop ileus resolved. Diet advanced per surgery. Currently on regular diet. Postoperative ileus: resolved   Acute kidney injury, resolved  Hypokalemia   Hypernatremia   history of prostate cancer        Disposition: To rehab pending clinical progression. Hopefully Monday. Medications:  REVIEWED DAILY    Infusion Medications    sodium chloride       Scheduled Medications    bisacodyl  10 mg Rectal Daily    amLODIPine  10 mg Oral Daily    cyclobenzaprine  5 mg Oral BID    tamsulosin  0.4 mg Oral Daily    acetaminophen  650 mg Oral Q6H    sodium chloride flush  10 mL IntraVENous 2 times per day    enoxaparin  40 mg SubCUTAneous Daily     PRN Meds: oxyCODONE, hydrALAZINE, labetalol, HYDROmorphone **OR** HYDROmorphone, sodium chloride flush, sodium chloride, promethazine **OR** ondansetron    Labs:     Recent Labs     03/17/23 2228 03/18/23  0109 03/19/23  0415   WBC 7.3 11.2 6.6   HGB 12.2* 12.3* 11.4*   HCT 40.3 39.6 36.0*    285 353       Recent Labs     03/17/23 2128 03/18/23  0109 03/19/23  0415    141 140   K 3.8 3.8 4.0    108* 106   CO2 28 25 27   BUN 8 8 13   CREATININE 0.7 0.7 0.7   CALCIUM 7.9* 7.7* 8.0*       Recent Labs     03/17/23 2128   PROT 5.4*   ALKPHOS 53   ALT 25   AST 33   BILITOT 0.3       No results for input(s): INR in the last 72 hours. No results for input(s): Fabius Freud in the last 72 hours. Chronic labs:    Lab Results   Component Value Date    INR 1.1 07/13/2022       Radiology: REVIEWED DAILY    +++++++++++++++++++++++++++++++++++++++++++++++++  Emili Hawkins MD  [facility] Physician - [address] [place] [address], [place]  +++++++++++++++++++++++++++++++++++++++++++++++++  NOTE: This report was transcribed using voice recognition software. Every effort was made to ensure accuracy; however, inadvertent computerized transcription errors may be present.

## 2023-03-19 NOTE — PROGRESS NOTES
[facility]   ATTENDING PHYSICIAN PROGRESS NOTE     I have examined the patient, reviewed the record, and discussed the case with the APN/ Resident. I have reviewed all relevant labs and imaging data. Please refer to the APN/ resident's note. I agree with the assessment and plan with the following corrections/ additions. The following summarizes my clinical findings and independent assessment. CC: SBO    Patient states he is tolerating diet without nausea. Passing flatus; having BMs. 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 Every effort was made to ensure accuracy; however, inadvertent computerized transcription errors may be present.

## 2023-03-20 LAB
ANION GAP SERPL CALCULATED.3IONS-SCNC: 8 MMOL/L (ref 7–16)
BASOPHILS # BLD: 0.01 E9/L (ref 0–0.2)
BASOPHILS NFR BLD: 0.2 % (ref 0–2)
BUN SERPL-MCNC: 14 MG/DL (ref 6–23)
CALCIUM SERPL-MCNC: 7.9 MG/DL (ref 8.6–10.2)
CHLORIDE SERPL-SCNC: 106 MMOL/L (ref 98–107)
CO2 SERPL-SCNC: 26 MMOL/L (ref 22–29)
CREAT SERPL-MCNC: 0.6 MG/DL (ref 0.7–1.2)
EOSINOPHIL # BLD: 0.07 E9/L (ref 0.05–0.5)
EOSINOPHIL NFR BLD: 1.3 % (ref 0–6)
ERYTHROCYTE [DISTWIDTH] IN BLOOD BY AUTOMATED COUNT: 16.4 FL (ref 11.5–15)
GLUCOSE SERPL-MCNC: 89 MG/DL (ref 74–99)
HCT VFR BLD AUTO: 36.2 % (ref 37–54)
HGB BLD-MCNC: 11.1 G/DL (ref 12.5–16.5)
IMM GRANULOCYTES # BLD: 0.08 E9/L
IMM GRANULOCYTES NFR BLD: 1.4 % (ref 0–5)
LYMPHOCYTES # BLD: 0.92 E9/L (ref 1.5–4)
LYMPHOCYTES NFR BLD: 16.5 % (ref 20–42)
MCH RBC QN AUTO: 27.1 PG (ref 26–35)
MCHC RBC AUTO-ENTMCNC: 30.7 % (ref 32–34.5)
MCV RBC AUTO: 88.3 FL (ref 80–99.9)
MONOCYTES # BLD: 0.49 E9/L (ref 0.1–0.95)
MONOCYTES NFR BLD: 8.8 % (ref 2–12)
NEUTROPHILS # BLD: 3.99 E9/L (ref 1.8–7.3)
NEUTS SEG NFR BLD: 71.8 % (ref 43–80)
PLATELET # BLD AUTO: 375 E9/L (ref 130–450)
PMV BLD AUTO: 8.5 FL (ref 7–12)
POTASSIUM SERPL-SCNC: 3.9 MMOL/L (ref 3.5–5)
RBC # BLD AUTO: 4.1 E12/L (ref 3.8–5.8)
SODIUM SERPL-SCNC: 140 MMOL/L (ref 132–146)
WBC # BLD: 5.6 E9/L (ref 4.5–11.5)

## 2023-03-20 PROCEDURE — 6360000002 HC RX W HCPCS: Performed by: STUDENT IN AN ORGANIZED HEALTH CARE EDUCATION/TRAINING PROGRAM

## 2023-03-20 PROCEDURE — 6370000000 HC RX 637 (ALT 250 FOR IP): Performed by: STUDENT IN AN ORGANIZED HEALTH CARE EDUCATION/TRAINING PROGRAM

## 2023-03-20 PROCEDURE — 2580000003 HC RX 258: Performed by: STUDENT IN AN ORGANIZED HEALTH CARE EDUCATION/TRAINING PROGRAM

## 2023-03-20 PROCEDURE — 85025 COMPLETE CBC W/AUTO DIFF WBC: CPT

## 2023-03-20 PROCEDURE — 80048 BASIC METABOLIC PNL TOTAL CA: CPT

## 2023-03-20 PROCEDURE — 2700000000 HC OXYGEN THERAPY PER DAY

## 2023-03-20 PROCEDURE — 2060000000 HC ICU INTERMEDIATE R&B

## 2023-03-20 PROCEDURE — 36415 COLL VENOUS BLD VENIPUNCTURE: CPT

## 2023-03-20 PROCEDURE — 6370000000 HC RX 637 (ALT 250 FOR IP)

## 2023-03-20 RX ADMIN — AMLODIPINE BESYLATE 10 MG: 10 TABLET ORAL at 08:20

## 2023-03-20 RX ADMIN — BISACODYL 10 MG: 10 SUPPOSITORY RECTAL at 08:20

## 2023-03-20 RX ADMIN — SODIUM CHLORIDE, PRESERVATIVE FREE 10 ML: 5 INJECTION INTRAVENOUS at 08:20

## 2023-03-20 RX ADMIN — ACETAMINOPHEN 650 MG: 325 TABLET ORAL at 21:35

## 2023-03-20 RX ADMIN — CYCLOBENZAPRINE HYDROCHLORIDE 5 MG: 5 TABLET, FILM COATED ORAL at 08:20

## 2023-03-20 RX ADMIN — CYCLOBENZAPRINE HYDROCHLORIDE 5 MG: 5 TABLET, FILM COATED ORAL at 21:36

## 2023-03-20 RX ADMIN — SODIUM CHLORIDE, PRESERVATIVE FREE 10 ML: 5 INJECTION INTRAVENOUS at 21:36

## 2023-03-20 RX ADMIN — TAMSULOSIN HYDROCHLORIDE 0.4 MG: 0.4 CAPSULE ORAL at 08:20

## 2023-03-20 RX ADMIN — ENOXAPARIN SODIUM 40 MG: 100 INJECTION SUBCUTANEOUS at 08:20

## 2023-03-20 ASSESSMENT — PAIN - FUNCTIONAL ASSESSMENT: PAIN_FUNCTIONAL_ASSESSMENT: PREVENTS OR INTERFERES SOME ACTIVE ACTIVITIES AND ADLS

## 2023-03-20 ASSESSMENT — PAIN SCALES - GENERAL
PAINLEVEL_OUTOF10: 7
PAINLEVEL_OUTOF10: 0

## 2023-03-20 ASSESSMENT — PAIN DESCRIPTION - FREQUENCY: FREQUENCY: CONTINUOUS

## 2023-03-20 ASSESSMENT — PAIN DESCRIPTION - DESCRIPTORS: DESCRIPTORS: ACHING;DISCOMFORT

## 2023-03-20 ASSESSMENT — PAIN DESCRIPTION - LOCATION: LOCATION: ABDOMEN

## 2023-03-20 ASSESSMENT — PAIN DESCRIPTION - ONSET: ONSET: ON-GOING

## 2023-03-20 ASSESSMENT — PAIN DESCRIPTION - PAIN TYPE: TYPE: ACUTE PAIN;SURGICAL PAIN

## 2023-03-20 ASSESSMENT — PAIN DESCRIPTION - ORIENTATION: ORIENTATION: MID

## 2023-03-20 NOTE — PROGRESS NOTES
Tori    Anthropometric Measures:  Height: 5' 8\" (172.7 cm)  Ideal Body Weight (IBW): 154 lbs (70 kg)    Admission Body Weight: 126 lb (57.2 kg) (bed 3/10)  Current Body Weight: 126 lb (57.2 kg) (bed 3/10),   IBW. Weight Source: Bed Scale  Current BMI (kg/m2): 19.2  Usual Body Weight: 127 lb (57.6 kg) (stand scale 7/13/22 per EMr)  % Weight Change (Calculated): -0.8                    BMI Categories: Underweight (BMI less than 22) age over 72    Estimated Daily Nutrient Needs:  Energy Requirements Based On: Kcal/kg  Weight Used for Energy Requirements: Current  Energy (kcal/day): 30-35kcal/kg CBW=   Weight Used for Protein Requirements: Current  Protein (g/day): 1.5-1.8gm/kg CBW=   Method Used for Fluid Requirements: 1 ml/kcal  Fluid (ml/day):     Nutrition Diagnosis:   Severe malnutrition, In context of chronic illness related to inadequate protein-energy intake (2/2 chronic poor appetite w/ prostate CA hx) as evidenced by Criteria as identified in malnutrition assessment    Nutrition Interventions:   Food and/or Nutrient Delivery: Continue Current Diet, Modify Tube Feeding (Continue Diet. Will Modify Current ONS and monitor. If unable to tolerate PO diet, would then recommend for PN support. Please consult for updated rec's if PN needed.)  Nutrition Education/Counseling: Education not indicated  Coordination of Nutrition Care: Continue to monitor while inpatient       Goals:  Previous Goal Met: Progressing toward Goal(s)  Goals: PO intake 50% or greater, by next RD assessment       Nutrition Monitoring and Evaluation:   Behavioral-Environmental Outcomes: None Identified  Food/Nutrient Intake Outcomes: Diet Advancement/Tolerance, Food and Nutrient Intake, Supplement Intake  Physical Signs/Symptoms Outcomes: Biochemical Data, Constipation, GI Status, Nausea or Vomiting, Fluid Status or Edema, Nutrition Focused Physical Findings, Skin, Weight    Discharge Planning:     Too soon to determine Karen Isabel, VALERIE, LD  Contact: ext [phone]

## 2023-03-20 NOTE — CARE COORDINATION
Bed available at [facility] today if stable for discharge. Updated attending. Pasrr and ambulance on soft chart. Spoke with patient and sister, they are both in agreement with [facility] at discharge. For questions I can be reached at [phone].  Sidra Dave [place]

## 2023-03-21 ENCOUNTER — APPOINTMENT (OUTPATIENT)
Dept: GENERAL RADIOLOGY | Age: 68
DRG: 356 | End: 2023-03-21
Payer: OTHER GOVERNMENT

## 2023-03-21 LAB
ALBUMIN SERPL-MCNC: 2.5 G/DL (ref 3.5–5.2)
ALP SERPL-CCNC: 49 U/L (ref 40–129)
ALT SERPL-CCNC: 26 U/L (ref 0–40)
ANION GAP SERPL CALCULATED.3IONS-SCNC: 7 MMOL/L (ref 7–16)
AST SERPL-CCNC: 21 U/L (ref 0–39)
BASOPHILS # BLD: 0.02 E9/L (ref 0–0.2)
BASOPHILS NFR BLD: 0.4 % (ref 0–2)
BILIRUB DIRECT SERPL-MCNC: <0.2 MG/DL (ref 0–0.3)
BILIRUB INDIRECT SERPL-MCNC: ABNORMAL MG/DL (ref 0–1)
BILIRUB SERPL-MCNC: 0.3 MG/DL (ref 0–1.2)
BUN SERPL-MCNC: 12 MG/DL (ref 6–23)
CALCIUM SERPL-MCNC: 8 MG/DL (ref 8.6–10.2)
CHLORIDE SERPL-SCNC: 104 MMOL/L (ref 98–107)
CO2 SERPL-SCNC: 27 MMOL/L (ref 22–29)
CREAT SERPL-MCNC: 0.6 MG/DL (ref 0.7–1.2)
EOSINOPHIL # BLD: 0.08 E9/L (ref 0.05–0.5)
EOSINOPHIL NFR BLD: 1.8 % (ref 0–6)
ERYTHROCYTE [DISTWIDTH] IN BLOOD BY AUTOMATED COUNT: 16.2 FL (ref 11.5–15)
GLUCOSE SERPL-MCNC: 91 MG/DL (ref 74–99)
HCT VFR BLD AUTO: 35.6 % (ref 37–54)
HGB BLD-MCNC: 11.4 G/DL (ref 12.5–16.5)
IMM GRANULOCYTES # BLD: 0.06 E9/L
IMM GRANULOCYTES NFR BLD: 1.3 % (ref 0–5)
LIPASE: 42 U/L (ref 13–60)
LYMPHOCYTES # BLD: 0.78 E9/L (ref 1.5–4)
LYMPHOCYTES NFR BLD: 17.2 % (ref 20–42)
MAGNESIUM SERPL-MCNC: 1.8 MG/DL (ref 1.6–2.6)
MCH RBC QN AUTO: 27.2 PG (ref 26–35)
MCHC RBC AUTO-ENTMCNC: 32 % (ref 32–34.5)
MCV RBC AUTO: 85 FL (ref 80–99.9)
MONOCYTES # BLD: 0.38 E9/L (ref 0.1–0.95)
MONOCYTES NFR BLD: 8.4 % (ref 2–12)
NEUTROPHILS # BLD: 3.21 E9/L (ref 1.8–7.3)
NEUTS SEG NFR BLD: 70.9 % (ref 43–80)
PHOSPHATE SERPL-MCNC: 3.1 MG/DL (ref 2.5–4.5)
PLATELET # BLD AUTO: 439 E9/L (ref 130–450)
PMV BLD AUTO: 8.7 FL (ref 7–12)
POTASSIUM SERPL-SCNC: 3.4 MMOL/L (ref 3.5–5)
PROT SERPL-MCNC: 5 G/DL (ref 6.4–8.3)
RBC # BLD AUTO: 4.19 E12/L (ref 3.8–5.8)
SODIUM SERPL-SCNC: 138 MMOL/L (ref 132–146)
WBC # BLD: 4.5 E9/L (ref 4.5–11.5)

## 2023-03-21 PROCEDURE — 36415 COLL VENOUS BLD VENIPUNCTURE: CPT

## 2023-03-21 PROCEDURE — 97530 THERAPEUTIC ACTIVITIES: CPT

## 2023-03-21 PROCEDURE — 94150 VITAL CAPACITY TEST: CPT

## 2023-03-21 PROCEDURE — 6370000000 HC RX 637 (ALT 250 FOR IP): Performed by: INTERNAL MEDICINE

## 2023-03-21 PROCEDURE — 6370000000 HC RX 637 (ALT 250 FOR IP): Performed by: STUDENT IN AN ORGANIZED HEALTH CARE EDUCATION/TRAINING PROGRAM

## 2023-03-21 PROCEDURE — 94640 AIRWAY INHALATION TREATMENT: CPT

## 2023-03-21 PROCEDURE — 84100 ASSAY OF PHOSPHORUS: CPT

## 2023-03-21 PROCEDURE — 71045 X-RAY EXAM CHEST 1 VIEW: CPT

## 2023-03-21 PROCEDURE — 6360000002 HC RX W HCPCS: Performed by: INTERNAL MEDICINE

## 2023-03-21 PROCEDURE — 2580000003 HC RX 258: Performed by: STUDENT IN AN ORGANIZED HEALTH CARE EDUCATION/TRAINING PROGRAM

## 2023-03-21 PROCEDURE — 97535 SELF CARE MNGMENT TRAINING: CPT

## 2023-03-21 PROCEDURE — 83690 ASSAY OF LIPASE: CPT

## 2023-03-21 PROCEDURE — 94664 DEMO&/EVAL PT USE INHALER: CPT

## 2023-03-21 PROCEDURE — 85025 COMPLETE CBC W/AUTO DIFF WBC: CPT

## 2023-03-21 PROCEDURE — 80048 BASIC METABOLIC PNL TOTAL CA: CPT

## 2023-03-21 PROCEDURE — 83735 ASSAY OF MAGNESIUM: CPT

## 2023-03-21 PROCEDURE — 2700000000 HC OXYGEN THERAPY PER DAY

## 2023-03-21 PROCEDURE — 2060000000 HC ICU INTERMEDIATE R&B

## 2023-03-21 PROCEDURE — 6360000002 HC RX W HCPCS: Performed by: STUDENT IN AN ORGANIZED HEALTH CARE EDUCATION/TRAINING PROGRAM

## 2023-03-21 PROCEDURE — 74018 RADEX ABDOMEN 1 VIEW: CPT

## 2023-03-21 PROCEDURE — 80076 HEPATIC FUNCTION PANEL: CPT

## 2023-03-21 RX ORDER — POTASSIUM CHLORIDE 7.45 MG/ML
10 INJECTION INTRAVENOUS
Status: COMPLETED | OUTPATIENT
Start: 2023-03-21 | End: 2023-03-22

## 2023-03-21 RX ORDER — MAGNESIUM SULFATE 1 G/100ML
1000 INJECTION INTRAVENOUS ONCE
Status: COMPLETED | OUTPATIENT
Start: 2023-03-21 | End: 2023-03-21

## 2023-03-21 RX ORDER — IPRATROPIUM BROMIDE AND ALBUTEROL SULFATE 2.5; .5 MG/3ML; MG/3ML
1 SOLUTION RESPIRATORY (INHALATION)
Status: DISCONTINUED | OUTPATIENT
Start: 2023-03-21 | End: 2023-03-23 | Stop reason: HOSPADM

## 2023-03-21 RX ADMIN — SODIUM CHLORIDE, PRESERVATIVE FREE 10 ML: 5 INJECTION INTRAVENOUS at 21:17

## 2023-03-21 RX ADMIN — ACETAMINOPHEN 650 MG: 325 TABLET ORAL at 17:54

## 2023-03-21 RX ADMIN — AMLODIPINE BESYLATE 10 MG: 10 TABLET ORAL at 10:13

## 2023-03-21 RX ADMIN — IPRATROPIUM BROMIDE AND ALBUTEROL SULFATE 1 AMPULE: .5; 2.5 SOLUTION RESPIRATORY (INHALATION) at 09:33

## 2023-03-21 RX ADMIN — CYCLOBENZAPRINE HYDROCHLORIDE 5 MG: 5 TABLET, FILM COATED ORAL at 10:13

## 2023-03-21 RX ADMIN — POTASSIUM CHLORIDE 10 MEQ: 7.46 INJECTION, SOLUTION INTRAVENOUS at 22:21

## 2023-03-21 RX ADMIN — MAGNESIUM SULFATE IN DEXTROSE 1000 MG: 10 INJECTION, SOLUTION INTRAVENOUS at 21:17

## 2023-03-21 RX ADMIN — ACETAMINOPHEN 650 MG: 325 TABLET ORAL at 05:22

## 2023-03-21 RX ADMIN — CYCLOBENZAPRINE HYDROCHLORIDE 5 MG: 5 TABLET, FILM COATED ORAL at 21:16

## 2023-03-21 RX ADMIN — SODIUM CHLORIDE, PRESERVATIVE FREE 10 ML: 5 INJECTION INTRAVENOUS at 10:12

## 2023-03-21 RX ADMIN — IPRATROPIUM BROMIDE AND ALBUTEROL SULFATE 1 AMPULE: .5; 2.5 SOLUTION RESPIRATORY (INHALATION) at 14:19

## 2023-03-21 RX ADMIN — ACETAMINOPHEN 650 MG: 325 TABLET ORAL at 10:13

## 2023-03-21 RX ADMIN — TAMSULOSIN HYDROCHLORIDE 0.4 MG: 0.4 CAPSULE ORAL at 10:13

## 2023-03-21 RX ADMIN — ENOXAPARIN SODIUM 40 MG: 100 INJECTION SUBCUTANEOUS at 10:12

## 2023-03-21 ASSESSMENT — PAIN SCALES - GENERAL
PAINLEVEL_OUTOF10: 0
PAINLEVEL_OUTOF10: 7
PAINLEVEL_OUTOF10: 7

## 2023-03-21 ASSESSMENT — PAIN DESCRIPTION - LOCATION: LOCATION: ABDOMEN

## 2023-03-21 NOTE — DISCHARGE SUMMARY
drinking straws; Breakfast, Dinner- Wound healing oral supplement; Breakfast/lunch/dinner: high protein oral Supplement    Follow-up:    This patient is instructed to follow-up with her primary care physician. Patient is instructed to follow-up with the consults listed above as directed by them. They are instructed to resume home medications and take new medications as indicated in the list above. If the patient has a recurrence of symptoms, they are instructed to go to the ED. Preparing for this patient's discharge, including paperwork, orders, instructions, and meeting with patient did require > 30 minutes.     John Pleitez MD   5:41 PM  3/21/2023

## 2023-03-21 NOTE — CARE COORDINATION
Message sent to attending regarding possible discharge today. She wanted to check patient SOB after nebulizer treatments today. [facility] following, bed available when stable. Pasrr and ambulance on soft chart. Patient and sister both remained in agreement with [facility] yesterday when I spoke with them. For questions I can be reached at [phone].  Armando Riley, [place]

## 2023-03-21 NOTE — PROGRESS NOTES
Cognition: A & O x 3, pleasant & cooperative, motivated   Able to Follow Multi-Step Commands               Memory:  fair               Sequencing:  fair               Problem solving:  fair               Judgement/safety:  fair                 Functional Assessment:  AM-PAC Daily Activity Raw Score: 12/24       Initial Eval Status  Date: 3-13-23    Treatment Status  Date:  3-21-23 STGs = LTGs  Time frame: 10-14 days   Feeding NPO currently     TBA    Set up  Upright in bed, progress to meals in chair     SUP/Set up   Grooming Mod     Able to wash hands/face w/ cloth, digits fixed in flexion limiting fine-motor skills for oral care and hair care  Unable to tolerate ambulating to or standing at sink    Min A   For thoroughness, upright in bed able to wash off her face  Min A  Seated/Standing at the [employer]   UB Dressing Mod A     Simulated - seated EOB - limited endurance, UE ROM    Mod A   Doff/arleen gown upright in bed, limited by weakness & decreased tolerance  Min A      LB Dressing Dep     Max A to don socks seated EOB, unable to stand for clothing adjustment over hips - assist of 1-2 simulated - bed level    Dep   To don socks at bed-level Mod A      Bathing NT     Pt ed re: Benefits of use of Shower chair/Tub Bench    Max A   Completed full sponge bathing tasks bed level for energy conservation techniques, pt able to assist with UB otherwise max assist  Mod A       Toileting Dep     External Catheter  Assist of 2 - simulated bed level    Dep  Extensive posterior hygiene care & pericare at bed-level d/t BM incontinence. Mod A      Bed Mobility  Supine to sit: Max A   Sit to supine:  Max A      VCs for safety, hand placement, use of side-rail     Log Roll:  Mod A Supine to sit: Min A  Sit to supine: Min A      Functional Transfers NT     Unable to tolerate attempt to stand d/t fatigue/lethargy/general weakness, limited endurance    Mod A x 2  Sit < > stand    Mod A      Functional Mobility NT       Mod A x 2  Stand

## 2023-03-21 NOTE — PROGRESS NOTES
Physical Therapy  Physical Therapy Treatment note     Name: Christine Ceron  : 1955  MRN: [id number]      Date of Service: 3/21/2023    Evaluating PT:  Ladan Parks PT, DPT [id number]    Room #:  0499/9606-L  Diagnosis:  SBO (small bowel obstruction) ([facility] [U48.969]  NURIA (acute kidney injury) ([facility] [N17.9]  Acute cystitis with hematuria [N30.01]  PMHx/PSHx:   has a past medical history of Cancer ([facility] has a past surgical history that includes Skin graft; Hemorrhoid surgery; cervical fusion (N/A, 2022); cervical fusion (N/A, 2022); Throat surgery (N/A, 2022); Stomach surgery (N/A, 2022); back surgery; Abdomen surgery; and laparotomy (N/A, 3/12/2023). Procedure/Surgery:  Exploratory Laparotomy, Reduction of Internal Hernia (3/12/2023)  Precautions:  Falls, cognition, , abdominal precautions, O2, external catheter, incontinent   Equipment Needs:  TBD    SUBJECTIVE:    Pt is a questionable historian. Pt lives with sister in a 2 story home with 4 stair(s) to enter and 0 rail(s). Pt has been staying on 1st floor. Pt ambulated with rollator PTA. OBJECTIVE:   Initial Evaluation  Date: 3/14/2023 Treatment  Date: 3/21/23 Short Term/ Long Term   Goals   AM-PAC 6 Clicks  40/    Was pt agreeable to Eval/treatment? Yes Yes     Does pt have pain?  /10 abdomen abdominal pain, does not rate    Bed Mobility  Rolling: Bob  Supine to sit: ModA  Sit to supine: NT  Scooting: NT Rolling: SBA  Supine to sit: ModA  Sit to supine: ModA  Scooting: ModA Rolling: Supervision  Supine to sit: SBA  Sit to supine: SBA  Scooting: Supervision   Transfers Sit to stand: NT  Stand to sit: NT  Stand pivot: NT Sit to stand: ModA x2  Stand to sit: ModA x2  Stand pivot: ModA x2 Sit to stand: SBA  Stand to sit: SBA  Stand pivot: SBA with AAD   Ambulation    NT  feet with AAD SBA   Stair negotiation: ascended and descended  NT NT 4 step(s) with 0 rail(s) +AAD SBA   ROM BUE:  See OT note  BLE:  [facility] minutes    Zac Barry PT, DPT  [id number]

## 2023-03-21 NOTE — PROGRESS NOTES
Hospitalist Progress Note      SYNOPSIS: Patient admitted on 3/10/2023 for SBO (small bowel obstruction) ([facility]      SUBJECTIVE:    Patient seen and examined. Patient mentioned that he is passing gas today tolerating reg diet- still has some abd pain and episodes with difficulty breathing. Records reviewed. 44-year-old male past medical history of cervical myopathy, cervical spinal stenosis, prostate cancer presented to small bowel obstruction and acute kidney injury. He was seen by general surgery started on IV fluids for acute kidney injury. His status post exploratory laparotomy and reduction of internal hernia on 3/12/2023 developed postoperative ileus. Subjective :   Patient seen and examined. Patient mentioned that he is passing gas today tolerating reg diet- still has some abd pain and episodes with difficulty breathing. Records reviewed. Temp (24hrs), Av.8 °F (37.1 °C), Min:98.4 °F (36.9 °C), Max:99.1 °F (37.3 °C)    DIET: ADULT DIET; Regular; No Drinking Straws  ADULT ORAL NUTRITION SUPPLEMENT; Breakfast, Lunch, Dinner; Standard High Calorie/High Protein Oral Supplement  ADULT ORAL NUTRITION SUPPLEMENT; Breakfast, Dinner; Wound Healing Oral Supplement  CODE: Full Code    Intake/Output Summary (Last 24 hours) at 3/21/2023 0051  Last data filed at 3/20/2023 2230  Gross per 24 hour   Intake 780 ml   Output 1150 ml   Net -370 ml         OBJECTIVE:    BP (!) 141/96   Pulse 88   Temp 98.4 °F (36.9 °C) (Oral)   Resp 18   Ht 5' 8\" (1.727 m)   Wt 126 lb 6.4 oz (57.3 kg)   SpO2 97%   BMI 19.22 kg/m²     General appearance: No apparent distress, appears stated age and cooperative. HEENT:  Conjunctivae/corneas clear. Neck: Supple. No jugular venous distention.    Respiratory: slightly diminished breath sounds (b/l)   Cardiovascular: Regular rate rhythm, normal S1-S2  Abdomen: Soft, nontender, nondistended  Musculoskeletal: No clubbing, cyanosis, no bilateral lower extremity DAILY    +++++++++++++++++++++++++++++++++++++++++++++++++  Meena John MD  [facility] Physician - Hospitalist  [address], [place]  +++++++++++++++++++++++++++++++++++++++++++++++++  NOTE: This report was transcribed using voice recognition software. Every effort was made to ensure accuracy; however, inadvertent computerized transcription errors may be present.

## 2023-03-22 VITALS
SYSTOLIC BLOOD PRESSURE: 118 MMHG | HEIGHT: 68 IN | WEIGHT: 126.4 LBS | DIASTOLIC BLOOD PRESSURE: 90 MMHG | BODY MASS INDEX: 19.16 KG/M2 | TEMPERATURE: 97.9 F | OXYGEN SATURATION: 98 % | RESPIRATION RATE: 18 BRPM | HEART RATE: 90 BPM

## 2023-03-22 LAB
ANION GAP SERPL CALCULATED.3IONS-SCNC: 5 MMOL/L (ref 7–16)
BASOPHILS # BLD: 0.02 E9/L (ref 0–0.2)
BASOPHILS NFR BLD: 0.5 % (ref 0–2)
BUN SERPL-MCNC: 10 MG/DL (ref 6–23)
CALCIUM SERPL-MCNC: 7.9 MG/DL (ref 8.6–10.2)
CHLORIDE SERPL-SCNC: 105 MMOL/L (ref 98–107)
CO2 SERPL-SCNC: 27 MMOL/L (ref 22–29)
CREAT SERPL-MCNC: 0.6 MG/DL (ref 0.7–1.2)
EOSINOPHIL # BLD: 0.06 E9/L (ref 0.05–0.5)
EOSINOPHIL NFR BLD: 1.4 % (ref 0–6)
ERYTHROCYTE [DISTWIDTH] IN BLOOD BY AUTOMATED COUNT: 16.4 FL (ref 11.5–15)
GLUCOSE SERPL-MCNC: 89 MG/DL (ref 74–99)
HCT VFR BLD AUTO: 35.7 % (ref 37–54)
HGB BLD-MCNC: 11.2 G/DL (ref 12.5–16.5)
IMM GRANULOCYTES # BLD: 0.04 E9/L
IMM GRANULOCYTES NFR BLD: 0.9 % (ref 0–5)
LYMPHOCYTES # BLD: 0.79 E9/L (ref 1.5–4)
LYMPHOCYTES NFR BLD: 18.5 % (ref 20–42)
MAGNESIUM SERPL-MCNC: 2 MG/DL (ref 1.6–2.6)
MCH RBC QN AUTO: 26.9 PG (ref 26–35)
MCHC RBC AUTO-ENTMCNC: 31.4 % (ref 32–34.5)
MCV RBC AUTO: 85.6 FL (ref 80–99.9)
MONOCYTES # BLD: 0.34 E9/L (ref 0.1–0.95)
MONOCYTES NFR BLD: 8 % (ref 2–12)
NEUTROPHILS # BLD: 3.01 E9/L (ref 1.8–7.3)
NEUTS SEG NFR BLD: 70.7 % (ref 43–80)
PHOSPHATE SERPL-MCNC: 3.1 MG/DL (ref 2.5–4.5)
PLATELET # BLD AUTO: 487 E9/L (ref 130–450)
PMV BLD AUTO: 8.6 FL (ref 7–12)
POTASSIUM SERPL-SCNC: 3.8 MMOL/L (ref 3.5–5)
RBC # BLD AUTO: 4.17 E12/L (ref 3.8–5.8)
SARS-COV-2 RDRP RESP QL NAA+PROBE: NOT DETECTED
SODIUM SERPL-SCNC: 137 MMOL/L (ref 132–146)
WBC # BLD: 4.3 E9/L (ref 4.5–11.5)

## 2023-03-22 PROCEDURE — 6370000000 HC RX 637 (ALT 250 FOR IP)

## 2023-03-22 PROCEDURE — 2580000003 HC RX 258: Performed by: STUDENT IN AN ORGANIZED HEALTH CARE EDUCATION/TRAINING PROGRAM

## 2023-03-22 PROCEDURE — 85025 COMPLETE CBC W/AUTO DIFF WBC: CPT

## 2023-03-22 PROCEDURE — 84100 ASSAY OF PHOSPHORUS: CPT

## 2023-03-22 PROCEDURE — 87635 SARS-COV-2 COVID-19 AMP PRB: CPT

## 2023-03-22 PROCEDURE — 6370000000 HC RX 637 (ALT 250 FOR IP): Performed by: INTERNAL MEDICINE

## 2023-03-22 PROCEDURE — 2700000000 HC OXYGEN THERAPY PER DAY

## 2023-03-22 PROCEDURE — 6360000002 HC RX W HCPCS: Performed by: STUDENT IN AN ORGANIZED HEALTH CARE EDUCATION/TRAINING PROGRAM

## 2023-03-22 PROCEDURE — 36415 COLL VENOUS BLD VENIPUNCTURE: CPT

## 2023-03-22 PROCEDURE — 83735 ASSAY OF MAGNESIUM: CPT

## 2023-03-22 PROCEDURE — 6360000002 HC RX W HCPCS: Performed by: INTERNAL MEDICINE

## 2023-03-22 PROCEDURE — 80048 BASIC METABOLIC PNL TOTAL CA: CPT

## 2023-03-22 PROCEDURE — 94640 AIRWAY INHALATION TREATMENT: CPT

## 2023-03-22 PROCEDURE — 6370000000 HC RX 637 (ALT 250 FOR IP): Performed by: STUDENT IN AN ORGANIZED HEALTH CARE EDUCATION/TRAINING PROGRAM

## 2023-03-22 RX ORDER — IPRATROPIUM BROMIDE AND ALBUTEROL SULFATE 2.5; .5 MG/3ML; MG/3ML
3 SOLUTION RESPIRATORY (INHALATION)
Qty: 90 ML | Refills: 0 | Status: CANCELLED
Start: 2023-03-22 | End: 2023-04-01

## 2023-03-22 RX ORDER — ALBUTEROL SULFATE 90 UG/1
2 AEROSOL, METERED RESPIRATORY (INHALATION) 4 TIMES DAILY PRN
Qty: 54 G | Refills: 1
Start: 2023-03-22

## 2023-03-22 RX ORDER — BISACODYL 10 MG
10 SUPPOSITORY, RECTAL RECTAL DAILY
Qty: 30 SUPPOSITORY | Refills: 0
Start: 2023-03-23 | End: 2023-04-22

## 2023-03-22 RX ORDER — OXYCODONE HYDROCHLORIDE 5 MG/1
5 TABLET ORAL EVERY 6 HOURS PRN
Qty: 20 TABLET | Refills: 0 | Status: SHIPPED | OUTPATIENT
Start: 2023-03-22 | End: 2023-03-27

## 2023-03-22 RX ADMIN — TAMSULOSIN HYDROCHLORIDE 0.4 MG: 0.4 CAPSULE ORAL at 09:47

## 2023-03-22 RX ADMIN — IPRATROPIUM BROMIDE AND ALBUTEROL SULFATE 1 AMPULE: .5; 2.5 SOLUTION RESPIRATORY (INHALATION) at 20:08

## 2023-03-22 RX ADMIN — POTASSIUM CHLORIDE 10 MEQ: 7.46 INJECTION, SOLUTION INTRAVENOUS at 00:32

## 2023-03-22 RX ADMIN — ENOXAPARIN SODIUM 40 MG: 100 INJECTION SUBCUTANEOUS at 09:47

## 2023-03-22 RX ADMIN — BISACODYL 10 MG: 10 SUPPOSITORY RECTAL at 09:47

## 2023-03-22 RX ADMIN — IPRATROPIUM BROMIDE AND ALBUTEROL SULFATE 1 AMPULE: .5; 2.5 SOLUTION RESPIRATORY (INHALATION) at 12:37

## 2023-03-22 RX ADMIN — AMLODIPINE BESYLATE 10 MG: 10 TABLET ORAL at 09:47

## 2023-03-22 RX ADMIN — CYCLOBENZAPRINE HYDROCHLORIDE 5 MG: 5 TABLET, FILM COATED ORAL at 20:23

## 2023-03-22 RX ADMIN — IPRATROPIUM BROMIDE AND ALBUTEROL SULFATE 1 AMPULE: .5; 2.5 SOLUTION RESPIRATORY (INHALATION) at 09:00

## 2023-03-22 RX ADMIN — ACETAMINOPHEN 650 MG: 325 TABLET ORAL at 09:48

## 2023-03-22 RX ADMIN — ACETAMINOPHEN 650 MG: 325 TABLET ORAL at 20:49

## 2023-03-22 RX ADMIN — SODIUM CHLORIDE, PRESERVATIVE FREE 10 ML: 5 INJECTION INTRAVENOUS at 09:47

## 2023-03-22 RX ADMIN — CYCLOBENZAPRINE HYDROCHLORIDE 5 MG: 5 TABLET, FILM COATED ORAL at 09:47

## 2023-03-22 ASSESSMENT — PAIN DESCRIPTION - LOCATION
LOCATION: ABDOMEN
LOCATION: ABDOMEN

## 2023-03-22 ASSESSMENT — PAIN SCALES - GENERAL
PAINLEVEL_OUTOF10: 5
PAINLEVEL_OUTOF10: 4

## 2023-03-22 ASSESSMENT — PAIN DESCRIPTION - ORIENTATION: ORIENTATION: RIGHT;LEFT;LOWER

## 2023-03-22 ASSESSMENT — PAIN DESCRIPTION - DESCRIPTORS: DESCRIPTORS: DISCOMFORT;SORE;TENDER

## 2023-03-22 NOTE — PROGRESS NOTES
07/13/2022       Radiology: REVIEWED DAILY    +++++++++++++++++++++++++++++++++++++++++++++++++  Alissa Buitrago MD  [facility] [place] Physician - [address] [place] [address]  +++++++++++++++++++++++++++++++++++++++++++++++++  NOTE: This report was transcribed using voice recognition software. Every effort was made to ensure accuracy; however, inadvertent computerized transcription errors may be present.

## 2023-03-22 NOTE — DISCHARGE INSTRUCTIONS
Call the office if you have any worsening abdominal pain, or your incision become red and tender. Please call for you appointment. Staples typically removed 10-14 days after your surgery.
Pt states " I am having carpal tunnel and ulnar nerve surgery RIGHT Side."

## 2023-03-22 NOTE — CARE COORDINATION
Care Coordination  Per the physician the patient was medically stable to be discharged to go to [facility] today . Still awaiting the discharge order. He is set up to be picked up at 6 pm this evening by [facility] ambulance . Will need a covid test done prior to his discharge. Pasrr and ambulance on soft chart. Will await a discharge order. He family was notified.

## 2023-03-22 NOTE — PROGRESS NOTES
GENERAL SURGERY  DAILY PROGRESS NOTE  3/22/2023    CHIEF COMPLAINT:  Chief Complaint   Patient presents with    Bloated     Distended abd, emesis, no bm for 1 wk       SUBJECTIVE:  Patient denies any abdominal pain. Without nausea or emesis. Tolerating regular diet. Per patient and nursing patient had large bowel movement this morning. OBJECTIVE:  BP (!) 118/90   Pulse 90   Temp 97.9 °F (36.6 °C) (Temporal)   Resp 18   Ht 5' 8\" (1.727 m)   Wt 126 lb 6.4 oz (57.3 kg)   SpO2 98%   BMI 19.22 kg/m²     GENERAL: Comfortable, alert  LUNGS: Nonlabored breathing on 2 L nasal cannula  CARDIOVASCULAR: RR  ABDOMEN: Postsurgical abdomen, midline incision well approximated, no drainage or erythema. Somewhat firm, nondistended, appropriately tender.     ASSESSMENT/PLAN:  75 y.o. male status post exploratory laparotomy for reduction of internal hernia followed by resolved postoperative ileus.    -Patient's most recent KUB was reviewed  -Exam and labs relatively unremarkable  -Normal bowel function  -Okay to discharge from a surgical standpoint    To be discussed with Dr. Melia Schuster, DO  Surgery Resident PGY-1  3/22/2023  10:08 AM

## 2023-03-22 NOTE — DISCHARGE INSTR - COC
Continuity of Care Form    Patient Name: Stella Cherry   :  1955  MRN:  [id number]    Admit date:  3/10/2023  Discharge date:  2023    Code Status Order: Full Code   Advance Directives:   Advance Care Flowsheet Documentation       Date/Time Healthcare Directive Type of Healthcare Directive Copy in [address] Agent's Name Healthcare Agent's Phone Number    23 0532 No, patient does not have an advance directive for healthcare treatment -- -- -- -- --            Admitting Physician:  Ting Crowell DO  PCP: Priya Nava DO    Discharging Nurse: [facility] Unit/Room#: 3302/8360-W  Discharging Unit Phone Number: [phone]    Emergency Contact:   Extended Emergency Contact Information  Primary Emergency Contact: [address]Caren Barboza  Phone: [phone]  Mobile Phone: [phone]  Relation: Other  Secondary Emergency Contact: Thien Donovan  Mobile Phone: [phone]  Relation: Brother/Sister   needed? No    Past Surgical History:  Past Surgical History:   Procedure Laterality Date    ABDOMEN SURGERY      BACK SURGERY      CERVICAL FUSION N/A 2022    ANTERIOR C3-C4, C4-C5,  AND C5-C6 CERVICAL DISCECTOMY AND FUSION performed by Gildardo Acuña MD at [address] [place] [address] 2022    POSTERIOR C3-C7 LAMINECTOMY, POSTERIOR C3-T1 FUSION performed by Gildardo Acuña MD at [place] [address] N/A 3/12/2023    Exploratory Laparotomy, Reduction of Internal Hernia performed by Maame Le MD at [address] [place] [address] N/A 2022    LAPAROSCOPIC POSSIBLE OPEN PLACEMENT OF GASTROSTOMY TUBE WANTS AS EARLY AS POSSIBLE TO FOLLOW OTHER ADDONS performed by Chris Angulo MD at [address] [facility] N/A 2022    PHARYNGEAL TEAR REPAIR performed by Gildardo Acuña MD at [address] [place] [address]       Immunization History:      There is no immunization history on file for this (Active)   Number of days: 247       Incision 07/18/22 Neck Posterior (Active)   Number of days: 247       Incision 07/22/22 Abdomen Medial (Active)   Number of days: 243       Incision 03/12/23 Abdomen Medial (Active)   Dressing Status Other (Comment) 03/21/23 0915   Incision Cleansed Cleansed with saline 03/17/23 1630   Dressing/Treatment Open to air 03/19/23 0900   Closure Staples 03/21/23 0915   Margins Approximated 03/21/23 0915   Incision Assessment Dry 03/21/23 0915   Drainage Amount None 03/21/23 0915   Drainage Description Sanguinous 03/12/23 1400   Mariely-incision Assessment Intact 03/19/23 0900   Number of days: 10        Elimination:  Continence: Bowel: Yes  Bladder: Yes  Urinary Catheter: None   Colostomy/Ileostomy/Ileal Conduit: No       Date of Last BM: 03/22/2023    Intake/Output Summary (Last 24 hours) at 3/22/2023 1549  Last data filed at 3/22/2023 0948  Gross per 24 hour   Intake 160 ml   Output 400 ml   Net -240 ml     I/O last 3 completed shifts: In: 36 [P.O.:720; I.V.:100]  Out: 750 [Urine:750]    Safety Concerns: At Risk for Falls    Impairments/Disabilities:      None    Nutrition Therapy:  Current Nutrition Therapy:   - Oral Diet:  General  - Oral Nutrition Supplement:  Standard  three times a day    Routes of Feeding: Oral  Liquids: Thin liquids - no drinking straws  Daily Fluid Restriction: no  Last Modified Barium Swallow with Video (Video Swallowing Test): done on 03/17/2023    Treatments at the Time of Hospital Discharge:   Respiratory Treatments:   Oxygen Therapy:  is on oxygen at 2 L/min per nasal cannula. Ventilator:    - No ventilator support    Rehab Therapies: Physical Therapy and Occupational Therapy  Weight Bearing Status/Restrictions: No weight bearing restrictions  Other Medical Equipment (for information only, NOT a DME order):     Other Treatments:     Patient's personal belongings (please select all that are sent with patient):  None    RN SIGNATURE:  Electronically

## 2023-04-26 ENCOUNTER — OFFICE VISIT (OUTPATIENT)
Dept: SURGERY | Age: 68
End: 2023-04-26
Payer: OTHER GOVERNMENT

## 2023-04-26 VITALS
SYSTOLIC BLOOD PRESSURE: 115 MMHG | HEART RATE: 107 BPM | OXYGEN SATURATION: 96 % | DIASTOLIC BLOOD PRESSURE: 79 MMHG | TEMPERATURE: 98.2 F | RESPIRATION RATE: 16 BRPM

## 2023-04-26 DIAGNOSIS — K56.609 SMALL BOWEL OBSTRUCTION (HCC): Primary | ICD-10-CM

## 2023-04-26 PROCEDURE — 99024 POSTOP FOLLOW-UP VISIT: CPT | Performed by: SURGERY

## 2023-04-26 PROCEDURE — 99212 OFFICE O/P EST SF 10 MIN: CPT | Performed by: SURGERY

## 2023-04-26 PROCEDURE — 99212 OFFICE O/P EST SF 10 MIN: CPT

## 2023-04-26 RX ORDER — ACETAMINOPHEN 325 MG/1
650 TABLET ORAL EVERY 6 HOURS PRN
COMMUNITY

## 2023-04-26 RX ORDER — SODIUM PHOSPHATE, DIBASIC AND SODIUM PHOSPHATE, MONOBASIC 7; 19 G/133ML; G/133ML
1 ENEMA RECTAL
COMMUNITY

## 2023-04-26 RX ORDER — OXYCODONE HYDROCHLORIDE 5 MG/1
5 CAPSULE ORAL EVERY 4 HOURS PRN
COMMUNITY

## 2023-04-26 RX ORDER — NIFEDIPINE 60 MG/1
60 TABLET, EXTENDED RELEASE ORAL PRN
COMMUNITY

## 2023-04-26 RX ORDER — BISACODYL 10 MG
10 SUPPOSITORY, RECTAL RECTAL DAILY
COMMUNITY

## 2023-06-27 DIAGNOSIS — M48.02 CERVICAL SPINAL STENOSIS: Primary | ICD-10-CM

## 2023-06-30 ENCOUNTER — HOSPITAL ENCOUNTER (OUTPATIENT)
Dept: MRI IMAGING | Age: 68
End: 2023-06-30
Payer: MEDICARE

## 2023-06-30 DIAGNOSIS — M75.102 TEAR OF LEFT ROTATOR CUFF, UNSPECIFIED TEAR EXTENT, UNSPECIFIED WHETHER TRAUMATIC: ICD-10-CM

## 2023-06-30 PROCEDURE — 73221 MRI JOINT UPR EXTREM W/O DYE: CPT

## 2023-07-18 ENCOUNTER — OFFICE VISIT (OUTPATIENT)
Dept: NEUROSURGERY | Age: 68
End: 2023-07-18
Payer: OTHER GOVERNMENT

## 2023-07-18 ENCOUNTER — HOSPITAL ENCOUNTER (OUTPATIENT)
Dept: GENERAL RADIOLOGY | Age: 68
Discharge: HOME | End: 2023-07-20
Payer: OTHER GOVERNMENT

## 2023-07-18 ENCOUNTER — HOSPITAL ENCOUNTER (OUTPATIENT)
Age: 68
Discharge: HOME | End: 2023-07-18
Payer: OTHER GOVERNMENT

## 2023-07-18 VITALS — DIASTOLIC BLOOD PRESSURE: 87 MMHG | SYSTOLIC BLOOD PRESSURE: 132 MMHG | HEART RATE: 73 BPM | OXYGEN SATURATION: 94 %

## 2023-07-18 DIAGNOSIS — M48.02 CERVICAL SPINAL STENOSIS: ICD-10-CM

## 2023-07-18 DIAGNOSIS — Z98.1 S/P CERVICAL SPINAL FUSION: Primary | ICD-10-CM

## 2023-07-18 PROCEDURE — 99212 OFFICE O/P EST SF 10 MIN: CPT

## 2023-07-18 PROCEDURE — 72040 X-RAY EXAM NECK SPINE 2-3 VW: CPT

## 2023-07-18 NOTE — PROGRESS NOTES
Post Operative Follow-up    Patient is status post: cervical fusion. Mild op site pain. Arm weakness/numbness slowly improving. Physical Exam  Alert and Oriented X 3  PERRLA, EOMI  SOLIMAN. Bilateral arm weakness  Wound: C/D/I    A/P: patient is s/p C3-T1 PCF. X-rays stable. No restrictions.   F/u PRN

## 2024-11-14 NOTE — PROGRESS NOTES
Postop Progress Note    Subjective:  Patient denies any issues. No nausea or vomiting. States he is healing well. States he is back to normal bowel function. Objective:   Physical Exam  Incision: healing well, no drainage, no erythema, no hernia, no seroma, well approximated    Assessment:  3/12/23 Exploratory Laparotomy, Reduction of Internal Hernia       Plan:   1. Continue any current medications  2. Wound care discussed  3. Wound/Incision: healing well  4. Disposition: No restrictions  5. Follow up: prn  .     Physician Signature: Electronically signed by Teresita Lazcano MD no

## (undated) DEVICE — TUBING SUCT 12FR MAL ALUM SHFT FN CAP VENT UNIV CONN W/ OBT

## (undated) DEVICE — GENERAL LAPAROSCOPY: Brand: MEDLINE INDUSTRIES, INC.

## (undated) DEVICE — SWABSTCK, BENZOIN TINCTURE, 1/PK, STRL: Brand: APLICARE

## (undated) DEVICE — BINDER ABD SM M W12IN CIRC 30 45IN 4 PNL E CNTCT CLSR POSTOP

## (undated) DEVICE — COLLAR CERV REG AD 2 PC ATLS

## (undated) DEVICE — TROCAR: Brand: KII FIOS FIRST ENTRY

## (undated) DEVICE — GLOVE ORANGE PI 8   MSG9080

## (undated) DEVICE — NEEDLE HYPO 18GA L1.5IN PNK POLYPR HUB S STL REG BVL STR

## (undated) DEVICE — SWABSTICK MEDICATED L4IN BENZ TINC SKIN PREP APLICARE

## (undated) DEVICE — INSUFFLATION NEEDLE TO ESTABLISH PNEUMOPERITONEUM.: Brand: INSUFFLATION NEEDLE

## (undated) DEVICE — GAUZE,SPONGE,4"X4",16PLY,XRAY,STRL,LF: Brand: MEDLINE

## (undated) DEVICE — INTENDED FOR TISSUE SEPARATION, AND OTHER PROCEDURES THAT REQUIRE A SHARP SURGICAL BLADE TO PUNCTURE OR CUT.: Brand: BARD-PARKER ® STAINLESS STEEL BLADES

## (undated) DEVICE — NEEDLE SPNL L3.5IN PNK HUB S STL REG WALL FIT STYL W/ QNCKE

## (undated) DEVICE — LOPEZ VALVE®, NON-STERILE: Brand: ICU MEDICAL

## (undated) DEVICE — TOWEL,OR,DSP,ST,BLUE,STD,6/PK,12PK/CS: Brand: MEDLINE

## (undated) DEVICE — 3M™ IOBAN™ 2 ANTIMICROBIAL INCISE DRAPE 6650EZ: Brand: IOBAN™ 2

## (undated) DEVICE — KIT EVAC 400CC DIA1/8IN H PAT 12.5IN 3 SPR RND SHP PVC DRN

## (undated) DEVICE — BLADE CLP TAPR HD WET DRY CAPABILITY GTT IN CHARGING USE

## (undated) DEVICE — 3.0MM PRECISION NEURO (MATCH HEAD)

## (undated) DEVICE — 3M(TM) MEDIPORE(TM) +PAD SOFT CLOTH ADHESIVE WOUND DRESSING 3571: Brand: 3M™ MEDIPORE™

## (undated) DEVICE — DRAPE,REIN 53X77,STERILE: Brand: MEDLINE

## (undated) DEVICE — BLADE ES L6IN ELASTOMERIC COAT EXT DURABLE BEND UPTO 90DEG

## (undated) DEVICE — STERILE POLYISOPRENE POWDER-FREE SURGICAL GLOVES: Brand: PROTEXIS

## (undated) DEVICE — JACKSON TABLE POSITIONER KIT: Brand: MEDLINE INDUSTRIES, INC.

## (undated) DEVICE — BANDAGE,GAUZE,4.5"X4.1YD,STERILE,LF: Brand: MEDLINE

## (undated) DEVICE — WARMER SCP LAP

## (undated) DEVICE — LUMBAR LAMINECTOMY: Brand: MEDLINE INDUSTRIES, INC.

## (undated) DEVICE — SOLUTION IRRIG 1000ML STRL H2O USP PLAS POUR BTL

## (undated) DEVICE — BLADE CLIPPER GEN PURP NS

## (undated) DEVICE — STANDARD HYPODERMIC NEEDLE,ALUMINUM HUB: Brand: MONOJECT

## (undated) DEVICE — Device

## (undated) DEVICE — SYRINGE 20ML LL S/C 50

## (undated) DEVICE — SHEET, T, LAPAROTOMY, STERILE: Brand: MEDLINE

## (undated) DEVICE — 3M™ IOBAN™ 2 ANTIMICROBIAL INCISE DRAPE 6640EZ: Brand: IOBAN™ 2

## (undated) DEVICE — TROCAR: Brand: KII® SLEEVE

## (undated) DEVICE — THE MILL DISPOSABLE - FINE

## (undated) DEVICE — ADHESIVE SKIN CLOSURE TOP 36 CC HI VISC DERMBND MINI

## (undated) DEVICE — APPLICATOR PREP 26ML 0.7% IOD POVACRYLEX 74% ISO ALC ST

## (undated) DEVICE — GOWN,SIRUS,FABRNF,XL,20/CS: Brand: MEDLINE

## (undated) DEVICE — SPONGE,DISSECTOR,K,XRAY,9/16"X1/4",STRL: Brand: MEDLINE

## (undated) DEVICE — TUBE,GASTROSTOMY,22FR,3-PORT,WHITE,1/CS: Brand: MEDLINE

## (undated) DEVICE — CASPAR DISTR PIN12MMSTER: Brand: AESCULAP

## (undated) DEVICE — TOWEL,OR,DSP,ST,BLUE,DLX,10/PK,8PK/CS: Brand: MEDLINE

## (undated) DEVICE — GLOVE ORANGE PI 7 1/2   MSG9075

## (undated) DEVICE — TOTAL TRAY, DB, 100% SILI FOLEY, 16FR 10: Brand: MEDLINE

## (undated) DEVICE — 3M(TM) MEDIPORE(TM) +PAD SOFT CLOTH ADHESIVE WOUND DRESSING 3569: Brand: 3M™ MEDIPORE™

## (undated) DEVICE — GLOVE SURG SZ 8 L12IN FNGR THK79MIL GRN LTX FREE

## (undated) DEVICE — DRILL BIT, 12MM

## (undated) DEVICE — LAPAROSCOPIC SCISSORS: Brand: EPIX LAPAROSCOPIC SCISSORS

## (undated) DEVICE — READY WET SKIN SCRUB TRAY-LF: Brand: MEDLINE INDUSTRIES, INC.

## (undated) DEVICE — TOTAL TRAY, 16FR 10ML SIL FOLEY, URN: Brand: MEDLINE

## (undated) DEVICE — E-Z CLEAN, NON-STICK, PTFE COATED, ELECTROSURGICAL BLADE ELECTRODE, MODIFIED EXTENDED INSULATION, 2.5 INCH (6.35 CM): Brand: MEGADYNE

## (undated) DEVICE — HYPODERMIC SAFETY NEEDLE: Brand: MAGELLAN

## (undated) DEVICE — SPONGE,DISSECTOR,ROUND CHERRY,XR,ST,5/PK: Brand: MEDLINE

## (undated) DEVICE — SYRINGE IRRIG 60ML SFT PLIABLE BLB EZ TO GRP 1 HND USE W/

## (undated) DEVICE — SOLUTION IRRIG 1000ML 0.9% SOD CHL USP POUR PLAS BTL

## (undated) DEVICE — PAD GEN USE BORDERED ADH 14IN 2IN AND 12IN 4IN GZ UNIV ST

## (undated) DEVICE — TUBING, SUCTION, 3/16" X 12', STRAIGHT: Brand: MEDLINE

## (undated) DEVICE — GOWN,PREVENTION PLUS,XLN/2XL,ST,22/CS: Brand: MEDLINE

## (undated) DEVICE — GLOVE SURG 8.5 PF POLYMER WHT STRL SIGN LTX ESSENTIAL LTX

## (undated) DEVICE — APPLICATOR MEDICATED 26 CC SOLUTION HI LT ORNG CHLORAPREP

## (undated) DEVICE — SPONGE,PEANUT,XRAY,ST,SM,3/8",5/CARD: Brand: MEDLINE INDUSTRIES, INC.